# Patient Record
Sex: FEMALE | Race: WHITE | HISPANIC OR LATINO | Employment: UNEMPLOYED | URBAN - METROPOLITAN AREA
[De-identification: names, ages, dates, MRNs, and addresses within clinical notes are randomized per-mention and may not be internally consistent; named-entity substitution may affect disease eponyms.]

---

## 2017-02-01 ENCOUNTER — GENERIC CONVERSION - ENCOUNTER (OUTPATIENT)
Dept: OTHER | Facility: OTHER | Age: 57
End: 2017-02-01

## 2017-02-15 ENCOUNTER — GENERIC CONVERSION - ENCOUNTER (OUTPATIENT)
Dept: OTHER | Facility: OTHER | Age: 57
End: 2017-02-15

## 2017-05-03 ENCOUNTER — APPOINTMENT (OUTPATIENT)
Dept: LAB | Facility: CLINIC | Age: 57
End: 2017-05-03
Payer: COMMERCIAL

## 2017-05-03 ENCOUNTER — ALLSCRIPTS OFFICE VISIT (OUTPATIENT)
Dept: OTHER | Facility: OTHER | Age: 57
End: 2017-05-03

## 2017-05-03 ENCOUNTER — TRANSCRIBE ORDERS (OUTPATIENT)
Dept: ADMINISTRATIVE | Facility: HOSPITAL | Age: 57
End: 2017-05-03

## 2017-05-03 DIAGNOSIS — H53.9 UNSPECIFIED VISUAL DISTURBANCE: Primary | ICD-10-CM

## 2017-05-03 DIAGNOSIS — H53.9 VISUAL DISTURBANCE: ICD-10-CM

## 2017-05-03 DIAGNOSIS — R51 HEADACHE(784.0): ICD-10-CM

## 2017-05-03 DIAGNOSIS — R51.9 FACIAL PAIN: Primary | ICD-10-CM

## 2017-05-03 LAB
ALBUMIN SERPL BCP-MCNC: 4 G/DL (ref 3.5–5)
ALP SERPL-CCNC: 134 U/L (ref 46–116)
ALT SERPL W P-5'-P-CCNC: 24 U/L (ref 12–78)
ANION GAP SERPL CALCULATED.3IONS-SCNC: 8 MMOL/L (ref 4–13)
AST SERPL W P-5'-P-CCNC: 15 U/L (ref 5–45)
BASOPHILS # BLD AUTO: 0.12 THOUSANDS/ΜL (ref 0–0.1)
BASOPHILS NFR BLD AUTO: 1 % (ref 0–1)
BILIRUB SERPL-MCNC: 0.71 MG/DL (ref 0.2–1)
BUN SERPL-MCNC: 16 MG/DL (ref 5–25)
CALCIUM SERPL-MCNC: 10 MG/DL (ref 8.3–10.1)
CHLORIDE SERPL-SCNC: 107 MMOL/L (ref 100–108)
CO2 SERPL-SCNC: 27 MMOL/L (ref 21–32)
CREAT SERPL-MCNC: 0.82 MG/DL (ref 0.6–1.3)
CRP SERPL QL: 5.8 MG/L
EOSINOPHIL # BLD AUTO: 0.16 THOUSAND/ΜL (ref 0–0.61)
EOSINOPHIL NFR BLD AUTO: 1 % (ref 0–6)
ERYTHROCYTE [DISTWIDTH] IN BLOOD BY AUTOMATED COUNT: 15.9 % (ref 11.6–15.1)
ERYTHROCYTE [SEDIMENTATION RATE] IN BLOOD: 44 MM/HOUR (ref 0–20)
GFR SERPL CREATININE-BSD FRML MDRD: >60 ML/MIN/1.73SQ M
GLUCOSE SERPL-MCNC: 74 MG/DL (ref 65–140)
HCT VFR BLD AUTO: 41.8 % (ref 34.8–46.1)
HGB BLD-MCNC: 12.8 G/DL (ref 11.5–15.4)
LYMPHOCYTES # BLD AUTO: 5.23 THOUSANDS/ΜL (ref 0.6–4.47)
LYMPHOCYTES NFR BLD AUTO: 37 % (ref 14–44)
MCH RBC QN AUTO: 25 PG (ref 26.8–34.3)
MCHC RBC AUTO-ENTMCNC: 30.6 G/DL (ref 31.4–37.4)
MCV RBC AUTO: 82 FL (ref 82–98)
MONOCYTES # BLD AUTO: 0.96 THOUSAND/ΜL (ref 0.17–1.22)
MONOCYTES NFR BLD AUTO: 7 % (ref 4–12)
NEUTROPHILS # BLD AUTO: 7.54 THOUSANDS/ΜL (ref 1.85–7.62)
NEUTS SEG NFR BLD AUTO: 54 % (ref 43–75)
NRBC BLD AUTO-RTO: 0 /100 WBCS
PLATELET # BLD AUTO: 310 THOUSANDS/UL (ref 149–390)
PMV BLD AUTO: 13.7 FL (ref 8.9–12.7)
POTASSIUM SERPL-SCNC: 4 MMOL/L (ref 3.5–5.3)
PROT SERPL-MCNC: 8.5 G/DL (ref 6.4–8.2)
RBC # BLD AUTO: 5.13 MILLION/UL (ref 3.81–5.12)
SODIUM SERPL-SCNC: 142 MMOL/L (ref 136–145)
TSH SERPL DL<=0.05 MIU/L-ACNC: 2.34 UIU/ML (ref 0.36–3.74)
WBC # BLD AUTO: 14.05 THOUSAND/UL (ref 4.31–10.16)

## 2017-05-03 PROCEDURE — 86617 LYME DISEASE ANTIBODY: CPT

## 2017-05-03 PROCEDURE — 80053 COMPREHEN METABOLIC PANEL: CPT

## 2017-05-03 PROCEDURE — 84443 ASSAY THYROID STIM HORMONE: CPT

## 2017-05-03 PROCEDURE — 85652 RBC SED RATE AUTOMATED: CPT

## 2017-05-03 PROCEDURE — 85025 COMPLETE CBC W/AUTO DIFF WBC: CPT

## 2017-05-03 PROCEDURE — 86140 C-REACTIVE PROTEIN: CPT

## 2017-05-03 PROCEDURE — 86618 LYME DISEASE ANTIBODY: CPT

## 2017-05-04 ENCOUNTER — GENERIC CONVERSION - ENCOUNTER (OUTPATIENT)
Dept: OTHER | Facility: OTHER | Age: 57
End: 2017-05-04

## 2017-05-04 LAB
B BURGDOR IGG SER IA-ACNC: 0.2
B BURGDOR IGM SER IA-ACNC: 0.89

## 2017-05-05 LAB
B BURGDOR IGG PATRN SER IB-IMP: NEGATIVE
B BURGDOR IGM PATRN SER IB-IMP: NEGATIVE
B BURGDOR18KD IGG SER QL IB: ABNORMAL
B BURGDOR23KD IGG SER QL IB: ABNORMAL
B BURGDOR23KD IGM SER QL IB: PRESENT
B BURGDOR28KD IGG SER QL IB: ABNORMAL
B BURGDOR30KD IGG SER QL IB: ABNORMAL
B BURGDOR39KD IGG SER QL IB: ABNORMAL
B BURGDOR39KD IGM SER QL IB: ABNORMAL
B BURGDOR41KD IGG SER QL IB: ABNORMAL
B BURGDOR41KD IGM SER QL IB: ABNORMAL
B BURGDOR45KD IGG SER QL IB: ABNORMAL
B BURGDOR58KD IGG SER QL IB: ABNORMAL
B BURGDOR66KD IGG SER QL IB: ABNORMAL
B BURGDOR93KD IGG SER QL IB: ABNORMAL

## 2017-05-09 ENCOUNTER — GENERIC CONVERSION - ENCOUNTER (OUTPATIENT)
Dept: OTHER | Facility: OTHER | Age: 57
End: 2017-05-09

## 2017-05-16 ENCOUNTER — HOSPITAL ENCOUNTER (OUTPATIENT)
Dept: ULTRASOUND IMAGING | Facility: CLINIC | Age: 57
Discharge: HOME | End: 2017-05-16
Payer: COMMERCIAL

## 2017-05-16 ENCOUNTER — HOSPITAL ENCOUNTER (OUTPATIENT)
Dept: MRI IMAGING | Facility: CLINIC | Age: 57
Discharge: HOME | End: 2017-05-16
Payer: COMMERCIAL

## 2017-05-16 DIAGNOSIS — H53.9 VISUAL DISTURBANCE: ICD-10-CM

## 2017-05-16 PROCEDURE — 93880 EXTRACRANIAL BILAT STUDY: CPT

## 2017-05-16 PROCEDURE — 70551 MRI BRAIN STEM W/O DYE: CPT

## 2017-07-06 ENCOUNTER — HOSPITAL ENCOUNTER (OUTPATIENT)
Dept: NEUROLOGY | Facility: HOSPITAL | Age: 57
Discharge: HOME | End: 2017-07-06
Attending: PSYCHIATRY & NEUROLOGY
Payer: COMMERCIAL

## 2017-07-06 DIAGNOSIS — H53.9 VISUAL CHANGES: ICD-10-CM

## 2017-07-06 DIAGNOSIS — R51.9 FREQUENT HEADACHES: ICD-10-CM

## 2017-07-06 DIAGNOSIS — R51.9 FACIAL PAIN: ICD-10-CM

## 2017-07-06 PROCEDURE — 95816 EEG AWAKE AND DROWSY: CPT

## 2017-10-26 ENCOUNTER — ALLSCRIPTS OFFICE VISIT (OUTPATIENT)
Dept: OTHER | Facility: OTHER | Age: 57
End: 2017-10-26

## 2017-10-26 DIAGNOSIS — H53.9 VISUAL DISTURBANCE: ICD-10-CM
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
## 2018-01-12 VITALS
BODY MASS INDEX: 36.32 KG/M2 | HEART RATE: 78 BPM | HEIGHT: 63 IN | WEIGHT: 205 LBS | DIASTOLIC BLOOD PRESSURE: 86 MMHG | SYSTOLIC BLOOD PRESSURE: 122 MMHG | RESPIRATION RATE: 16 BRPM

## 2018-01-12 NOTE — MISCELLANEOUS
Dear Alex Pozo,      We have tried to contact you on several occasions  Please call Dr Naomi Delgado at [phone]       Thank you      Electronically signed by:Xi Engel   May  9 2017 10:32AM EST Co-author

## 2018-01-14 VITALS
WEIGHT: 210 LBS | HEIGHT: 63 IN | SYSTOLIC BLOOD PRESSURE: 130 MMHG | HEART RATE: 80 BPM | RESPIRATION RATE: 16 BRPM | BODY MASS INDEX: 37.21 KG/M2 | DIASTOLIC BLOOD PRESSURE: 90 MMHG

## 2021-10-08 ENCOUNTER — APPOINTMENT (EMERGENCY)
Dept: VASCULAR ULTRASOUND | Facility: HOSPITAL | Age: 61
End: 2021-10-08
Payer: COMMERCIAL

## 2021-10-08 ENCOUNTER — HOSPITAL ENCOUNTER (EMERGENCY)
Facility: HOSPITAL | Age: 61
Discharge: HOME | End: 2021-10-08
Attending: EMERGENCY MEDICINE | Admitting: EMERGENCY MEDICINE
Payer: COMMERCIAL

## 2021-10-08 ENCOUNTER — APPOINTMENT (EMERGENCY)
Dept: RADIOLOGY | Facility: HOSPITAL | Age: 61
End: 2021-10-08
Payer: COMMERCIAL

## 2021-10-08 VITALS
TEMPERATURE: 98.3 F | BODY MASS INDEX: 36.86 KG/M2 | DIASTOLIC BLOOD PRESSURE: 65 MMHG | OXYGEN SATURATION: 99 % | SYSTOLIC BLOOD PRESSURE: 127 MMHG | HEIGHT: 63 IN | WEIGHT: 208 LBS | RESPIRATION RATE: 16 BRPM | HEART RATE: 80 BPM

## 2021-10-08 DIAGNOSIS — M25.572 ACUTE LEFT ANKLE PAIN: Primary | ICD-10-CM

## 2021-10-08 DIAGNOSIS — M79.89 PAIN AND SWELLING OF LEFT LOWER LEG: ICD-10-CM

## 2021-10-08 DIAGNOSIS — M79.662 PAIN AND SWELLING OF LEFT LOWER LEG: ICD-10-CM

## 2021-10-08 PROCEDURE — 99284 EMERGENCY DEPT VISIT MOD MDM: CPT

## 2021-10-08 PROCEDURE — 99284 EMERGENCY DEPT VISIT MOD MDM: CPT | Performed by: EMERGENCY MEDICINE

## 2021-10-08 PROCEDURE — 93971 EXTREMITY STUDY: CPT

## 2021-10-08 PROCEDURE — 73610 X-RAY EXAM OF ANKLE: CPT

## 2021-10-08 RX ORDER — IBUPROFEN 600 MG/1
600 TABLET ORAL ONCE
Status: COMPLETED | OUTPATIENT
Start: 2021-10-08 | End: 2021-10-08

## 2021-10-08 RX ADMIN — IBUPROFEN 600 MG: 600 TABLET, FILM COATED ORAL at 11:55

## 2021-10-11 PROCEDURE — 93971 EXTREMITY STUDY: CPT | Performed by: SURGERY

## 2021-10-22 ENCOUNTER — APPOINTMENT (OUTPATIENT)
Dept: RADIOLOGY | Facility: CLINIC | Age: 61
End: 2021-10-22
Payer: COMMERCIAL

## 2021-10-22 ENCOUNTER — OFFICE VISIT (OUTPATIENT)
Dept: FAMILY MEDICINE CLINIC | Facility: CLINIC | Age: 61
End: 2021-10-22
Payer: COMMERCIAL

## 2021-10-22 VITALS
WEIGHT: 216 LBS | DIASTOLIC BLOOD PRESSURE: 88 MMHG | OXYGEN SATURATION: 100 % | BODY MASS INDEX: 38.27 KG/M2 | HEIGHT: 63 IN | SYSTOLIC BLOOD PRESSURE: 138 MMHG | TEMPERATURE: 97.5 F | HEART RATE: 100 BPM

## 2021-10-22 DIAGNOSIS — M25.572 CHRONIC PAIN OF LEFT ANKLE: ICD-10-CM

## 2021-10-22 DIAGNOSIS — R53.83 FATIGUE, UNSPECIFIED TYPE: ICD-10-CM

## 2021-10-22 DIAGNOSIS — G47.00 INSOMNIA, UNSPECIFIED TYPE: ICD-10-CM

## 2021-10-22 DIAGNOSIS — G89.29 CHRONIC PAIN OF LEFT ANKLE: ICD-10-CM

## 2021-10-22 DIAGNOSIS — G89.29 CHRONIC NONINTRACTABLE HEADACHE, UNSPECIFIED HEADACHE TYPE: ICD-10-CM

## 2021-10-22 DIAGNOSIS — M79.641 RIGHT HAND PAIN: ICD-10-CM

## 2021-10-22 DIAGNOSIS — R51.9 CHRONIC NONINTRACTABLE HEADACHE, UNSPECIFIED HEADACHE TYPE: ICD-10-CM

## 2021-10-22 DIAGNOSIS — H93.12 TINNITUS OF LEFT EAR: ICD-10-CM

## 2021-10-22 DIAGNOSIS — Z12.11 COLON CANCER SCREENING: Primary | ICD-10-CM

## 2021-10-22 DIAGNOSIS — Z13.1 SCREENING FOR DIABETES MELLITUS (DM): ICD-10-CM

## 2021-10-22 DIAGNOSIS — Z12.31 VISIT FOR SCREENING MAMMOGRAM: ICD-10-CM

## 2021-10-22 DIAGNOSIS — Z13.220 SCREENING FOR LIPID DISORDERS: ICD-10-CM

## 2021-10-22 DIAGNOSIS — Z12.4 CERVICAL CANCER SCREENING: ICD-10-CM

## 2021-10-22 DIAGNOSIS — Z23 NEEDS FLU SHOT: ICD-10-CM

## 2021-10-22 DIAGNOSIS — Z13.29 SCREENING FOR THYROID DISORDER: ICD-10-CM

## 2021-10-22 PROCEDURE — 90471 IMMUNIZATION ADMIN: CPT | Performed by: FAMILY MEDICINE

## 2021-10-22 PROCEDURE — 90682 RIV4 VACC RECOMBINANT DNA IM: CPT | Performed by: FAMILY MEDICINE

## 2021-10-22 PROCEDURE — 73130 X-RAY EXAM OF HAND: CPT

## 2021-10-22 PROCEDURE — 3008F BODY MASS INDEX DOCD: CPT | Performed by: PHYSICIAN ASSISTANT

## 2021-10-22 PROCEDURE — 3725F SCREEN DEPRESSION PERFORMED: CPT | Performed by: PHYSICIAN ASSISTANT

## 2021-10-22 PROCEDURE — 99203 OFFICE O/P NEW LOW 30 MIN: CPT | Performed by: PHYSICIAN ASSISTANT

## 2021-10-22 RX ORDER — MULTIVIT WITH MINERALS/LUTEIN
1 TABLET ORAL DAILY
COMMUNITY

## 2021-10-22 RX ORDER — SUMATRIPTAN 25 MG/1
25 TABLET, FILM COATED ORAL ONCE AS NEEDED
Qty: 30 TABLET | Refills: 0 | Status: SHIPPED | OUTPATIENT
Start: 2021-10-22 | End: 2022-01-12 | Stop reason: ALTCHOICE

## 2021-10-22 RX ORDER — TRAZODONE HYDROCHLORIDE 50 MG/1
50 TABLET ORAL
Qty: 30 TABLET | Refills: 0 | Status: SHIPPED | OUTPATIENT
Start: 2021-10-22 | End: 2021-11-05

## 2021-10-23 ENCOUNTER — APPOINTMENT (OUTPATIENT)
Dept: LAB | Facility: CLINIC | Age: 61
End: 2021-10-23
Payer: COMMERCIAL

## 2021-10-23 DIAGNOSIS — Z13.29 SCREENING FOR THYROID DISORDER: ICD-10-CM

## 2021-10-23 DIAGNOSIS — R53.83 FATIGUE, UNSPECIFIED TYPE: ICD-10-CM

## 2021-10-23 DIAGNOSIS — Z13.220 SCREENING FOR LIPID DISORDERS: ICD-10-CM

## 2021-10-23 DIAGNOSIS — Z13.1 SCREENING FOR DIABETES MELLITUS (DM): ICD-10-CM

## 2021-10-23 LAB
25(OH)D3 SERPL-MCNC: 22.9 NG/ML (ref 30–100)
ALBUMIN SERPL BCP-MCNC: 3.6 G/DL (ref 3.5–5)
ALP SERPL-CCNC: 117 U/L (ref 46–116)
ALT SERPL W P-5'-P-CCNC: 15 U/L (ref 12–78)
ANION GAP SERPL CALCULATED.3IONS-SCNC: 3 MMOL/L (ref 4–13)
AST SERPL W P-5'-P-CCNC: 13 U/L (ref 5–45)
BASOPHILS # BLD AUTO: 0.11 THOUSANDS/ΜL (ref 0–0.1)
BASOPHILS NFR BLD AUTO: 1 % (ref 0–1)
BILIRUB SERPL-MCNC: 1.12 MG/DL (ref 0.2–1)
BUN SERPL-MCNC: 12 MG/DL (ref 5–25)
CALCIUM SERPL-MCNC: 9.7 MG/DL (ref 8.3–10.1)
CHLORIDE SERPL-SCNC: 109 MMOL/L (ref 100–108)
CHOLEST SERPL-MCNC: 170 MG/DL (ref 50–200)
CO2 SERPL-SCNC: 28 MMOL/L (ref 21–32)
CREAT SERPL-MCNC: 0.87 MG/DL (ref 0.6–1.3)
EOSINOPHIL # BLD AUTO: 0.15 THOUSAND/ΜL (ref 0–0.61)
EOSINOPHIL NFR BLD AUTO: 1 % (ref 0–6)
ERYTHROCYTE [DISTWIDTH] IN BLOOD BY AUTOMATED COUNT: 17.6 % (ref 11.6–15.1)
EST. AVERAGE GLUCOSE BLD GHB EST-MCNC: 128 MG/DL
GFR SERPL CREATININE-BSD FRML MDRD: 73 ML/MIN/1.73SQ M
GLUCOSE P FAST SERPL-MCNC: 92 MG/DL (ref 65–99)
HBA1C MFR BLD: 6.1 %
HCT VFR BLD AUTO: 30.9 % (ref 34.8–46.1)
HDLC SERPL-MCNC: 36 MG/DL
HGB BLD-MCNC: 8.5 G/DL (ref 11.5–15.4)
IMM GRANULOCYTES # BLD AUTO: 0.06 THOUSAND/UL (ref 0–0.2)
IMM GRANULOCYTES NFR BLD AUTO: 0 % (ref 0–2)
LDLC SERPL CALC-MCNC: 99 MG/DL (ref 0–100)
LYMPHOCYTES # BLD AUTO: 5.76 THOUSANDS/ΜL (ref 0.6–4.47)
LYMPHOCYTES NFR BLD AUTO: 42 % (ref 14–44)
MCH RBC QN AUTO: 19.2 PG (ref 26.8–34.3)
MCHC RBC AUTO-ENTMCNC: 27.5 G/DL (ref 31.4–37.4)
MCV RBC AUTO: 70 FL (ref 82–98)
MONOCYTES # BLD AUTO: 0.7 THOUSAND/ΜL (ref 0.17–1.22)
MONOCYTES NFR BLD AUTO: 5 % (ref 4–12)
NEUTROPHILS # BLD AUTO: 6.98 THOUSANDS/ΜL (ref 1.85–7.62)
NEUTS SEG NFR BLD AUTO: 51 % (ref 43–75)
NRBC BLD AUTO-RTO: 0 /100 WBCS
PLATELET # BLD AUTO: 257 THOUSANDS/UL (ref 149–390)
POTASSIUM SERPL-SCNC: 4 MMOL/L (ref 3.5–5.3)
PROT SERPL-MCNC: 7.7 G/DL (ref 6.4–8.2)
RBC # BLD AUTO: 4.42 MILLION/UL (ref 3.81–5.12)
SODIUM SERPL-SCNC: 140 MMOL/L (ref 136–145)
TRIGL SERPL-MCNC: 176 MG/DL
TSH SERPL DL<=0.05 MIU/L-ACNC: 2.76 UIU/ML (ref 0.36–3.74)
WBC # BLD AUTO: 13.76 THOUSAND/UL (ref 4.31–10.16)

## 2021-10-23 PROCEDURE — 83036 HEMOGLOBIN GLYCOSYLATED A1C: CPT

## 2021-10-23 PROCEDURE — 85025 COMPLETE CBC W/AUTO DIFF WBC: CPT

## 2021-10-23 PROCEDURE — 84443 ASSAY THYROID STIM HORMONE: CPT

## 2021-10-23 PROCEDURE — 80053 COMPREHEN METABOLIC PANEL: CPT

## 2021-10-23 PROCEDURE — 80061 LIPID PANEL: CPT

## 2021-10-23 PROCEDURE — 36415 COLL VENOUS BLD VENIPUNCTURE: CPT

## 2021-10-23 PROCEDURE — 82306 VITAMIN D 25 HYDROXY: CPT

## 2021-11-01 ENCOUNTER — APPOINTMENT (OUTPATIENT)
Dept: RADIOLOGY | Facility: CLINIC | Age: 61
End: 2021-11-01
Payer: COMMERCIAL

## 2021-11-01 ENCOUNTER — CONSULT (OUTPATIENT)
Dept: OBGYN CLINIC | Facility: CLINIC | Age: 61
End: 2021-11-01
Payer: COMMERCIAL

## 2021-11-01 VITALS
WEIGHT: 214 LBS | DIASTOLIC BLOOD PRESSURE: 82 MMHG | BODY MASS INDEX: 37.92 KG/M2 | HEIGHT: 63 IN | HEART RATE: 80 BPM | RESPIRATION RATE: 18 BRPM | SYSTOLIC BLOOD PRESSURE: 131 MMHG

## 2021-11-01 DIAGNOSIS — M25.572 CHRONIC PAIN OF LEFT ANKLE: ICD-10-CM

## 2021-11-01 DIAGNOSIS — G89.29 CHRONIC PAIN OF LEFT ANKLE: ICD-10-CM

## 2021-11-01 DIAGNOSIS — R60.0 LEG EDEMA, LEFT: Primary | ICD-10-CM

## 2021-11-01 DIAGNOSIS — M54.42 ACUTE LEFT-SIDED LOW BACK PAIN WITH LEFT-SIDED SCIATICA: ICD-10-CM

## 2021-11-01 DIAGNOSIS — M54.16 LUMBAR RADICULOPATHY: ICD-10-CM

## 2021-11-01 PROCEDURE — 99203 OFFICE O/P NEW LOW 30 MIN: CPT | Performed by: ORTHOPAEDIC SURGERY

## 2021-11-01 PROCEDURE — 72100 X-RAY EXAM L-S SPINE 2/3 VWS: CPT

## 2021-11-05 ENCOUNTER — OFFICE VISIT (OUTPATIENT)
Dept: FAMILY MEDICINE CLINIC | Facility: CLINIC | Age: 61
End: 2021-11-05
Payer: COMMERCIAL

## 2021-11-05 ENCOUNTER — LAB (OUTPATIENT)
Dept: LAB | Facility: CLINIC | Age: 61
End: 2021-11-05
Payer: COMMERCIAL

## 2021-11-05 VITALS
TEMPERATURE: 97.8 F | OXYGEN SATURATION: 96 % | SYSTOLIC BLOOD PRESSURE: 128 MMHG | HEIGHT: 63 IN | BODY MASS INDEX: 37.92 KG/M2 | WEIGHT: 214 LBS | DIASTOLIC BLOOD PRESSURE: 76 MMHG | HEART RATE: 84 BPM

## 2021-11-05 DIAGNOSIS — R73.03 PRE-DIABETES: ICD-10-CM

## 2021-11-05 DIAGNOSIS — Z23 ENCOUNTER FOR IMMUNIZATION: Primary | ICD-10-CM

## 2021-11-05 DIAGNOSIS — E78.1 HYPERTRIGLYCERIDEMIA: ICD-10-CM

## 2021-11-05 DIAGNOSIS — E55.9 VITAMIN D DEFICIENCY: ICD-10-CM

## 2021-11-05 DIAGNOSIS — D64.9 ANEMIA, UNSPECIFIED TYPE: ICD-10-CM

## 2021-11-05 DIAGNOSIS — M62.838 MUSCLE SPASM OF LEFT LOWER EXTREMITY: ICD-10-CM

## 2021-11-05 LAB
25(OH)D3 SERPL-MCNC: 22 NG/ML (ref 30–100)
BASOPHILS # BLD AUTO: 0.16 THOUSANDS/ΜL (ref 0–0.1)
BASOPHILS NFR BLD AUTO: 1 % (ref 0–1)
CHOLEST SERPL-MCNC: 158 MG/DL (ref 50–200)
EOSINOPHIL # BLD AUTO: 0.23 THOUSAND/ΜL (ref 0–0.61)
EOSINOPHIL NFR BLD AUTO: 2 % (ref 0–6)
ERYTHROCYTE [DISTWIDTH] IN BLOOD BY AUTOMATED COUNT: 17.9 % (ref 11.6–15.1)
EST. AVERAGE GLUCOSE BLD GHB EST-MCNC: 134 MG/DL
FERRITIN SERPL-MCNC: 4 NG/ML (ref 8–388)
HBA1C MFR BLD: 6.3 %
HCT VFR BLD AUTO: 31.4 % (ref 34.8–46.1)
HDLC SERPL-MCNC: 36 MG/DL
HGB BLD-MCNC: 8.8 G/DL (ref 11.5–15.4)
IMM GRANULOCYTES # BLD AUTO: 0.04 THOUSAND/UL (ref 0–0.2)
IMM GRANULOCYTES NFR BLD AUTO: 0 % (ref 0–2)
IRON SATN MFR SERPL: 5 % (ref 15–50)
IRON SERPL-MCNC: 26 UG/DL (ref 50–170)
LDLC SERPL CALC-MCNC: 89 MG/DL (ref 0–100)
LYMPHOCYTES # BLD AUTO: 5.77 THOUSANDS/ΜL (ref 0.6–4.47)
LYMPHOCYTES NFR BLD AUTO: 39 % (ref 14–44)
MCH RBC QN AUTO: 19.2 PG (ref 26.8–34.3)
MCHC RBC AUTO-ENTMCNC: 28 G/DL (ref 31.4–37.4)
MCV RBC AUTO: 69 FL (ref 82–98)
MONOCYTES # BLD AUTO: 0.91 THOUSAND/ΜL (ref 0.17–1.22)
MONOCYTES NFR BLD AUTO: 6 % (ref 4–12)
NEUTROPHILS # BLD AUTO: 7.55 THOUSANDS/ΜL (ref 1.85–7.62)
NEUTS SEG NFR BLD AUTO: 52 % (ref 43–75)
NONHDLC SERPL-MCNC: 122 MG/DL
NRBC BLD AUTO-RTO: 0 /100 WBCS
PLATELET # BLD AUTO: 291 THOUSANDS/UL (ref 149–390)
RBC # BLD AUTO: 4.58 MILLION/UL (ref 3.81–5.12)
TIBC SERPL-MCNC: 502 UG/DL (ref 250–450)
TRIGL SERPL-MCNC: 165 MG/DL
WBC # BLD AUTO: 14.66 THOUSAND/UL (ref 4.31–10.16)

## 2021-11-05 PROCEDURE — 82728 ASSAY OF FERRITIN: CPT

## 2021-11-05 PROCEDURE — 83540 ASSAY OF IRON: CPT

## 2021-11-05 PROCEDURE — 99214 OFFICE O/P EST MOD 30 MIN: CPT | Performed by: PHYSICIAN ASSISTANT

## 2021-11-05 PROCEDURE — 36415 COLL VENOUS BLD VENIPUNCTURE: CPT

## 2021-11-05 PROCEDURE — 83550 IRON BINDING TEST: CPT

## 2021-11-05 PROCEDURE — 83036 HEMOGLOBIN GLYCOSYLATED A1C: CPT

## 2021-11-05 PROCEDURE — 80061 LIPID PANEL: CPT

## 2021-11-05 PROCEDURE — 90732 PPSV23 VACC 2 YRS+ SUBQ/IM: CPT

## 2021-11-05 PROCEDURE — 82306 VITAMIN D 25 HYDROXY: CPT

## 2021-11-05 PROCEDURE — 85025 COMPLETE CBC W/AUTO DIFF WBC: CPT

## 2021-11-05 PROCEDURE — 90471 IMMUNIZATION ADMIN: CPT

## 2021-11-05 RX ORDER — ERGOCALCIFEROL 1.25 MG/1
50000 CAPSULE ORAL WEEKLY
Qty: 12 CAPSULE | Refills: 0 | Status: SHIPPED | OUTPATIENT
Start: 2021-11-05

## 2021-11-05 RX ORDER — CYCLOBENZAPRINE HCL 10 MG
10 TABLET ORAL
Qty: 30 TABLET | Refills: 0 | Status: SHIPPED | OUTPATIENT
Start: 2021-11-05 | End: 2022-01-12 | Stop reason: ALTCHOICE

## 2021-11-08 ENCOUNTER — TELEPHONE (OUTPATIENT)
Dept: HEMATOLOGY ONCOLOGY | Facility: CLINIC | Age: 61
End: 2021-11-08

## 2021-11-16 ENCOUNTER — EVALUATION (OUTPATIENT)
Dept: OCCUPATIONAL THERAPY | Facility: CLINIC | Age: 61
End: 2021-11-16
Payer: COMMERCIAL

## 2021-11-16 ENCOUNTER — EVALUATION (OUTPATIENT)
Dept: PHYSICAL THERAPY | Facility: CLINIC | Age: 61
End: 2021-11-16
Payer: COMMERCIAL

## 2021-11-16 DIAGNOSIS — M54.16 LUMBAR RADICULOPATHY: Primary | ICD-10-CM

## 2021-11-16 DIAGNOSIS — R60.0 LEG EDEMA, LEFT: Primary | ICD-10-CM

## 2021-11-16 DIAGNOSIS — G89.29 CHRONIC PAIN OF LEFT ANKLE: ICD-10-CM

## 2021-11-16 DIAGNOSIS — M25.572 CHRONIC PAIN OF LEFT ANKLE: ICD-10-CM

## 2021-11-16 PROCEDURE — 97165 OT EVAL LOW COMPLEX 30 MIN: CPT

## 2021-11-16 PROCEDURE — 97140 MANUAL THERAPY 1/> REGIONS: CPT

## 2021-11-16 PROCEDURE — 97163 PT EVAL HIGH COMPLEX 45 MIN: CPT | Performed by: PHYSICAL THERAPIST

## 2021-11-18 ENCOUNTER — OFFICE VISIT (OUTPATIENT)
Dept: PHYSICAL THERAPY | Facility: CLINIC | Age: 61
End: 2021-11-18
Payer: COMMERCIAL

## 2021-11-18 ENCOUNTER — OFFICE VISIT (OUTPATIENT)
Dept: OCCUPATIONAL THERAPY | Facility: CLINIC | Age: 61
End: 2021-11-18
Payer: COMMERCIAL

## 2021-11-18 DIAGNOSIS — R60.0 LEG EDEMA, LEFT: ICD-10-CM

## 2021-11-18 DIAGNOSIS — M25.572 CHRONIC PAIN OF LEFT ANKLE: Primary | ICD-10-CM

## 2021-11-18 DIAGNOSIS — M54.16 LUMBAR RADICULOPATHY: Primary | ICD-10-CM

## 2021-11-18 DIAGNOSIS — G89.29 CHRONIC PAIN OF LEFT ANKLE: Primary | ICD-10-CM

## 2021-11-18 PROCEDURE — 97016 VASOPNEUMATIC DEVICE THERAPY: CPT

## 2021-11-18 PROCEDURE — 97110 THERAPEUTIC EXERCISES: CPT | Performed by: PHYSICAL THERAPIST

## 2021-11-18 PROCEDURE — 97112 NEUROMUSCULAR REEDUCATION: CPT | Performed by: PHYSICAL THERAPIST

## 2021-11-18 PROCEDURE — 97140 MANUAL THERAPY 1/> REGIONS: CPT

## 2021-11-23 ENCOUNTER — OFFICE VISIT (OUTPATIENT)
Dept: OCCUPATIONAL THERAPY | Facility: CLINIC | Age: 61
End: 2021-11-23
Payer: COMMERCIAL

## 2021-11-23 ENCOUNTER — OFFICE VISIT (OUTPATIENT)
Dept: PHYSICAL THERAPY | Facility: CLINIC | Age: 61
End: 2021-11-23
Payer: COMMERCIAL

## 2021-11-23 DIAGNOSIS — R60.0 LEG EDEMA, LEFT: ICD-10-CM

## 2021-11-23 DIAGNOSIS — G89.29 CHRONIC PAIN OF LEFT ANKLE: Primary | ICD-10-CM

## 2021-11-23 DIAGNOSIS — M25.572 CHRONIC PAIN OF LEFT ANKLE: Primary | ICD-10-CM

## 2021-11-23 DIAGNOSIS — M54.16 LUMBAR RADICULOPATHY: Primary | ICD-10-CM

## 2021-11-23 PROCEDURE — 97110 THERAPEUTIC EXERCISES: CPT

## 2021-11-23 PROCEDURE — 97112 NEUROMUSCULAR REEDUCATION: CPT

## 2021-11-23 PROCEDURE — 97140 MANUAL THERAPY 1/> REGIONS: CPT

## 2021-11-23 PROCEDURE — 97016 VASOPNEUMATIC DEVICE THERAPY: CPT

## 2021-11-23 PROCEDURE — 97530 THERAPEUTIC ACTIVITIES: CPT

## 2021-11-26 ENCOUNTER — APPOINTMENT (OUTPATIENT)
Dept: PHYSICAL THERAPY | Facility: CLINIC | Age: 61
End: 2021-11-26
Payer: COMMERCIAL

## 2021-11-29 ENCOUNTER — OFFICE VISIT (OUTPATIENT)
Dept: OCCUPATIONAL THERAPY | Facility: CLINIC | Age: 61
End: 2021-11-29
Payer: COMMERCIAL

## 2021-11-29 ENCOUNTER — OFFICE VISIT (OUTPATIENT)
Dept: PHYSICAL THERAPY | Facility: CLINIC | Age: 61
End: 2021-11-29
Payer: COMMERCIAL

## 2021-11-29 DIAGNOSIS — R60.0 LEG EDEMA, LEFT: Primary | ICD-10-CM

## 2021-11-29 DIAGNOSIS — M25.572 CHRONIC PAIN OF LEFT ANKLE: ICD-10-CM

## 2021-11-29 DIAGNOSIS — M54.16 LUMBAR RADICULOPATHY: Primary | ICD-10-CM

## 2021-11-29 DIAGNOSIS — G89.29 CHRONIC PAIN OF LEFT ANKLE: ICD-10-CM

## 2021-11-29 PROCEDURE — 97112 NEUROMUSCULAR REEDUCATION: CPT

## 2021-11-29 PROCEDURE — 97140 MANUAL THERAPY 1/> REGIONS: CPT

## 2021-11-29 PROCEDURE — 97530 THERAPEUTIC ACTIVITIES: CPT

## 2021-11-29 PROCEDURE — 97110 THERAPEUTIC EXERCISES: CPT

## 2021-11-29 PROCEDURE — 97016 VASOPNEUMATIC DEVICE THERAPY: CPT

## 2021-11-30 ENCOUNTER — CONSULT (OUTPATIENT)
Dept: HEMATOLOGY ONCOLOGY | Facility: CLINIC | Age: 61
End: 2021-11-30
Payer: COMMERCIAL

## 2021-11-30 ENCOUNTER — APPOINTMENT (OUTPATIENT)
Dept: LAB | Facility: HOSPITAL | Age: 61
End: 2021-11-30
Payer: COMMERCIAL

## 2021-11-30 VITALS
WEIGHT: 209 LBS | BODY MASS INDEX: 37.03 KG/M2 | HEIGHT: 63 IN | RESPIRATION RATE: 16 BRPM | TEMPERATURE: 98.5 F | HEART RATE: 97 BPM | DIASTOLIC BLOOD PRESSURE: 90 MMHG | SYSTOLIC BLOOD PRESSURE: 146 MMHG

## 2021-11-30 DIAGNOSIS — D50.9 IRON DEFICIENCY ANEMIA, UNSPECIFIED IRON DEFICIENCY ANEMIA TYPE: Primary | ICD-10-CM

## 2021-11-30 DIAGNOSIS — D50.9 IRON DEFICIENCY ANEMIA, UNSPECIFIED IRON DEFICIENCY ANEMIA TYPE: ICD-10-CM

## 2021-11-30 DIAGNOSIS — D72.829 LEUKOCYTOSIS, UNSPECIFIED TYPE: ICD-10-CM

## 2021-11-30 DIAGNOSIS — D64.9 ANEMIA, UNSPECIFIED TYPE: ICD-10-CM

## 2021-11-30 LAB
ERYTHROCYTE [DISTWIDTH] IN BLOOD BY AUTOMATED COUNT: 18.5 % (ref 11.6–15.1)
FERRITIN SERPL-MCNC: 9 NG/ML (ref 8–388)
FOLATE SERPL-MCNC: 15.7 NG/ML (ref 3.1–17.5)
HCT VFR BLD AUTO: 32.4 % (ref 34.8–46.1)
HGB BLD-MCNC: 8.9 G/DL (ref 11.5–15.4)
IRON SATN MFR SERPL: 5 % (ref 15–50)
IRON SERPL-MCNC: 20 UG/DL (ref 50–170)
MCH RBC QN AUTO: 18.8 PG (ref 26.8–34.3)
MCHC RBC AUTO-ENTMCNC: 27.5 G/DL (ref 31.4–37.4)
MCV RBC AUTO: 69 FL (ref 82–98)
PLATELET # BLD AUTO: 259 THOUSANDS/UL (ref 149–390)
RBC # BLD AUTO: 4.73 MILLION/UL (ref 3.81–5.12)
TIBC SERPL-MCNC: 442 UG/DL (ref 250–450)
VIT B12 SERPL-MCNC: 1139 PG/ML (ref 100–900)
WBC # BLD AUTO: 10.57 THOUSAND/UL (ref 4.31–10.16)

## 2021-11-30 PROCEDURE — 88184 FLOWCYTOMETRY/ TC 1 MARKER: CPT

## 2021-11-30 PROCEDURE — 82746 ASSAY OF FOLIC ACID SERUM: CPT

## 2021-11-30 PROCEDURE — 36415 COLL VENOUS BLD VENIPUNCTURE: CPT

## 2021-11-30 PROCEDURE — 85027 COMPLETE CBC AUTOMATED: CPT

## 2021-11-30 PROCEDURE — 83540 ASSAY OF IRON: CPT

## 2021-11-30 PROCEDURE — 83550 IRON BINDING TEST: CPT

## 2021-11-30 PROCEDURE — 82728 ASSAY OF FERRITIN: CPT

## 2021-11-30 PROCEDURE — 3008F BODY MASS INDEX DOCD: CPT | Performed by: PHYSICIAN ASSISTANT

## 2021-11-30 PROCEDURE — 88185 FLOWCYTOMETRY/TC ADD-ON: CPT

## 2021-11-30 PROCEDURE — 82607 VITAMIN B-12: CPT

## 2021-11-30 PROCEDURE — 99244 OFF/OP CNSLTJ NEW/EST MOD 40: CPT | Performed by: STUDENT IN AN ORGANIZED HEALTH CARE EDUCATION/TRAINING PROGRAM

## 2021-11-30 RX ORDER — SODIUM CHLORIDE 9 MG/ML
20 INJECTION, SOLUTION INTRAVENOUS ONCE
Status: CANCELLED | OUTPATIENT
Start: 2021-12-01

## 2021-12-01 DIAGNOSIS — D50.9 IRON DEFICIENCY ANEMIA, UNSPECIFIED IRON DEFICIENCY ANEMIA TYPE: Primary | ICD-10-CM

## 2021-12-01 LAB — SCAN RESULT: NORMAL

## 2021-12-01 RX ORDER — SODIUM CHLORIDE 9 MG/ML
20 INJECTION, SOLUTION INTRAVENOUS ONCE
Status: CANCELLED | OUTPATIENT
Start: 2021-12-03

## 2021-12-02 ENCOUNTER — OFFICE VISIT (OUTPATIENT)
Dept: PHYSICAL THERAPY | Facility: CLINIC | Age: 61
End: 2021-12-02
Payer: COMMERCIAL

## 2021-12-02 ENCOUNTER — OFFICE VISIT (OUTPATIENT)
Dept: OCCUPATIONAL THERAPY | Facility: CLINIC | Age: 61
End: 2021-12-02
Payer: COMMERCIAL

## 2021-12-02 DIAGNOSIS — R60.0 LEG EDEMA, LEFT: Primary | ICD-10-CM

## 2021-12-02 DIAGNOSIS — G89.29 CHRONIC PAIN OF LEFT ANKLE: ICD-10-CM

## 2021-12-02 DIAGNOSIS — M25.572 CHRONIC PAIN OF LEFT ANKLE: ICD-10-CM

## 2021-12-02 DIAGNOSIS — M54.16 LUMBAR RADICULOPATHY: Primary | ICD-10-CM

## 2021-12-02 PROCEDURE — 97112 NEUROMUSCULAR REEDUCATION: CPT

## 2021-12-02 PROCEDURE — 97530 THERAPEUTIC ACTIVITIES: CPT

## 2021-12-02 PROCEDURE — 97140 MANUAL THERAPY 1/> REGIONS: CPT

## 2021-12-02 PROCEDURE — 97016 VASOPNEUMATIC DEVICE THERAPY: CPT

## 2021-12-02 PROCEDURE — 97110 THERAPEUTIC EXERCISES: CPT

## 2021-12-03 ENCOUNTER — HOSPITAL ENCOUNTER (OUTPATIENT)
Dept: INFUSION CENTER | Facility: CLINIC | Age: 61
Discharge: HOME | End: 2021-12-03
Payer: COMMERCIAL

## 2021-12-03 VITALS
DIASTOLIC BLOOD PRESSURE: 85 MMHG | RESPIRATION RATE: 18 BRPM | HEART RATE: 105 BPM | TEMPERATURE: 97.3 F | SYSTOLIC BLOOD PRESSURE: 138 MMHG | OXYGEN SATURATION: 97 %

## 2021-12-03 DIAGNOSIS — D50.9 IRON DEFICIENCY ANEMIA, UNSPECIFIED IRON DEFICIENCY ANEMIA TYPE: Primary | ICD-10-CM

## 2021-12-03 PROCEDURE — 96365 THER/PROPH/DIAG IV INF INIT: CPT

## 2021-12-03 RX ORDER — SODIUM CHLORIDE 9 MG/ML
20 INJECTION, SOLUTION INTRAVENOUS ONCE
Status: CANCELLED | OUTPATIENT
Start: 2021-12-10

## 2021-12-03 RX ORDER — SODIUM CHLORIDE 9 MG/ML
20 INJECTION, SOLUTION INTRAVENOUS ONCE
Status: COMPLETED | OUTPATIENT
Start: 2021-12-03 | End: 2021-12-03

## 2021-12-03 RX ADMIN — SODIUM CHLORIDE 20 ML/HR: 0.9 INJECTION, SOLUTION INTRAVENOUS at 13:02

## 2021-12-03 RX ADMIN — IRON SUCROSE 300 MG: 20 INJECTION, SOLUTION INTRAVENOUS at 13:02

## 2021-12-07 ENCOUNTER — OFFICE VISIT (OUTPATIENT)
Dept: PHYSICAL THERAPY | Facility: CLINIC | Age: 61
End: 2021-12-07
Payer: COMMERCIAL

## 2021-12-07 ENCOUNTER — OFFICE VISIT (OUTPATIENT)
Dept: OCCUPATIONAL THERAPY | Facility: CLINIC | Age: 61
End: 2021-12-07
Payer: COMMERCIAL

## 2021-12-07 DIAGNOSIS — G89.29 CHRONIC PAIN OF LEFT ANKLE: Primary | ICD-10-CM

## 2021-12-07 DIAGNOSIS — M54.16 LUMBAR RADICULOPATHY: Primary | ICD-10-CM

## 2021-12-07 DIAGNOSIS — M25.572 CHRONIC PAIN OF LEFT ANKLE: Primary | ICD-10-CM

## 2021-12-07 PROCEDURE — 97110 THERAPEUTIC EXERCISES: CPT

## 2021-12-07 PROCEDURE — 97140 MANUAL THERAPY 1/> REGIONS: CPT

## 2021-12-07 PROCEDURE — 97112 NEUROMUSCULAR REEDUCATION: CPT

## 2021-12-07 PROCEDURE — 97016 VASOPNEUMATIC DEVICE THERAPY: CPT

## 2021-12-09 ENCOUNTER — APPOINTMENT (OUTPATIENT)
Dept: OCCUPATIONAL THERAPY | Facility: CLINIC | Age: 61
End: 2021-12-09
Payer: COMMERCIAL

## 2021-12-09 ENCOUNTER — APPOINTMENT (OUTPATIENT)
Dept: PHYSICAL THERAPY | Facility: CLINIC | Age: 61
End: 2021-12-09
Payer: COMMERCIAL

## 2021-12-09 NOTE — PROGRESS NOTES
Daily Note     Today's date: 2021  Patient name: Lawson Polk  : 1960  MRN: [id number]  Referring provider: Kiera Chase DO  Dx:   Encounter Diagnosis     ICD-10-CM    1  Lumbar radiculopathy  M54 16                   Subjective: ***      Objective: See treatment diary below      Assessment: Tolerated treatment well  Patient demonstrated fatigue post treatment and would benefit from continued PT  Plan: Continue per plan of care  Progress treatment as tolerated  Precautions:  GERD; low back pain      Precautions:  GERD; low back pain      Precautions: chronic pain      Manuals                                                           Neuro Re-Ed             TA iso  10x10" 5" 2x 10  5" 2x 10  5" 2x 10         BFKO  20x 20x ea  20x limited range  20x 2x 10        Dead bug              Seated multifidus rotation  GTB 10x5" ea  NV  GTB 2x10,5" ea  TA marching  10 x 10 x ea 2x 10        Multifidus antirotation standing       GTB  5x ea  Ther Ex             Nustep: UE/LE  L5 10 min L5 10 min  L3 10 min  L6 10 min L6 10 min        Webslide: L row  GTB 2x10 3" GTB and M  L and M GTB 2x 10  L and M GTB 2x 10  L and M GTB 2x 10        Clamshells   NV  2x10 2x 10        Bridging   NV 2x 10  2x10 2x 10        Piriformis str     30" x 3  30" x 3  30" x 3 30" x 3                                               Ther Activity             Total gym: squats  L22 2x10 L22 2x 10  L22 3x 10  Squats at bar 2x15   NV,L22 2x10                    Gait Training                                       Modalities

## 2021-12-10 ENCOUNTER — HOSPITAL ENCOUNTER (OUTPATIENT)
Dept: INFUSION CENTER | Facility: CLINIC | Age: 61
Discharge: HOME | End: 2021-12-10
Payer: COMMERCIAL

## 2021-12-10 VITALS
RESPIRATION RATE: 18 BRPM | HEART RATE: 84 BPM | TEMPERATURE: 96.7 F | SYSTOLIC BLOOD PRESSURE: 145 MMHG | DIASTOLIC BLOOD PRESSURE: 72 MMHG

## 2021-12-10 DIAGNOSIS — D50.9 IRON DEFICIENCY ANEMIA, UNSPECIFIED IRON DEFICIENCY ANEMIA TYPE: Primary | ICD-10-CM

## 2021-12-10 PROCEDURE — 96365 THER/PROPH/DIAG IV INF INIT: CPT

## 2021-12-10 RX ORDER — SODIUM CHLORIDE 9 MG/ML
20 INJECTION, SOLUTION INTRAVENOUS ONCE
Status: COMPLETED | OUTPATIENT
Start: 2021-12-10 | End: 2021-12-10

## 2021-12-10 RX ORDER — SODIUM CHLORIDE 9 MG/ML
20 INJECTION, SOLUTION INTRAVENOUS ONCE
Status: CANCELLED | OUTPATIENT
Start: 2021-12-17

## 2021-12-10 RX ADMIN — IRON SUCROSE 300 MG: 20 INJECTION, SOLUTION INTRAVENOUS at 12:50

## 2021-12-10 RX ADMIN — SODIUM CHLORIDE 20 ML/HR: 9 INJECTION, SOLUTION INTRAVENOUS at 12:44

## 2021-12-10 NOTE — H&P (VIEW-ONLY)
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[place] [address]      Initial Visit  Nita Caceres is a 63 y o  here for new consultation with me today  The patient is referred by Juanpablo Bean and the reason for consultation is anemia  Her CBC is remarkable for leukocytosis and microcytic anemia with stable platelet count:   0/1/2983  10/23/2021  11/5/2021  11/30/2021    WBC 14 05 (H) 13 76 (H) 14 66 (H) 10 57 (H)   Hemoglobin 12 8 8 5 (L) 8 8 (L) 8 9 (L)   HCT 41 8 30 9 (L) 31 4 (L) 32 4 (L)   MCV 82 70 (L) 69 (L) 69 (L)   Platelet Count 515 451 291 259     Her differential is remarkable for increased lymphocytes and basophils:   5/3/2017  10/23/2021  11/5/2021    Immature Grans Absolute  0 06 0 04   Absolute Neutrophils 7 54 6 98 7 55   Lymphocytes Absolute 5 23 (H) 5 76 (H) 5 77 (H)   Absolute Monocytes 0 96 0 70 0 91   Absolute Eosinophils 0 16 0 15 0 23   Basophils Absolute 0 12 (H) 0 11 (H) 0 16 (H)     Her iron panel shows significant iron deficiency:   11/5/2021 11/30/2021    Iron 26 (L) 20 (L)   Ferritin 4 (L) 9   Iron Saturation 5 (L) 5 (L)   TIBC 502 (H) 442      11/30/2021    Folate 15 7   Vitamin B-12 1,139 (H)       During her initial visit, she said that she has been anemic all her life, but it has never been investigated before  She had a hysterectomy for fibroids and secondary menorrhagia 8-9 years ago  She denied any abnormal vaginal bleeding since then    She is due for a colonoscopy and will schedule one after the visit  She denied risk factors for HIV or hepatitis  This Visit  Her 11/30 leukemia/lymphoma flow cytometry showed findings consistent with monoclonal B-cell lymphocytosis (MBL) of nonspecific (non CLL) phenotype (16%, 2 1 k/uL)  " Also, it did "not show significant numbers of circulating blasts or an abnormal T lymphoid or myeloid population    she is here by herself today  she voices no acute complaints  She has received 2 of 5 doses of Venofer thus far  She tells me that she experienced nausea and 1 episode of vomiting after the first infusion, and fatigue for 3 days after the second infusion  She notes improvement of fatigue and lightheadedness  She continues to note decreased stamina, insomnia, RLS, and SAHNI with ascending stairs  She notes resolution of her headaches  she denies fevers, chills, unintentional weight loss, night sweats, dizziness, cough, SOB at rest, palpitations, chest pain, abdominal tenderness/distension, nausea/vomiting, constipation/diarrhea, melena/hematochezia, hematuria, petechiae/purpura, increased ecchymosis, skin pallor, pagophagia, or LE swelling  I have reviewed the relevant past medical, surgical, social and family history  I have also reviewed allergies and medications for this patient  Review of Systems  Review of Systems   All other systems reviewed and are negative  OBJECTIVE     Physical Exam  Vitals:    12/14/21 0804   BP: 130/82   BP Location: Left arm   Patient Position: Sitting   Pulse: 68   Resp: 14   Temp: (!) 96 8 °F (36 °C)   TempSrc: Tympanic   Weight: 95 7 kg (211 lb)   Height: 5' 3" (1 6 m)       Physical Exam  Vitals reviewed  Constitutional:       General: She is not in acute distress  Appearance: Normal appearance  She is obese  She is not ill-appearing, toxic-appearing or diaphoretic  Comments: Pleasant 63 y o  female sitting comfortably on an exam room chair     HENT:      Head: Normocephalic and atraumatic  Eyes:      General: No scleral icterus  Extraocular Movements: Extraocular movements intact  Conjunctiva/sclera: Conjunctivae normal       Pupils: Pupils are equal, round, and reactive to light  Comments: No conjunctival pallor  Cardiovascular:      Rate and Rhythm: Normal rate and regular rhythm  Pulses: Normal pulses  Heart sounds: Normal heart sounds  No murmur heard  No friction rub  No gallop  Pulmonary:      Effort: Pulmonary effort is normal  No respiratory distress  Breath sounds: Normal breath sounds  No wheezing or rales  Abdominal:      General: Bowel sounds are normal  There is no distension  Palpations: Abdomen is soft  There is no mass  Tenderness: There is no abdominal tenderness  There is no guarding or rebound  Musculoskeletal:         General: No swelling or tenderness  Normal range of motion  Cervical back: Normal range of motion and neck supple  No rigidity  Right lower leg: No edema  Left lower leg: No edema  Lymphadenopathy:      Cervical: No cervical adenopathy  Skin:     General: Skin is warm and dry  Coloration: Skin is not jaundiced or pale  Findings: No bruising, erythema or rash  Neurological:      General: No focal deficit present  Mental Status: She is alert  Mental status is at baseline  Psychiatric:         Mood and Affect: Mood normal          Behavior: Behavior normal           Imaging  Relevant imaging reviewed in chart    Labs  Relevant labs reviewed in chart     ASSESSMENT & PLAN      Diagnosis ICD-10-CM Associated Orders   1  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  D72 820 Ambulatory referral to Interventional Radiology   2  Leukocytosis, unspecified type  D72 829 Ambulatory referral to Interventional Radiology   3   Iron deficiency anemia, unspecified iron deficiency anemia type  D50 9        I discussed Julio Whelan Humberto's case with Dr Merlyn Gibson and we formulated the plan together  63 y o  Female seen in follow-up for iron deficiency anemia and leukocytosis  Flow cytometry showing monoclonal B-cell lymphocytosis with nonspecific phenotype  Discussion  I extensively reviewed her blood work with her, which demonstrated iron deficiency and leukocytosis  In regards to her leukocytosis, it appears chronic in nature with increase in basophils and lymphocytes  Upon review of her medications, it is unlikely that this is drug-induced  She denies any risk factors for HIV or hepatitis and is not symptomatic for any chronic infections  The differential at this time includes chronic primary bone marrow pathology  Given that her leukemia/lymphoma flow cytometry showed nonspecific monoclonal B-cell lymphocytosis, I recommend comprehensive examination with bone marrow biopsy  I extensively reviewed the reasoning, process, and side effect profile of obtaining a bone marrow sample with the patient and answered all her questions  she understands that complications include but are not limited to hemorrhage, infection, needle breakage, and pain or discomfort site of the procedure  All questions were answered to their satisfaction  Given Julio Paul's iron deficiency anemia, I recommended completion of IV iron supplementation with IV Venofer  The underlying etiology of Julio Paul's iron deficiency anemia is still uknown  However, I defer to her PCP to investigate the underlying cause and coordinate the appropriate management  I explained that IV iron supplementation will only temporarily alleviate her iron deficiency anemia unless the underlying etiology is managed  Thus, I strongly encouraged her to follow up with her PCP and obtain her colonoscopy as soon as possible  Plan/Labs:  · I placed a referral to Interventional Radiology and filled out the [facility] form      Continue remaining 3/5 IV iron sucrose (Venofer) 300mg weekly  I have ordered a CBC and platelet and iron panel in 3 months to confirm adequate iron supplementation and resolution of anemia  Follow-up  · 2 weeks after scheduled bone marrow biopsy  3 months to discuss iron deficiency anemia - already scheduled    All questions were answered to the patient's satisfaction during this encounter  They appreciated and thanked me for spending time with them  The patient knows the contact information for our office and know to reach out for any relevant concerns related to this encounter  For all other listed problems and medical diagnosis in his chart - they are managed by PCP and/or other specialists, which patient acknowledges        Sara Milan, PA-C  Hematology & Medical Oncology

## 2021-12-13 ENCOUNTER — TELEPHONE (OUTPATIENT)
Dept: FAMILY MEDICINE CLINIC | Facility: CLINIC | Age: 61
End: 2021-12-13

## 2021-12-14 ENCOUNTER — OFFICE VISIT (OUTPATIENT)
Dept: HEMATOLOGY ONCOLOGY | Facility: CLINIC | Age: 61
End: 2021-12-14
Payer: COMMERCIAL

## 2021-12-14 ENCOUNTER — OFFICE VISIT (OUTPATIENT)
Dept: PHYSICAL THERAPY | Facility: CLINIC | Age: 61
End: 2021-12-14
Payer: COMMERCIAL

## 2021-12-14 ENCOUNTER — OFFICE VISIT (OUTPATIENT)
Dept: OCCUPATIONAL THERAPY | Facility: CLINIC | Age: 61
End: 2021-12-14
Payer: COMMERCIAL

## 2021-12-14 VITALS
TEMPERATURE: 96.8 F | WEIGHT: 211 LBS | BODY MASS INDEX: 37.39 KG/M2 | HEIGHT: 63 IN | RESPIRATION RATE: 14 BRPM | SYSTOLIC BLOOD PRESSURE: 130 MMHG | HEART RATE: 68 BPM | DIASTOLIC BLOOD PRESSURE: 82 MMHG

## 2021-12-14 DIAGNOSIS — D72.829 LEUKOCYTOSIS, UNSPECIFIED TYPE: ICD-10-CM

## 2021-12-14 DIAGNOSIS — M54.16 LUMBAR RADICULOPATHY: Primary | ICD-10-CM

## 2021-12-14 DIAGNOSIS — R60.0 LEG EDEMA, LEFT: ICD-10-CM

## 2021-12-14 DIAGNOSIS — D50.9 IRON DEFICIENCY ANEMIA, UNSPECIFIED IRON DEFICIENCY ANEMIA TYPE: ICD-10-CM

## 2021-12-14 DIAGNOSIS — M25.572 CHRONIC PAIN OF LEFT ANKLE: Primary | ICD-10-CM

## 2021-12-14 DIAGNOSIS — D72.820 MONOCLONAL B-CELL LYMPHOCYTOSIS WITH IMMUNOPHENOTYPE NOT LIKE CHRONIC LYMPHOCYTIC LEUKEMIA: Primary | ICD-10-CM

## 2021-12-14 DIAGNOSIS — G89.29 CHRONIC PAIN OF LEFT ANKLE: Primary | ICD-10-CM

## 2021-12-14 PROCEDURE — 97112 NEUROMUSCULAR REEDUCATION: CPT | Performed by: PHYSICAL THERAPIST

## 2021-12-14 PROCEDURE — 99214 OFFICE O/P EST MOD 30 MIN: CPT | Performed by: STUDENT IN AN ORGANIZED HEALTH CARE EDUCATION/TRAINING PROGRAM

## 2021-12-14 PROCEDURE — 97140 MANUAL THERAPY 1/> REGIONS: CPT

## 2021-12-14 PROCEDURE — 97110 THERAPEUTIC EXERCISES: CPT | Performed by: PHYSICAL THERAPIST

## 2021-12-14 PROCEDURE — 97016 VASOPNEUMATIC DEVICE THERAPY: CPT

## 2021-12-16 ENCOUNTER — EVALUATION (OUTPATIENT)
Dept: OCCUPATIONAL THERAPY | Facility: CLINIC | Age: 61
End: 2021-12-16
Payer: COMMERCIAL

## 2021-12-16 ENCOUNTER — OFFICE VISIT (OUTPATIENT)
Dept: PHYSICAL THERAPY | Facility: CLINIC | Age: 61
End: 2021-12-16
Payer: COMMERCIAL

## 2021-12-16 DIAGNOSIS — M54.16 LUMBAR RADICULOPATHY: Primary | ICD-10-CM

## 2021-12-16 DIAGNOSIS — M25.572 CHRONIC PAIN OF LEFT ANKLE: Primary | ICD-10-CM

## 2021-12-16 DIAGNOSIS — G89.29 CHRONIC PAIN OF LEFT ANKLE: Primary | ICD-10-CM

## 2021-12-16 DIAGNOSIS — R60.0 LEG EDEMA, LEFT: ICD-10-CM

## 2021-12-16 PROCEDURE — 97016 VASOPNEUMATIC DEVICE THERAPY: CPT

## 2021-12-16 PROCEDURE — 97112 NEUROMUSCULAR REEDUCATION: CPT

## 2021-12-16 PROCEDURE — 97110 THERAPEUTIC EXERCISES: CPT

## 2021-12-16 PROCEDURE — 97140 MANUAL THERAPY 1/> REGIONS: CPT

## 2021-12-17 ENCOUNTER — HOSPITAL ENCOUNTER (OUTPATIENT)
Dept: INFUSION CENTER | Facility: CLINIC | Age: 61
Discharge: HOME | End: 2021-12-17
Payer: COMMERCIAL

## 2021-12-17 VITALS
DIASTOLIC BLOOD PRESSURE: 65 MMHG | RESPIRATION RATE: 16 BRPM | TEMPERATURE: 97.4 F | HEART RATE: 82 BPM | SYSTOLIC BLOOD PRESSURE: 129 MMHG

## 2021-12-17 DIAGNOSIS — D50.9 IRON DEFICIENCY ANEMIA, UNSPECIFIED IRON DEFICIENCY ANEMIA TYPE: Primary | ICD-10-CM

## 2021-12-17 PROCEDURE — 96365 THER/PROPH/DIAG IV INF INIT: CPT

## 2021-12-17 RX ORDER — SODIUM CHLORIDE 9 MG/ML
20 INJECTION, SOLUTION INTRAVENOUS ONCE
Status: COMPLETED | OUTPATIENT
Start: 2021-12-17 | End: 2021-12-17

## 2021-12-17 RX ORDER — SODIUM CHLORIDE 9 MG/ML
20 INJECTION, SOLUTION INTRAVENOUS ONCE
Status: CANCELLED | OUTPATIENT
Start: 2021-12-23

## 2021-12-17 RX ADMIN — SODIUM CHLORIDE 20 ML/HR: 0.9 INJECTION, SOLUTION INTRAVENOUS at 13:55

## 2021-12-17 RX ADMIN — IRON SUCROSE 300 MG: 20 INJECTION, SOLUTION INTRAVENOUS at 13:56

## 2021-12-21 ENCOUNTER — OFFICE VISIT (OUTPATIENT)
Dept: OCCUPATIONAL THERAPY | Facility: CLINIC | Age: 61
End: 2021-12-21
Payer: COMMERCIAL

## 2021-12-21 ENCOUNTER — EVALUATION (OUTPATIENT)
Dept: PHYSICAL THERAPY | Facility: CLINIC | Age: 61
End: 2021-12-21
Payer: COMMERCIAL

## 2021-12-21 DIAGNOSIS — R60.0 LEG EDEMA, LEFT: ICD-10-CM

## 2021-12-21 DIAGNOSIS — G89.29 CHRONIC PAIN OF LEFT ANKLE: Primary | ICD-10-CM

## 2021-12-21 DIAGNOSIS — M25.572 CHRONIC PAIN OF LEFT ANKLE: Primary | ICD-10-CM

## 2021-12-21 DIAGNOSIS — M54.16 LUMBAR RADICULOPATHY: Primary | ICD-10-CM

## 2021-12-21 PROCEDURE — 97110 THERAPEUTIC EXERCISES: CPT | Performed by: PHYSICAL THERAPIST

## 2021-12-21 PROCEDURE — 97140 MANUAL THERAPY 1/> REGIONS: CPT

## 2021-12-21 PROCEDURE — 97016 VASOPNEUMATIC DEVICE THERAPY: CPT

## 2021-12-22 ENCOUNTER — OFFICE VISIT (OUTPATIENT)
Dept: GASTROENTEROLOGY | Facility: CLINIC | Age: 61
End: 2021-12-22
Payer: COMMERCIAL

## 2021-12-22 VITALS
SYSTOLIC BLOOD PRESSURE: 124 MMHG | HEART RATE: 104 BPM | HEIGHT: 63 IN | OXYGEN SATURATION: 98 % | DIASTOLIC BLOOD PRESSURE: 84 MMHG | WEIGHT: 208 LBS | BODY MASS INDEX: 36.86 KG/M2

## 2021-12-22 DIAGNOSIS — K21.9 CHRONIC GERD: ICD-10-CM

## 2021-12-22 DIAGNOSIS — Z12.11 COLON CANCER SCREENING: ICD-10-CM

## 2021-12-22 DIAGNOSIS — D50.9 IRON DEFICIENCY ANEMIA, UNSPECIFIED IRON DEFICIENCY ANEMIA TYPE: Primary | ICD-10-CM

## 2021-12-22 PROCEDURE — 3008F BODY MASS INDEX DOCD: CPT | Performed by: PHYSICIAN ASSISTANT

## 2021-12-22 PROCEDURE — 99204 OFFICE O/P NEW MOD 45 MIN: CPT | Performed by: PHYSICIAN ASSISTANT

## 2021-12-22 NOTE — H&P (VIEW-ONLY)
[facility] - Outpatient Consultation  Henrietta Pod 63 y o  female MRN: [id number]  Encounter: [id number]          ASSESSMENT AND PLAN:      1  Iron deficiency anemia, unspecified iron deficiency anemia type  2  Chronic GERD  3  Colon cancer screening  - She presents for endoscopic evaluation due to ESTELA and a Hb of 8 5 noted in October when she was having symptomatic anemia but no overt bleeding  - Schedule EGD/colonoscopy to evaluate for causes of blood loss; if unremarkable she will need a pill camera  - Continue iron infusions with hematology  - Bone marrow biopsy in January per hematology  ______________________________________________________________________    HPI:  Jose L Mak is a 64 yo F Presenting to schedule endoscopic evaluation for iron deficiency anemia  She has been seeing Hematology due to a hemoglobin of 8 5 which was diagnosed in October when she was feeling fatigued, lightheaded, and having presyncopal symptoms  She denies any overt bleeding including melena, hematochezia, epistaxis, or vaginal bleeding  She reports that she has been anemic most of her life and used to experience heavy periods but she is now status post hysterectomy  She is not on any anticoagulation  She had an EGD and colonoscopy about 6 years ago at [facility] and she believes this showed esophagitis or an ulcer but was otherwise normal  She has chronic GERD and takes omeprazole 20 mg daily which controls her symptoms well  She is scheduled for a bone marrow biopsy in January  She is currently receiving iron infusions and is slowly starting to feel better  She has never had a pill camera in the past       REVIEW OF SYSTEMS:    CONSTITUTIONAL: Denies any fever, chills, rigors, and weight loss  HEENT: No earache or tinnitus  Denies hearing loss or visual disturbances  CARDIOVASCULAR: No chest pain or palpitations     RESPIRATORY: Denies any cough, hemoptysis, shortness of breath or dyspnea on exertion  GASTROINTESTINAL: As noted in the History of Present Illness  GENITOURINARY: No problems with urination  Denies any hematuria or dysuria  NEUROLOGIC: No dizziness or vertigo, denies headaches  MUSCULOSKELETAL: Denies any muscle or joint pain  SKIN: Denies skin rashes or itching  ENDOCRINE: Denies excessive thirst  Denies intolerance to heat or cold  PSYCHOSOCIAL: Denies depression or anxiety  Denies any recent memory loss  Historical Information   Past Medical History:   Diagnosis Date    Right trigger finger      Past Surgical History:   Procedure Laterality Date    ANKLE HARDWARE REMOVAL       Social History   Social History     Substance and Sexual Activity   Alcohol Use Not Currently     Social History     Substance and Sexual Activity   Drug Use Never     Social History     Tobacco Use   Smoking Status Never Smoker   Smokeless Tobacco Never Used     Family History   Problem Relation Age of Onset    Hypertension Mother     Diabetes Brother        Meds/Allergies       Current Outpatient Medications:     Ascorbic Acid (vitamin C) 1000 MG tablet    cyclobenzaprine (FLEXERIL) 10 mg tablet    ergocalciferol (VITAMIN D2) 50,000 units    Omeprazole 20 MG TBDD    SUMAtriptan (Imitrex) 25 mg tablet    VITAMIN D PO    Allergies   Allergen Reactions    Benadryl [Diphenhydramine] Anaphylaxis    Erythromycin Hives           Objective     Blood pressure 124/84, pulse 104, height 5' 3" (1 6 m), weight 94 3 kg (208 lb), SpO2 98 %  Body mass index is 36 85 kg/m²  PHYSICAL EXAM:      General Appearance:   Alert, cooperative, no distress   HEENT:   Normocephalic, atraumatic, anicteric      Neck:  Supple, symmetrical, trachea midline   Lungs:   Clear to auscultation bilaterally; no rales, rhonchi or wheezing; respirations unlabored    Heart[de-identified]   Regular rate and rhythm; no murmur, rub, or gallop     Abdomen:   Soft, non-tender, non-distended; normal bowel sounds; no masses, no organomegaly    Genitalia:   Deferred    Rectal:   Deferred    Extremities:  No cyanosis, clubbing or edema    Pulses:  2+ and symmetric    Skin:  No jaundice, rashes, or lesions    Lymph nodes:  No palpable cervical lymphadenopathy        Lab Results:   No visits with results within 1 Day(s) from this visit  Latest known visit with results is:   Appointment on 11/30/2021   Component Date Value    Scan Result 11/30/2021 SEE WRITTEN REPORT     Vitamin B-12 11/30/2021 1,139*    Folate 11/30/2021 15 7     WBC 11/30/2021 10 57*    RBC 11/30/2021 4 73     Hemoglobin 11/30/2021 8 9*    Hematocrit 11/30/2021 32 4*    MCV 11/30/2021 69*    MCH 11/30/2021 18 8*    MCHC 11/30/2021 27 5*    RDW 11/30/2021 18 5*    Platelets 19/13/7928 259     Iron Saturation 11/30/2021 5*    TIBC 11/30/2021 442     Iron 11/30/2021 20*    Ferritin 11/30/2021 9          Radiology Results:   No results found

## 2021-12-23 ENCOUNTER — OFFICE VISIT (OUTPATIENT)
Dept: OCCUPATIONAL THERAPY | Facility: CLINIC | Age: 61
End: 2021-12-23
Payer: COMMERCIAL

## 2021-12-23 ENCOUNTER — HOSPITAL ENCOUNTER (OUTPATIENT)
Dept: INFUSION CENTER | Facility: CLINIC | Age: 61
Discharge: HOME | End: 2021-12-23
Payer: COMMERCIAL

## 2021-12-23 ENCOUNTER — OFFICE VISIT (OUTPATIENT)
Dept: PHYSICAL THERAPY | Facility: CLINIC | Age: 61
End: 2021-12-23
Payer: COMMERCIAL

## 2021-12-23 VITALS
TEMPERATURE: 96.9 F | SYSTOLIC BLOOD PRESSURE: 146 MMHG | DIASTOLIC BLOOD PRESSURE: 84 MMHG | HEART RATE: 101 BPM | RESPIRATION RATE: 20 BRPM

## 2021-12-23 DIAGNOSIS — M25.572 CHRONIC PAIN OF LEFT ANKLE: Primary | ICD-10-CM

## 2021-12-23 DIAGNOSIS — R60.0 LEG EDEMA, LEFT: ICD-10-CM

## 2021-12-23 DIAGNOSIS — D50.9 IRON DEFICIENCY ANEMIA, UNSPECIFIED IRON DEFICIENCY ANEMIA TYPE: Primary | ICD-10-CM

## 2021-12-23 DIAGNOSIS — M54.16 LUMBAR RADICULOPATHY: Primary | ICD-10-CM

## 2021-12-23 DIAGNOSIS — G89.29 CHRONIC PAIN OF LEFT ANKLE: Primary | ICD-10-CM

## 2021-12-23 PROCEDURE — 96365 THER/PROPH/DIAG IV INF INIT: CPT

## 2021-12-23 PROCEDURE — 97110 THERAPEUTIC EXERCISES: CPT

## 2021-12-23 PROCEDURE — 97016 VASOPNEUMATIC DEVICE THERAPY: CPT

## 2021-12-23 PROCEDURE — 97112 NEUROMUSCULAR REEDUCATION: CPT

## 2021-12-23 PROCEDURE — 97530 THERAPEUTIC ACTIVITIES: CPT

## 2021-12-23 PROCEDURE — 97140 MANUAL THERAPY 1/> REGIONS: CPT

## 2021-12-23 RX ORDER — SODIUM CHLORIDE 9 MG/ML
20 INJECTION, SOLUTION INTRAVENOUS ONCE
Status: COMPLETED | OUTPATIENT
Start: 2021-12-23 | End: 2021-12-23

## 2021-12-23 RX ORDER — SODIUM CHLORIDE 9 MG/ML
20 INJECTION, SOLUTION INTRAVENOUS ONCE
Status: CANCELLED | OUTPATIENT
Start: 2021-12-30

## 2021-12-23 RX ADMIN — IRON SUCROSE 300 MG: 20 INJECTION, SOLUTION INTRAVENOUS at 13:49

## 2021-12-23 RX ADMIN — SODIUM CHLORIDE 20 ML/HR: 9 INJECTION, SOLUTION INTRAVENOUS at 13:46

## 2021-12-28 ENCOUNTER — OFFICE VISIT (OUTPATIENT)
Dept: PHYSICAL THERAPY | Facility: CLINIC | Age: 61
End: 2021-12-28
Payer: COMMERCIAL

## 2021-12-28 ENCOUNTER — OFFICE VISIT (OUTPATIENT)
Dept: OCCUPATIONAL THERAPY | Facility: CLINIC | Age: 61
End: 2021-12-28
Payer: COMMERCIAL

## 2021-12-28 DIAGNOSIS — M54.16 LUMBAR RADICULOPATHY: Primary | ICD-10-CM

## 2021-12-28 DIAGNOSIS — G89.29 CHRONIC PAIN OF LEFT ANKLE: Primary | ICD-10-CM

## 2021-12-28 DIAGNOSIS — R60.0 LEG EDEMA, LEFT: ICD-10-CM

## 2021-12-28 DIAGNOSIS — M25.572 CHRONIC PAIN OF LEFT ANKLE: Primary | ICD-10-CM

## 2021-12-28 PROCEDURE — 97140 MANUAL THERAPY 1/> REGIONS: CPT

## 2021-12-28 PROCEDURE — 97016 VASOPNEUMATIC DEVICE THERAPY: CPT

## 2021-12-28 PROCEDURE — 97112 NEUROMUSCULAR REEDUCATION: CPT

## 2021-12-28 PROCEDURE — 97110 THERAPEUTIC EXERCISES: CPT

## 2021-12-28 PROCEDURE — 97530 THERAPEUTIC ACTIVITIES: CPT

## 2021-12-30 ENCOUNTER — OFFICE VISIT (OUTPATIENT)
Dept: PHYSICAL THERAPY | Facility: CLINIC | Age: 61
End: 2021-12-30
Payer: COMMERCIAL

## 2021-12-30 ENCOUNTER — APPOINTMENT (OUTPATIENT)
Dept: OCCUPATIONAL THERAPY | Facility: CLINIC | Age: 61
End: 2021-12-30
Payer: COMMERCIAL

## 2021-12-30 ENCOUNTER — HOSPITAL ENCOUNTER (OUTPATIENT)
Dept: INFUSION CENTER | Facility: CLINIC | Age: 61
Discharge: HOME | End: 2021-12-30
Payer: COMMERCIAL

## 2021-12-30 VITALS
SYSTOLIC BLOOD PRESSURE: 147 MMHG | HEART RATE: 77 BPM | RESPIRATION RATE: 20 BRPM | TEMPERATURE: 97 F | DIASTOLIC BLOOD PRESSURE: 77 MMHG

## 2021-12-30 DIAGNOSIS — D50.9 IRON DEFICIENCY ANEMIA, UNSPECIFIED IRON DEFICIENCY ANEMIA TYPE: Primary | ICD-10-CM

## 2021-12-30 DIAGNOSIS — M54.16 LUMBAR RADICULOPATHY: Primary | ICD-10-CM

## 2021-12-30 PROCEDURE — 97110 THERAPEUTIC EXERCISES: CPT

## 2021-12-30 PROCEDURE — 97112 NEUROMUSCULAR REEDUCATION: CPT

## 2021-12-30 PROCEDURE — 96365 THER/PROPH/DIAG IV INF INIT: CPT

## 2021-12-30 RX ORDER — SODIUM CHLORIDE 9 MG/ML
20 INJECTION, SOLUTION INTRAVENOUS ONCE
Status: CANCELLED | OUTPATIENT
Start: 2021-12-30

## 2021-12-30 RX ORDER — SODIUM CHLORIDE 9 MG/ML
20 INJECTION, SOLUTION INTRAVENOUS ONCE
Status: COMPLETED | OUTPATIENT
Start: 2021-12-30 | End: 2021-12-30

## 2021-12-30 RX ADMIN — IRON SUCROSE 300 MG: 20 INJECTION, SOLUTION INTRAVENOUS at 14:07

## 2021-12-30 RX ADMIN — SODIUM CHLORIDE 20 ML/HR: 0.9 INJECTION, SOLUTION INTRAVENOUS at 14:07

## 2021-12-30 NOTE — PROGRESS NOTES
Pt presented for IV venofer, offering no complaints  Pt tolerated entire infusion without incident  PIV removed, Pt declined AVS, pt was discharged in stable condition

## 2022-01-04 ENCOUNTER — OFFICE VISIT (OUTPATIENT)
Dept: PHYSICAL THERAPY | Facility: CLINIC | Age: 62
End: 2022-01-04
Payer: COMMERCIAL

## 2022-01-04 ENCOUNTER — OFFICE VISIT (OUTPATIENT)
Dept: OCCUPATIONAL THERAPY | Facility: CLINIC | Age: 62
End: 2022-01-04
Payer: COMMERCIAL

## 2022-01-04 DIAGNOSIS — M25.572 CHRONIC PAIN OF LEFT ANKLE: Primary | ICD-10-CM

## 2022-01-04 DIAGNOSIS — G89.29 CHRONIC PAIN OF LEFT ANKLE: Primary | ICD-10-CM

## 2022-01-04 DIAGNOSIS — R60.0 LEG EDEMA, LEFT: ICD-10-CM

## 2022-01-04 DIAGNOSIS — M54.16 LUMBAR RADICULOPATHY: Primary | ICD-10-CM

## 2022-01-04 PROCEDURE — 97140 MANUAL THERAPY 1/> REGIONS: CPT

## 2022-01-04 PROCEDURE — 97110 THERAPEUTIC EXERCISES: CPT

## 2022-01-04 PROCEDURE — 97016 VASOPNEUMATIC DEVICE THERAPY: CPT

## 2022-01-04 PROCEDURE — 97112 NEUROMUSCULAR REEDUCATION: CPT

## 2022-01-04 NOTE — PROGRESS NOTES
Daily Note     Today's date: 2022  Patient name: Nancy Limon  : 1960  MRN: [id number]  Referring provider: Yannick Solorio DO  Dx:   Encounter Diagnosis     ICD-10-CM    1  Chronic pain of left ankle  M25 572     G89 29    2  Leg edema, left  R60 0                   Subjective: "I think my ankles almost look the same now"  Objective: See treatment diary below      Assessment: Tolerated treatment well  Pt continues to progress towards goals of lymph reduction to LLE with improvement to lateral malleolus w/assist of k-tape technique, compression pumps and there ex  Patient would benefit from continued OT    Compression pump order sent to [facility]  Plan: Continue per plan of care  Precautions:  GERD; low back pain   Jobst Relief BK, 20-30mmHg; Black, closed toe, silicone border; Size L, full calf    Date    Visit 11 12 8 9 10   Manuals        MLD trunk 10'' 15' 15' 15' 15'   MLD LLE 30 20' 20 25' 25'   Kinesio tape BK fancuts LLE w/size F tg 10' BK fancuts ankle to knee and fan cuts covering ankles LLE 5' BK fancuts ankle to knee and fan cuts covering ankles LLE    5' BK fancuts and size F tg 5' Bk fancuts with size F tg   Compression  tg left at home as reported by pt this session     Size E tg BK Size F tg BK LLE                        Neuro Re-Ed                                                                 Ther Ex        Heel pumps  x10  x10    Toe crunches  x10  x10    Heel slides  x10                                              Ther Activity                        HEP                        Modalities        Sequential compression pump LLE 35mmHg, 15'  LLE 40mmHG LLE  15' 30mmHG LLE 15' 30 mmHg, 15' LLE 40mmHg, 15' LLE

## 2022-01-04 NOTE — PROGRESS NOTES
Daily Note     Today's date: 2022  Patient name: Lawson Polk  : 1960  MRN: [id number]  Referring provider: Kiera Chase DO  Dx:   Encounter Diagnosis     ICD-10-CM    1  Lumbar radiculopathy  M54 16        Start Time:           Subjective: Pt noted having a little bit of pain this morning  Objective: See treatment diary below      Assessment:  Continued with treatment session, overall patient able ot complete all exercises with no increase in pain  Tolerated treatment fair  Patient exhibited good technique with therapeutic exercises and would benefit from continued PT  Plan: Continue per plan of care  progress patient as she is able  Precautions:  GERD; low back pain     Manuals    Reassessment         GR                                           Neuro Re-Ed             TA iso     5" 2x 10         BFKO     20x 2x 10        Dead bug modified  3"x10          NV   Seated multifidus rotation     GTB 2x10,5" ea  TA marching  L1 5"2x10    10 x ea 2x 10  L2 10x5" L2  10x5"   2x 10 L1    Multifidus antirotation standing  [place]  3#  10"x10 Abdi 3# 10" x 10  Abdi 10" x 10 5#    GTB  5x ea  Abdi: 3# 10x10" [place]: 3# 10x10"  [place] 3# 10" x 10                 Ther Ex             Nustep: UE/LE Nustep L6 10 min Bike 10 min  Nustep L6 10 min   L6 10 min L6 10 min  Recumbent bike 10 min Nustep L6 10 min Nustep L6t 10 min nustep L6 10 min    Webslide: L row [place]  15#  3x10 [place] 15# 3x 10  [place] 15# 3x 10  And M   L and M GTB 2x 10  L and M GTB 2x 10  [place]: L, M 15# 3x10 3" Abdi: L, M 15# 3x10 3"  [place] 15# 3x 10    Clamshells     2x10 2x 10   2x10      Bridging     2x10 2x 10   X 9 reps (started to get a spasm in her back)     Piriformis str   30"x3 ea 30" x 3  30" x 3   30" x 3 30" x 3   30"x3  30"  X 3    Patient education         GR                              Ther Activity             Total gym: squats L22 3x10 L22 3x 10  L22 3x 10   Squats at bar 2x15   NV,L22 2x10  L22   3 x10  L22 3x 10                 Gait Training                                       Modalities

## 2022-01-06 ENCOUNTER — OFFICE VISIT (OUTPATIENT)
Dept: PHYSICAL THERAPY | Facility: CLINIC | Age: 62
End: 2022-01-06
Payer: COMMERCIAL

## 2022-01-06 DIAGNOSIS — M54.16 LUMBAR RADICULOPATHY: Primary | ICD-10-CM

## 2022-01-06 PROCEDURE — 97110 THERAPEUTIC EXERCISES: CPT

## 2022-01-06 PROCEDURE — 97112 NEUROMUSCULAR REEDUCATION: CPT

## 2022-01-06 PROCEDURE — 97530 THERAPEUTIC ACTIVITIES: CPT

## 2022-01-06 NOTE — PROGRESS NOTES
Daily Note     Today's date: 2022  Patient name: Lynn Cavazos  : 1960  MRN: [id number]  Referring provider: Sachin Neville DO  Dx:   Encounter Diagnosis     ICD-10-CM    1  Lumbar radiculopathy  M54 16                   Subjective: Pt noted that she did have a lot of pain after last treatment session  Pt noted that she does have a 6/10p!         Objective: See treatment diary below      Assessment:  Continued with treatment session, due to patient having pain after last treatment session did not progress patient this visit  No c/o re creating or exacerbating symptoms during this visit  Tolerated treatment fair  Patient exhibited good technique with therapeutic exercises and would benefit from continued PT  S/p treatment session, Pt noted  Still having some pain in her lower back  Advised patient to trial lumbar roll for her lower back in her car due to c/o increased p! While driving  Plan: Continue per plan of care  Precautions:  GERD; low back pain     Manuals    Reassessment         GR                                           Neuro Re-Ed             TA iso             BFKO      2x 10        Dead bug modified  3"x10          NV   Seated multifidus rotation             TA marching  L1 5"2x10     2x 10  L2 10x5" L2  10x5"   2x 10 L1    Multifidus antirotation standing  [place]  3#  10"x10 Abdi 3# 10" x 10  [place] 10" x 10 5#  [place] 10" x 10 5#  GTB  5x ea    Abdi: 3# 10x10" Abdi: 3# 10x10"  [place] 3# 10" x 10    Seated P ball                                                     Ther Ex             Nustep: UE/LE Nustep L6 10 min Bike 10 min  Nustep L6 10 min  nustep l6 10 min   L6 10 min  Recumbent bike 10 min Nustep L6 10 min Nustep L6t 10 min nustep L6 10 min    Webslide: L row [place]  15#  3x10 [place] 15# 3x 10  Abdi 15# 3x 10  And M  abdi 15# 3x 10 and M   L and M GTB 2x 10  [place]: L, M 15# 3x10 3" Abdi: L, M 15# 3x10 3" Abdi 15# 3x 10    Clamshells      2x 10   2x10      Bridging      2x 10   X 9 reps (started to get a spasm in her back)     Piriformis str   30"x3 ea 30" x 3  30" x 3    30" x 3   30"x3  30"  X 3    Patient education         GR                              Ther Activity             Total gym: squats L22 3x10 L22 3x 10  L22 3x 10  L22 3x 10   NV,L22 2x10  L22   3 x10  L22 3x 10                 Gait Training                                       Modalities

## 2022-01-07 ENCOUNTER — TELEPHONE (OUTPATIENT)
Dept: GASTROENTEROLOGY | Facility: HOSPITAL | Age: 62
End: 2022-01-07

## 2022-01-10 ENCOUNTER — ANESTHESIA EVENT (OUTPATIENT)
Dept: GASTROENTEROLOGY | Facility: HOSPITAL | Age: 62
End: 2022-01-10

## 2022-01-10 ENCOUNTER — ANESTHESIA (OUTPATIENT)
Dept: GASTROENTEROLOGY | Facility: HOSPITAL | Age: 62
End: 2022-01-10

## 2022-01-10 ENCOUNTER — HOSPITAL ENCOUNTER (OUTPATIENT)
Dept: GASTROENTEROLOGY | Facility: HOSPITAL | Age: 62
Setting detail: OUTPATIENT SURGERY
Discharge: HOME | End: 2022-01-10
Payer: COMMERCIAL

## 2022-01-10 VITALS
HEART RATE: 94 BPM | OXYGEN SATURATION: 99 % | DIASTOLIC BLOOD PRESSURE: 78 MMHG | WEIGHT: 205.69 LBS | SYSTOLIC BLOOD PRESSURE: 133 MMHG | HEIGHT: 63 IN | BODY MASS INDEX: 36.45 KG/M2 | TEMPERATURE: 97.7 F | RESPIRATION RATE: 18 BRPM

## 2022-01-10 DIAGNOSIS — Z12.11 COLON CANCER SCREENING: ICD-10-CM

## 2022-01-10 DIAGNOSIS — K21.9 CHRONIC GERD: ICD-10-CM

## 2022-01-10 DIAGNOSIS — D50.9 IRON DEFICIENCY ANEMIA, UNSPECIFIED IRON DEFICIENCY ANEMIA TYPE: ICD-10-CM

## 2022-01-10 PROCEDURE — 88305 TISSUE EXAM BY PATHOLOGIST: CPT | Performed by: PATHOLOGY

## 2022-01-10 PROCEDURE — 45378 DIAGNOSTIC COLONOSCOPY: CPT | Performed by: INTERNAL MEDICINE

## 2022-01-10 PROCEDURE — 43239 EGD BIOPSY SINGLE/MULTIPLE: CPT | Performed by: INTERNAL MEDICINE

## 2022-01-10 RX ORDER — SODIUM CHLORIDE, SODIUM LACTATE, POTASSIUM CHLORIDE, CALCIUM CHLORIDE 600; 310; 30; 20 MG/100ML; MG/100ML; MG/100ML; MG/100ML
125 INJECTION, SOLUTION INTRAVENOUS CONTINUOUS
Status: DISCONTINUED | OUTPATIENT
Start: 2022-01-10 | End: 2022-01-14 | Stop reason: HOSPADM

## 2022-01-10 RX ORDER — LIDOCAINE HYDROCHLORIDE 20 MG/ML
INJECTION, SOLUTION EPIDURAL; INFILTRATION; INTRACAUDAL; PERINEURAL AS NEEDED
Status: DISCONTINUED | OUTPATIENT
Start: 2022-01-10 | End: 2022-01-10

## 2022-01-10 RX ORDER — PROPOFOL 10 MG/ML
INJECTION, EMULSION INTRAVENOUS AS NEEDED
Status: DISCONTINUED | OUTPATIENT
Start: 2022-01-10 | End: 2022-01-10

## 2022-01-10 RX ADMIN — LIDOCAINE HYDROCHLORIDE 5 ML: 20 INJECTION, SOLUTION EPIDURAL; INFILTRATION; INTRACAUDAL; PERINEURAL at 07:47

## 2022-01-10 RX ADMIN — PROPOFOL 100 MG: 10 INJECTION, EMULSION INTRAVENOUS at 07:53

## 2022-01-10 RX ADMIN — SODIUM CHLORIDE, SODIUM LACTATE, POTASSIUM CHLORIDE, AND CALCIUM CHLORIDE: .6; .31; .03; .02 INJECTION, SOLUTION INTRAVENOUS at 07:39

## 2022-01-10 RX ADMIN — PROPOFOL 150 MG: 10 INJECTION, EMULSION INTRAVENOUS at 07:47

## 2022-01-10 NOTE — ANESTHESIA PREPROCEDURE EVALUATION
Procedure:  EGD  COLONOSCOPY    Relevant Problems   GI/HEPATIC   (+) Gastroesophageal reflux disease      HEMATOLOGY   (+) Iron deficiency anemia      Obesity  Hgb 8 9  Endorses fatigue, denies CP    Physical Exam    Airway    Mallampati score: II  TM Distance: >3 FB  Neck ROM: full     Dental   Comment: Denies loose teeth,     Cardiovascular  Cardiovascular exam normal    Pulmonary  Pulmonary exam normal     Other Findings  Portions of exam deferred due to low yield and/or unknown COVID status      Anesthesia Plan  ASA Score- 3     Anesthesia Type- IV sedation with anesthesia with ASA Monitors  Additional Monitors:   Airway Plan:           Plan Factors-Exercise tolerance (METS): >4 METS  Chart reviewed  Existing labs reviewed  Patient summary reviewed  Patient is not a current smoker  Induction- intravenous  Postoperative Plan-     Informed Consent- Anesthetic plan and risks discussed with patient  I personally reviewed this patient with the CRNA  Discussed and agreed on the Anesthesia Plan with the ILYA Babb

## 2022-01-10 NOTE — INTERVAL H&P NOTE
H&P reviewed  After examining the patient I find no changes in the patients condition since the H&P had been written      Vitals:    01/10/22 0648   BP: 122/78   Pulse: 82   Resp: 16   Temp: 98 1 °F (36 7 °C)   SpO2: 98%

## 2022-01-10 NOTE — ANESTHESIA POSTPROCEDURE EVALUATION
Post-Op Assessment Note    CV Status:  Stable  Pain Score: 0    Pain management: adequate     Mental Status:  Awake   Hydration Status:  Stable   PONV Controlled:  None   Airway Patency:  Patent      Post Op Vitals Reviewed: Yes      Staff: CRNA         No complications documented      BP   125/87   Temp      Pulse  77   Resp   20   SpO2   100

## 2022-01-11 ENCOUNTER — OFFICE VISIT (OUTPATIENT)
Dept: OCCUPATIONAL THERAPY | Facility: CLINIC | Age: 62
End: 2022-01-11
Payer: COMMERCIAL

## 2022-01-11 ENCOUNTER — OFFICE VISIT (OUTPATIENT)
Dept: PHYSICAL THERAPY | Facility: CLINIC | Age: 62
End: 2022-01-11
Payer: COMMERCIAL

## 2022-01-11 DIAGNOSIS — M25.572 CHRONIC PAIN OF LEFT ANKLE: Primary | ICD-10-CM

## 2022-01-11 DIAGNOSIS — M54.16 LUMBAR RADICULOPATHY: Primary | ICD-10-CM

## 2022-01-11 DIAGNOSIS — G89.29 CHRONIC PAIN OF LEFT ANKLE: Primary | ICD-10-CM

## 2022-01-11 DIAGNOSIS — R60.0 LEG EDEMA, LEFT: ICD-10-CM

## 2022-01-11 PROCEDURE — 97140 MANUAL THERAPY 1/> REGIONS: CPT

## 2022-01-11 PROCEDURE — 97112 NEUROMUSCULAR REEDUCATION: CPT | Performed by: PHYSICAL THERAPIST

## 2022-01-11 NOTE — PROGRESS NOTES
Daily Note     Today's date: 2022  Patient name: Jana Hauser  : 1960  MRN: [id number]  Referring provider: Megan Choi DO  Dx:   Encounter Diagnosis     ICD-10-CM    1  Lumbar radiculopathy  M54 16        Start Time: 1032  Stop Time: 1115  Total time in clinic (min): 43 minutes    Subjective: Patient reports that her back feels better today  Objective: See treatment diary below      Assessment: Tolerated treatment well  Patient demonstrated fatigue post treatment and would benefit from continued PT  Patient challenged with progressions to core stabilization and strengthening exercises  Patient with mild hip cramping during alt marching on PB  Plan: Continue per plan of care  Progress treatment as tolerated  Precautions:  GERD; low back pain     Manuals         Reassessment                                                    Neuro Re-Ed             TA iso             BFKO             Dead bug modified  3"x10             Seated multifidus rotation             TA marching  L1 5"2x10    L2 2x10 5"        Multifidus antirotation standing  [place]  3#  10"x10 Abdi 3# 10" x 10  [place] 10" x 10 5#  Abdi 10" x 10 5#         Seated P ball marching     2x10 3" ea  Seated P ball lifts with DB     15x3" ea  4# DB                                  Ther Ex             Nustep: UE/LE Nustep L6 10 min Bike 10 min  Nustep L6 10 min  nustep l6 10 min  Nustep L6 10 min        Webslide: L row [place]  15#  3x10 Abdi 15# 3x 10  Abdi 15# 3x 10  And M  abdi 15# 3x 10 and M          Clamshells             Bridging             Piriformis str   30"x3 ea 30" x 3  30" x 3           Patient education                                       Ther Activity             Total gym: squats L22 3x10 L22 3x 10  L22 3x 10  L22 3x 10  L22 3x10                     Gait Training                                       Modalities
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
## 2022-01-12 ENCOUNTER — HOSPITAL ENCOUNTER (OUTPATIENT)
Dept: CT IMAGING | Facility: HOSPITAL | Age: 62
Discharge: HOME | End: 2022-01-12
Admitting: RADIOLOGY
Payer: COMMERCIAL

## 2022-01-12 VITALS
BODY MASS INDEX: 36.88 KG/M2 | HEART RATE: 65 BPM | OXYGEN SATURATION: 100 % | DIASTOLIC BLOOD PRESSURE: 78 MMHG | WEIGHT: 208.11 LBS | HEIGHT: 63 IN | TEMPERATURE: 97.6 F | SYSTOLIC BLOOD PRESSURE: 130 MMHG | RESPIRATION RATE: 16 BRPM

## 2022-01-12 DIAGNOSIS — D72.829 LEUCOCYTOSIS: ICD-10-CM

## 2022-01-12 PROCEDURE — 88342 IMHCHEM/IMCYTCHM 1ST ANTB: CPT | Performed by: PATHOLOGY

## 2022-01-12 PROCEDURE — 85097 BONE MARROW INTERPRETATION: CPT | Performed by: PATHOLOGY

## 2022-01-12 PROCEDURE — 88341 IMHCHEM/IMCYTCHM EA ADD ANTB: CPT | Performed by: PATHOLOGY

## 2022-01-12 PROCEDURE — 38222 DX BONE MARROW BX & ASPIR: CPT

## 2022-01-12 PROCEDURE — 88311 DECALCIFY TISSUE: CPT | Performed by: PATHOLOGY

## 2022-01-12 PROCEDURE — 88364 INSITU HYBRIDIZATION (FISH): CPT | Performed by: PATHOLOGY

## 2022-01-12 PROCEDURE — 77012 CT SCAN FOR NEEDLE BIOPSY: CPT | Performed by: RADIOLOGY

## 2022-01-12 PROCEDURE — 88313 SPECIAL STAINS GROUP 2: CPT | Performed by: PATHOLOGY

## 2022-01-12 PROCEDURE — 88262 CHROMOSOME ANALYSIS 15-20: CPT | Performed by: STUDENT IN AN ORGANIZED HEALTH CARE EDUCATION/TRAINING PROGRAM

## 2022-01-12 PROCEDURE — 38222 DX BONE MARROW BX & ASPIR: CPT | Performed by: RADIOLOGY

## 2022-01-12 PROCEDURE — 88305 TISSUE EXAM BY PATHOLOGIST: CPT | Performed by: PATHOLOGY

## 2022-01-12 PROCEDURE — 88185 FLOWCYTOMETRY/TC ADD-ON: CPT

## 2022-01-12 PROCEDURE — 88184 FLOWCYTOMETRY/ TC 1 MARKER: CPT | Performed by: STUDENT IN AN ORGANIZED HEALTH CARE EDUCATION/TRAINING PROGRAM

## 2022-01-12 PROCEDURE — 88237 TISSUE CULTURE BONE MARROW: CPT | Performed by: STUDENT IN AN ORGANIZED HEALTH CARE EDUCATION/TRAINING PROGRAM

## 2022-01-12 PROCEDURE — 88365 INSITU HYBRIDIZATION (FISH): CPT | Performed by: PATHOLOGY

## 2022-01-12 PROCEDURE — 99152 MOD SED SAME PHYS/QHP 5/>YRS: CPT

## 2022-01-12 PROCEDURE — 99152 MOD SED SAME PHYS/QHP 5/>YRS: CPT | Performed by: RADIOLOGY

## 2022-01-12 RX ORDER — FENTANYL CITRATE 50 UG/ML
INJECTION, SOLUTION INTRAMUSCULAR; INTRAVENOUS CODE/TRAUMA/SEDATION MEDICATION
Status: COMPLETED | OUTPATIENT
Start: 2022-01-12 | End: 2022-01-12

## 2022-01-12 RX ORDER — SODIUM CHLORIDE 9 MG/ML
75 INJECTION, SOLUTION INTRAVENOUS CONTINUOUS
Status: DISCONTINUED | OUTPATIENT
Start: 2022-01-12 | End: 2022-01-16 | Stop reason: HOSPADM

## 2022-01-12 RX ORDER — MIDAZOLAM HYDROCHLORIDE 2 MG/2ML
INJECTION, SOLUTION INTRAMUSCULAR; INTRAVENOUS CODE/TRAUMA/SEDATION MEDICATION
Status: COMPLETED | OUTPATIENT
Start: 2022-01-12 | End: 2022-01-12

## 2022-01-12 RX ORDER — LIDOCAINE WITH 8.4% SOD BICARB 0.9%(10ML)
SYRINGE (ML) INJECTION CODE/TRAUMA/SEDATION MEDICATION
Status: COMPLETED | OUTPATIENT
Start: 2022-01-12 | End: 2022-01-12

## 2022-01-12 RX ADMIN — MIDAZOLAM HYDROCHLORIDE 1 MG: 1 INJECTION, SOLUTION INTRAMUSCULAR; INTRAVENOUS at 10:35

## 2022-01-12 RX ADMIN — MIDAZOLAM HYDROCHLORIDE 0.5 MG: 1 INJECTION, SOLUTION INTRAMUSCULAR; INTRAVENOUS at 10:38

## 2022-01-12 RX ADMIN — SODIUM CHLORIDE 75 ML/HR: 0.9 INJECTION, SOLUTION INTRAVENOUS at 09:09

## 2022-01-12 RX ADMIN — FENTANYL CITRATE 50 MCG: 50 INJECTION, SOLUTION INTRAMUSCULAR; INTRAVENOUS at 10:38

## 2022-01-12 RX ADMIN — MIDAZOLAM HYDROCHLORIDE 1 MG: 1 INJECTION, SOLUTION INTRAMUSCULAR; INTRAVENOUS at 10:30

## 2022-01-12 RX ADMIN — FENTANYL CITRATE 50 MCG: 50 INJECTION, SOLUTION INTRAMUSCULAR; INTRAVENOUS at 10:35

## 2022-01-12 RX ADMIN — FENTANYL CITRATE 50 MCG: 50 INJECTION, SOLUTION INTRAMUSCULAR; INTRAVENOUS at 10:31

## 2022-01-12 RX ADMIN — Medication 5 ML: at 10:38

## 2022-01-12 NOTE — INTERVAL H&P NOTE
Patient arrived to IR for bone marrow biopsy    The procedure and risks were discussed with the patient  All questions were answered  Informed consent was obtained  H & P reviewed after examining the patient and I find no changes in the patient condition since the H & P has been written  /80   Pulse 81   Temp (!) 97 4 °F (36 3 °C) (Temporal)   Resp 16   Ht 5' 3" (1 6 m)   Wt 94 4 kg (208 lb 1 8 oz)   SpO2 100%   BMI 36 87 kg/m²     Patient re-evaluated   Accept as history and physical     Rajesh Faulkner, DO/January 12, 2022/9:01 AM

## 2022-01-12 NOTE — DISCHARGE INSTRUCTIONS
Bone Marrow Biopsy     WHAT YOU NEED TO KNOW:   A bone marrow biopsy is a procedure to remove a small amount of bone marrow from your bone  Bone marrow is the soft tissue inside your bone that helps to make blood cells  The sample is tested for disease or infection  DISCHARGE INSTRUCTIONS:     1  Limit your activities day of biopsy as directed by your doctor  2  Use medication as ordered  3  Return to your normal diet  Small sips of flat soda will help with nausea  4  Remove band-aid or dressing 24 hours after procedure  Contact Interventional Radiology at [phone] Nick PATIENTS: Contact Interventional Radiology at [phone]) Kami Sandoval PATIENTS: Contact Interventional Radiology at [phone]) if:    1  Difficulty breathing, nausea or vomiting  2  Chills or fever above 101 F     3  Pain at biopsy site not relieved by medication  4  Develop any redness, swelling, heat, unusual drainage, heavy bruising or bleeding from biopsy site  Bone Marrow Biopsy   WHAT YOU NEED TO KNOW:   A bone marrow biopsy is a procedure to remove a small amount of bone marrow from your bone  Bone marrow is the soft tissue inside your bone that helps to make blood cells  Bone marrow biopsies are usually done on the pelvic bone  The sample is tested for disease or infection  DISCHARGE INSTRUCTIONS:   Seek care immediately if:   · You have severe pain in the biopsy area  · Blood soaks through your bandage  Contact your healthcare provider if:   · You have a fever or chills  · Your biopsy area is red, swollen, or draining pus  · You have questions or concerns about your condition or care  Medicines:   · Acetaminophen  decreases pain and fever  It is available without a doctor's order  Ask how much to take and how often to take it  Follow directions  Acetaminophen can cause liver damage if not taken correctly  · Take your medicine as directed    Contact your healthcare provider if you think your medicine is not helping or if you have side effects  Tell him or her if you are allergic to any medicine  Keep a list of the medicines, vitamins, and herbs you take  Include the amounts, and when and why you take them  Bring the list or the pill bottles to follow-up visits  Carry your medicine list with you in case of an emergency  Self-care:   · Rest as directed  Do not lift heavy objects or do intense exercise for a few days after the procedure  Ask your healthcare provider when you can return to your daily activities  · Care for your biopsy area as directed  You will need to keep the biopsy area dry and covered with a bandage for 24 to 48 hours  Do not take a shower or bath, or get into a hot tub or swimming pool for 24 to 48 hours after your procedure  Follow up with your doctor as directed:  Write down your questions so you remember to ask them during your visits  © [employer] 2021 Information is for End User's use only and may not be sold, redistributed or otherwise used for commercial purposes  All illustrations and images included in CareNotes® are the copyrighted property of A D A M , Inc  or [address] [place] [address] [facility]  The above information is an  only  It is not intended as medical advice for individual conditions or treatments  Talk to your doctor, nurse or pharmacist before following any medical regimen to see if it is safe and effective for you  Procedural Sedation   WHAT YOU NEED TO KNOW:   Procedural sedation is medicine used during procedures to help you feel relaxed and calm  You will remember little to none of the procedure  After sedation you may feel tired, weak, or unsteady on your feet  You may also have trouble concentrating or short-term memory loss  These symptoms should go away in 24 hours or less  DISCHARGE INSTRUCTIONS:   Call 911 or have someone else call for any of the following:   · You have sudden trouble breathing      · You cannot be woken  Return to the emergency department if:   · You have a severe headache or dizziness  · Your heart is beating faster than usual     Contact your healthcare provider if:   · You have a fever or chills  · Your skin is itchy, swollen, or you have a rash  · You have nausea or are vomiting for more than 8 hours after the procedure  · You have questions or concerns about your condition or care  Self-care:   · Have someone stay with you for 24 hours  This person can drive you to errands and help you do things around the house  This person can also watch for problems  · Rest and do quiet activities for 24 hours  Do not exercise, ride a bike, or play sports  Stand up slowly to prevent dizziness and falls  Take short walks around the house with another person  Slowly return to your usual activities the next day  · Do not drive or use dangerous machines or tools for 24 hours  You may injure yourself or others  Examples include a lawnmower, saw, or drill  Do not return to work for 24 hours if you use dangerous machines or tools for work  · Do not make important decisions for 24 hours  For example, do not sign important papers or invest money  · Drink liquids as directed  Liquids help flush the sedation medicine out of your body  Ask how much liquid to drink each day and which liquids are best for you  · Eat small, frequent meals to prevent nausea and vomiting  Start with clear liquids such as juice or broth  If you do not vomit after clear liquids, you can eat your usual foods  · Do not drink alcohol or take medicines that make you drowsy  This includes medicines that help you sleep and anxiety medicines  Ask your healthcare provider if it is safe for you to take pain medicine  Follow up with your healthcare provider as directed:  Write down your questions so you remember to ask them during your visits     © Copyright [employer] 2021 Information is for End User's use only and may not be sold, redistributed or otherwise used for commercial purposes  All illustrations and images included in CareNotes® are the copyrighted property of A D A M [employer] Inc  or Antoni Pickens  The above information is an  only  It is not intended as medical advice for individual conditions or treatments  Talk to your doctor, nurse or pharmacist before following any medical regimen to see if it is safe and effective for you

## 2022-01-13 ENCOUNTER — APPOINTMENT (OUTPATIENT)
Dept: OCCUPATIONAL THERAPY | Facility: CLINIC | Age: 62
End: 2022-01-13
Payer: COMMERCIAL

## 2022-01-13 ENCOUNTER — APPOINTMENT (OUTPATIENT)
Dept: PHYSICAL THERAPY | Facility: CLINIC | Age: 62
End: 2022-01-13
Payer: COMMERCIAL

## 2022-01-13 LAB — SCAN RESULT: NORMAL

## 2022-01-14 ENCOUNTER — TELEPHONE (OUTPATIENT)
Dept: OBGYN CLINIC | Facility: HOSPITAL | Age: 62
End: 2022-01-14

## 2022-01-14 ENCOUNTER — TELEPHONE (OUTPATIENT)
Dept: HEMATOLOGY ONCOLOGY | Facility: CLINIC | Age: 62
End: 2022-01-14

## 2022-01-14 NOTE — TELEPHONE ENCOUNTER
Dr Wisam Yousif  RE: [place]   [phone] jann Hernandez from [employer] will be faxing over documents that needs to be filled out by DR Catherne Heimlich team    Isaiah Salcido asked for a call once we receive the fax     Documents being faxed to : [place] [place]: [phone]

## 2022-01-18 ENCOUNTER — OFFICE VISIT (OUTPATIENT)
Dept: OCCUPATIONAL THERAPY | Facility: CLINIC | Age: 62
End: 2022-01-18
Payer: COMMERCIAL

## 2022-01-18 ENCOUNTER — OFFICE VISIT (OUTPATIENT)
Dept: PHYSICAL THERAPY | Facility: CLINIC | Age: 62
End: 2022-01-18
Payer: COMMERCIAL

## 2022-01-18 ENCOUNTER — APPOINTMENT (OUTPATIENT)
Dept: LAB | Facility: CLINIC | Age: 62
End: 2022-01-18
Payer: COMMERCIAL

## 2022-01-18 DIAGNOSIS — M54.16 LUMBAR RADICULOPATHY: Primary | ICD-10-CM

## 2022-01-18 DIAGNOSIS — M25.572 CHRONIC PAIN OF LEFT ANKLE: Primary | ICD-10-CM

## 2022-01-18 DIAGNOSIS — K21.9 GASTROESOPHAGEAL REFLUX DISEASE, UNSPECIFIED WHETHER ESOPHAGITIS PRESENT: ICD-10-CM

## 2022-01-18 DIAGNOSIS — G89.29 CHRONIC PAIN OF LEFT ANKLE: Primary | ICD-10-CM

## 2022-01-18 PROCEDURE — 86364 TISS TRNSGLTMNASE EA IG CLAS: CPT

## 2022-01-18 PROCEDURE — 97140 MANUAL THERAPY 1/> REGIONS: CPT

## 2022-01-18 PROCEDURE — 86258 DGP ANTIBODY EACH IG CLASS: CPT

## 2022-01-18 PROCEDURE — 82784 ASSAY IGA/IGD/IGG/IGM EACH: CPT

## 2022-01-18 PROCEDURE — 97016 VASOPNEUMATIC DEVICE THERAPY: CPT

## 2022-01-18 PROCEDURE — 86231 EMA EACH IG CLASS: CPT

## 2022-01-18 PROCEDURE — 97112 NEUROMUSCULAR REEDUCATION: CPT | Performed by: PHYSICAL THERAPIST

## 2022-01-18 PROCEDURE — 36415 COLL VENOUS BLD VENIPUNCTURE: CPT

## 2022-01-18 PROCEDURE — 97110 THERAPEUTIC EXERCISES: CPT | Performed by: PHYSICAL THERAPIST

## 2022-01-18 NOTE — PROGRESS NOTES
Daily Note     Today's date: 2022  Patient name: Minnie Bland  : 1960  MRN: [id number]  Referring provider: Peterson San DO  Dx:   Encounter Diagnosis     ICD-10-CM    1  Chronic pain of left ankle  M25 572     G89 29                   Subjective: "I was so happy when I could get my boots on today  They were tight before"  Objective: See treatment diary below      Assessment: Tolerated treatment well  Increased lymph reduction noted to ankle  Patient would benefit from continued OT      Plan: Continue per plan of care  Precautions:  GERD; low back pain   Jobst Relief BK, 20-30mmHg; Black, closed toe, silicone border; Size L, full calf    Date    Visit 11 12 13 14 10   Manuals        MLD trunk 10'' 15' 15' 15' 15'   MLD LLE 30 20' 30 20'' 25'   Kinesio tape BK fancuts LLE w/size F tg 10' BK fancuts ankle to knee and fan cuts covering ankles LLE     5' BK fancuts and basket-weave to ankle  5' Bk fancuts with size F tg   Compression  tg left at home as reported by pt this session     Size E tg BK Size E TG BK                        Neuro Re-Ed                                                                 Ther Ex        Heel pumps  x10  x10    Toe crunches  x10  x10    Heel slides  x10                                              Ther Activity                        HEP                        Modalities        Sequential compression pump LLE 35mmHg, 15'  LLE 40mmHG LLE  15'  30 mmHg, 15' LLE 40mmHg, 15' LLE

## 2022-01-18 NOTE — PROGRESS NOTES
Daily Note     Today's date: 2022  Patient name: Jana Hauser  : 1960  MRN: [id number]  Referring provider: Megan Choi DO  Dx:   Encounter Diagnosis     ICD-10-CM    1  Lumbar radiculopathy  M54 16        Start Time: 1019  Stop Time: 1140  Total time in clinic (min): 55 minutes    Subjective: Patient reports that she has been consistently performing her HEP  Objective: See treatment diary below      Assessment: Tolerated treatment well  Patient demonstrated fatigue post treatment and would benefit from continued PT  Patient with intermittent c/o cramping in her lower back with multifidus activation  Patient otherwise reported increased fatigue and soreness at end of session  Plan: Continue per plan of care  Progress treatment as tolerated  Precautions:  GERD; low back pain     Manuals        Reassessment                                                    Neuro Re-Ed             TA iso             BFKO             Dead bug modified  3"x10             Seated multifidus rotation             TA marching  L1 5"2x10    L2 2x10 5" L2 2x10 5"       Multifidus antirotation standing  [place]  3#  10"x10 Abdi 3# 10" x 10  [place] 10" x 10 5#  Abdi 10" x 10 5#         Seated P ball marching     2x10 3" ea  2x10 3" ea  Seated P ball lifts with DB     15x3" ea  4# DB 15x3" ea  4# DB       [place]: multifidus chops      5# 15x3"                    Ther Ex             Nustep: UE/LE Nustep L6 10 min Bike 10 min  Nustep L6 10 min  nustep l6 10 min  Nustep L6 10 min Nustep L7 10 min       Webslide: L row [place]  15#  3x10 [place] 15# 3x 10  Abdi 15# 3x 10  And M  abdi 15# 3x 10 and M          Clamshells             Bridging      Figure 4 bridging 10x3" ea  Piriformis str   30"x3 ea 30" x 3  30" x 3           Patient education                                       Ther Activity             Total gym: squats L22 3x10 L22 3x 10  L22 3x 10  L22 3x 10 L22 3x10 L22 3x10                    Gait Training                                       Modalities

## 2022-01-19 ENCOUNTER — TELEPHONE (OUTPATIENT)
Dept: OBGYN CLINIC | Facility: HOSPITAL | Age: 62
End: 2022-01-19

## 2022-01-19 LAB
ENDOMYSIUM IGA SER QL: NEGATIVE
GLIADIN PEPTIDE IGA SER-ACNC: 2 UNITS (ref 0–19)
GLIADIN PEPTIDE IGG SER-ACNC: 1 UNITS (ref 0–19)
IGA SERPL-MCNC: 154 MG/DL (ref 87–352)
SCAN RESULT: NORMAL
TTG IGA SER-ACNC: <2 U/ML (ref 0–3)
TTG IGG SER-ACNC: 5 U/ML (ref 0–5)

## 2022-01-19 NOTE — TELEPHONE ENCOUNTER
Patient sees [place] needed the last office note faxed over to them for [facility]    Fax: [phone]  I was able to fax

## 2022-01-20 ENCOUNTER — OFFICE VISIT (OUTPATIENT)
Dept: OCCUPATIONAL THERAPY | Facility: CLINIC | Age: 62
End: 2022-01-20
Payer: COMMERCIAL

## 2022-01-20 ENCOUNTER — EVALUATION (OUTPATIENT)
Dept: PHYSICAL THERAPY | Facility: CLINIC | Age: 62
End: 2022-01-20
Payer: COMMERCIAL

## 2022-01-20 DIAGNOSIS — G89.29 CHRONIC PAIN OF LEFT ANKLE: Primary | ICD-10-CM

## 2022-01-20 DIAGNOSIS — M54.16 LUMBAR RADICULOPATHY: Primary | ICD-10-CM

## 2022-01-20 DIAGNOSIS — M25.572 CHRONIC PAIN OF LEFT ANKLE: Primary | ICD-10-CM

## 2022-01-20 DIAGNOSIS — R60.0 LEG EDEMA, LEFT: ICD-10-CM

## 2022-01-20 PROCEDURE — 97110 THERAPEUTIC EXERCISES: CPT | Performed by: PHYSICAL THERAPIST

## 2022-01-20 PROCEDURE — 97140 MANUAL THERAPY 1/> REGIONS: CPT

## 2022-01-20 PROCEDURE — 97016 VASOPNEUMATIC DEVICE THERAPY: CPT

## 2022-01-20 NOTE — PROGRESS NOTES
Daily Note     Today's date: 2022  Patient name: Nancy Limon  : 1960  MRN: [id number]  Referring provider: Yannick Solorio DO  Dx:   Encounter Diagnosis     ICD-10-CM    1  Chronic pain of left ankle  M25 572     G89 29    2  Leg edema, left  R60 0                   Subjective: I got my compression stockings  They feel really good, but they were expensive  I heard from the pump people  They're working on getting my pump      Objective: See treatment diary below      Assessment: Tolerated treatment well  Patient exhibited good technique with therapeutic exercises  Knee high, Jobst Relief stockings fit well  Patient able to don and doff independently      Plan: Continue per plan of care  Precautions:  GERD; low back pain   Jobst Relief BK, 20-30mmHg; Black, closed toe, silicone border; Size L, full calf    Date    Visit 11 12 13 14 15   Manuals        MLD trunk 10'' 15' 15' 15' 15'   MLD LLE 30 20' 30 20'' 25'   Kinesio tape BK fancuts LLE w/size F tg 10' BK fancuts ankle to knee and fan cuts covering ankles LLE     5' BK fancuts and basket-weave to ankle  Compression  tg left at home as reported by pt this session     Size E tg BK Size E TG BK     Jobst Relief knee high compression stockings 5'                   Neuro Re-Ed                                                                 Ther Ex        Heel pumps  x10  x10 x10   Toe crunches  x10  x10 x10   Heel slides  x10                                              Ther Activity                        HEP                        Modalities        Sequential compression pump LLE 35mmHg, 15'  LLE 40mmHG LLE  15'  30 mmHg, 15' LLE 40mmHg, 15' LLE

## 2022-01-20 NOTE — PROGRESS NOTES
PT Re-Evaluation  and PT Discharge    Today's date: 2022  Patient name: Sana Miller  : 1960  MRN: [id number]  Referring provider: Thi Washington DO  Dx:   Encounter Diagnosis     ICD-10-CM    1  Lumbar radiculopathy  M54 16        Start Time: 1115  Stop Time: 1200  Total time in clinic (min): 45 minutes    Assessment  Assessment details: Since beginning physical therapy, Jasmina Sy has attended a total number of 16 visits and has maintained excellent compliance with established POC  Patient has made significant improvements in all areas, including decreased pain, increased strength, increased ROM, improved flexibility, improved postural awareness and improved overall level of function  Patient is reporting improved ability to perform a/iadls and engaging in social activities  Patient is independent with comprehensive HEP  Patient has been instructed to contact PT if she begins to notice a decline in function or has any questions or concerns  Patient will be discharged at this time  Patient is in agreement with plan  Understanding of Dx/Px/POC: excellent   Prognosis: good    Goals  Short Term Goals: to be achieved by 4 weeks ALL GOALS MET  1) Patient to be independent with basic HEP  2) Decrease pain to 5/10 at its worst   3) Increase lumbar spine ROM by 25% in all deficient planes  4) Increase LE strength by 1/2 MMT grade in all deficient planes  5) Patient to report decreased sleep interruption secondary to pain  6) Increase seated and ambulatory tolerance by 10 min  Long Term Goals: to be achieved by discharge  1) FOTO equal to or greater than TBD  N/A  2) Patient to be independent with comprehensive HEP  MET  3) Abolish pain for improved quality of life  PROGRESSED, BUT NOT MET  4) Lumbar spine ROM WNL all planes to improve a/iadls  PARTIALLY MET  5) Increase LE strength to 5/5 MMT grade in all planes to improve a/iadls   PARTIALLY MET  6) Patient to report no sleep interruption secondary to pain  MET  7) Increase seated and ambulatory tolerance to 60 min  MET    Plan  Planned therapy interventions: home exercise program  Duration in visits: 1  Plan of Care beginning date: 2022  Plan of Care expiration date: 2022  Treatment plan discussed with: patient        Subjective Evaluation    History of Present Illness  Mechanism of injury: Patient reports that her pain is much less frequent and less intense  Patient typically feels her pain more towards the end of the day  Patient is able to perform all a/iadls with less difficulty  Patient continues to have some limitation mopping where she cannot do it all at once  Patient reports that the spasms in her lower back have been significantly improved  Patient does continue to get SOB at times  Patient feels independent with her HEP and is prepared for d/c at this time  Pain  Current pain ratin  At best pain ratin  At worst pain ratin  Location: central low back  Quality: dull ache    Treatments  Current treatment: physical therapy  Patient Goals  Patient goals for therapy: decreased pain          Objective     Postural Observations  Seated posture: fair  Standing posture: fair        Palpation   Left   Tenderness of the erector spinae and lumbar paraspinals  Right   Tenderness of the erector spinae and lumbar paraspinals  Tenderness     Lumbar Spine  Tenderness in the spinous process       Neurological Testing     Sensation     Lumbar   Left   Intact: light touch    Right   Intact: light touch    Active Range of Motion     Lumbar   Normal active range of motion    Joint Play   Joints within functional limits: L3, L4, L5 and S1     Pain: L4, L5 and S1     Strength/Myotome Testing     Left Hip   Planes of Motion   Flexion: 5  Extension: 4+    Right Hip   Planes of Motion   Flexion: 4+  Extension: 4+    Left Knee   Flexion: 5  Extension: 5    Right Knee   Flexion: 5  Extension: 5    Left Ankle/Foot   Dorsiflexion: 5    Right Ankle/Foot   Dorsiflexion: 5    Ambulation     Ambulation: Level Surfaces   Ambulation without assistive device: independent    Observational Gait   Gait: within functional limits     Functional Assessment      Squat    Left within functional limits and right within functional limits  Precautions:  GERD; low back pain     Manuals 12/28 12/30 1/4 1/6 1/11 1/18 1/20      Reassessment       GR                                             Neuro Re-Ed             TA iso             BFKO             Dead bug modified  3"x10             Seated multifidus rotation             TA marching  L1 5"2x10    L2 2x10 5" L2 2x10 5"       Multifidus antirotation standing  Abdi  3#  10"x10 Abdi 3# 10" x 10  [place] 10" x 10 5#  [place] 10" x 10 5#         Seated P ball marching     2x10 3" ea  2x10 3" ea  Seated P ball lifts with DB     15x3" ea  4# DB 15x3" ea  4# DB       [place]: multifidus chops      5# 15x3"                    Ther Ex             Nustep: UE/LE Nustep L6 10 min Bike 10 min  Nustep L6 10 min  nustep l6 10 min  Nustep L6 10 min Nustep L7 10 min Nustep L7 10 min      Webslide: L row Abdi  15#  3x10 [place] 15# 3x 10  [place] 15# 3x 10  And M  abdi 15# 3x 10 and M          Clamshells             Bridging      Figure 4 bridging 10x3" ea  Piriformis str  30"x3 ea 30" x 3  30" x 3           Patient education, HEP review       GR                                Ther Activity             Total gym: squats L22 3x10 L22 3x 10  L22 3x 10  L22 3x 10  L22 3x10 L22 3x10                    Gait Training                                       Modalities                          Access Code: [email or web address]  URL: [email or web address]  Date: 01/20/2022  Prepared by: Jessy Abarca    Exercises  · Supine Transversus Abdominis Bracing - Hands on Stomach - 1 x daily - 4 x weekly - 2 sets - 10 reps - 5 seconds hold  · Supine Single Bent Knee Fallout - 1 x daily - 4 x weekly - 2 sets - 10 reps - 3 seconds hold  · Supine 90/90 Alternating Toe Touch - 1 x daily - 4 x weekly - 2 sets - 10 reps  · Swiss Ball March - 1 x daily - 4 x weekly - 2 sets - 10 reps - 3 seconds hold  · Seated Marching with Opposite Shoulder Flexion - 1 x daily - 4 x weekly - 2 sets - 10 reps - 3 seconds hold  · Standing Anti-Rotation Press with Anchored Resistance - 1 x daily - 4 x weekly - 2 sets - 10 reps - 10 seconds hold  · Anti-Rotation Sidestepping with Resistance - 1 x daily - 4 x weekly - 2 sets - 10 reps - 5 seconds hold  · Supine Bilateral Isometric Hip Flexion - 1 x daily - 4 x weekly - 2 sets - 10 reps - 5 seconds hold  · Dead Bug - 1 x daily - 4 x weekly - 2 sets - 10 reps - 5 seconds hold  · Supine Dead Bug with Leg Extension - 1 x daily - 4 x weekly - 2 sets - 10 reps - 5 seconds hold  · Bird Dog - 1 x daily - 4 x weekly - 2 sets - 10 reps - 5 seconds hold  · Quadruped Alternating Arm Lift - 1 x daily - 4 x weekly - 2 sets - 10 reps - 5 seconds hold  · Quadruped Alternating Leg Extensions - 1 x daily - 4 x weekly - 2 sets - 10 reps - 3 seconds hold  · Seated Chop and Lift with Ball - 1 x daily - 4 x weekly - 2 sets - 10 reps - 5 seconds hold  · Reverse Chop with Resistance - 1 x daily - 4 x weekly - 2 sets - 10 reps - 5 seconds hold  · Standing Diagonal Chop - 1 x daily - 4 x weekly - 2 sets - 10 reps - 5 seconds hold  · Supine Piriformis Stretch with Foot on Ground - 1 x daily - 4 x weekly - 3 sets - 1 reps - 30 seconds hold  · Supine Bridge - 1 x daily - 4 x weekly - 3 sets - 10 reps - 3 seconds hold  · Figure 4 Bridge - 1 x daily - 4 x weekly - 2 sets - 10 reps - 3 seconds hold

## 2022-01-21 ENCOUNTER — OFFICE VISIT (OUTPATIENT)
Dept: GASTROENTEROLOGY | Facility: CLINIC | Age: 62
End: 2022-01-21
Payer: COMMERCIAL

## 2022-01-21 DIAGNOSIS — D50.9 IRON DEFICIENCY ANEMIA, UNSPECIFIED IRON DEFICIENCY ANEMIA TYPE: ICD-10-CM

## 2022-01-24 ENCOUNTER — APPOINTMENT (OUTPATIENT)
Dept: OCCUPATIONAL THERAPY | Facility: CLINIC | Age: 62
End: 2022-01-24
Payer: COMMERCIAL

## 2022-01-24 ENCOUNTER — APPOINTMENT (OUTPATIENT)
Dept: PHYSICAL THERAPY | Facility: CLINIC | Age: 62
End: 2022-01-24
Payer: COMMERCIAL

## 2022-01-25 ENCOUNTER — OFFICE VISIT (OUTPATIENT)
Dept: HEMATOLOGY ONCOLOGY | Facility: CLINIC | Age: 62
End: 2022-01-25
Payer: COMMERCIAL

## 2022-01-25 VITALS
HEART RATE: 82 BPM | HEIGHT: 63 IN | OXYGEN SATURATION: 100 % | SYSTOLIC BLOOD PRESSURE: 138 MMHG | DIASTOLIC BLOOD PRESSURE: 78 MMHG | BODY MASS INDEX: 36.86 KG/M2 | TEMPERATURE: 97.6 F | RESPIRATION RATE: 18 BRPM | WEIGHT: 208 LBS

## 2022-01-25 DIAGNOSIS — D50.9 IRON DEFICIENCY ANEMIA, UNSPECIFIED IRON DEFICIENCY ANEMIA TYPE: ICD-10-CM

## 2022-01-25 DIAGNOSIS — C85.80 MARGINAL ZONE LYMPHOMA (HCC): Primary | ICD-10-CM

## 2022-01-25 PROCEDURE — 3008F BODY MASS INDEX DOCD: CPT | Performed by: INTERNAL MEDICINE

## 2022-01-25 PROCEDURE — 99214 OFFICE O/P EST MOD 30 MIN: CPT | Performed by: INTERNAL MEDICINE

## 2022-01-25 PROCEDURE — 91110 GI TRC IMG INTRAL ESOPH-ILE: CPT | Performed by: INTERNAL MEDICINE

## 2022-01-25 NOTE — PROGRESS NOTES
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: Final Diagnosis   A, B & C  Bone marrow, right iliac, core needle biopsy, aspirate clot section & aspirate:  - Low grade B-cell lymphoma, nonspecific immunophenotype, infiltrating marrow in an interstitial & nodular pattern, comprising 30-35% of cells of an hypercellular for age (65-70%) marrow - see Note         Note    Immunohistochemical stains & colorimetric RNA in situ hybridization (CISH), evaluated with appropriate positive & negative controls, are employed in the enumeration of marrow B-lymphocytes (CD20), T-lymphocytes (CD3) & plasma cells (, kappa-CISH and lambda-CISH)  Results of these studies are given in the final diagnosis and reveal low grade B-cell lymphoma, nonspecific immunophenotype  The differential diagnosis would include (but is not limited to) marginal zone lymphoma (MZL) in leukemic phase (greg, splenic and MALT type) and lymphoplasmacytic lymphoma  SUBJECTIVE      Initial Visit  Chantal Gould is a 63 y o  here for new consultation with me today  The patient is referred by Karon Holter and the reason for consultation is anemia      Her CBC is remarkable for leukocytosis and microcytic anemia with stable platelet count:   8/4/7475  10/23/2021  11/5/2021  11/30/2021    WBC 14 05 (H) 13 76 (H) 14 66 (H) 10 57 (H)   Hemoglobin 12 8 8 5 (L) 8 8 (L) 8 9 (L)   HCT 41 8 30 9 (L) 31 4 (L) 32 4 (L) MCV 82 70 (L) 69 (L) 69 (L)   Platelet Count 445 477 291 259     Her differential is remarkable for increased lymphocytes and basophils:   5/3/2017  10/23/2021  11/5/2021    Immature Grans Absolute  0 06 0 04   Absolute Neutrophils 7 54 6 98 7 55   Lymphocytes Absolute 5 23 (H) 5 76 (H) 5 77 (H)   Absolute Monocytes 0 96 0 70 0 91   Absolute Eosinophils 0 16 0 15 0 23   Basophils Absolute 0 12 (H) 0 11 (H) 0 16 (H)     Her iron panel shows significant iron deficiency:   11/5/2021 11/30/2021    Iron 26 (L) 20 (L)   Ferritin 4 (L) 9   Iron Saturation 5 (L) 5 (L)   TIBC 502 (H) 442      11/30/2021    Folate 15 7   Vitamin B-12 1,139 (H)       During her initial visit, she said that she has been anemic all her life, but it has never been investigated before  She had a hysterectomy for fibroids and secondary menorrhagia 8-9 years ago  She denied any abnormal vaginal bleeding since then  She is due for a colonoscopy and will schedule one after the visit  She denied risk factors for HIV or hepatitis  This Visit    Denies any f/c/n/v/cp/ap/sob/cough  Denies diarrhea or skin rash  I have reviewed the relevant past medical, surgical, social and family history  I have also reviewed allergies and medications for this patient  Review of Systems  Review of Systems   All other systems reviewed and are negative  OBJECTIVE     Physical Exam  Vitals:    01/25/22 1435   BP: 138/78   BP Location: Left arm   Patient Position: Sitting   Cuff Size: Adult   Pulse: 82   Resp: 18   Temp: 97 6 °F (36 4 °C)   SpO2: 100%   Weight: 94 3 kg (208 lb)   Height: 5' 3" (1 6 m)     Physical Exam  Vitals reviewed  Constitutional:       General: She is not in acute distress  Appearance: She is not toxic-appearing  HENT:      Head: Atraumatic  Eyes:      General: No scleral icterus  Conjunctiva/sclera: Conjunctivae normal    Cardiovascular:      Rate and Rhythm: Normal rate     Pulmonary:      Effort: Pulmonary effort is normal    Abdominal:      General: There is no distension  Musculoskeletal:         General: Normal range of motion  Cervical back: Normal range of motion  No rigidity  Neurological:      Mental Status: She is alert  Imaging  Relevant imaging reviewed in chart    Labs  Relevant labs reviewed in chart     ASSESSMENT & PLAN      Diagnosis ICD-10-CM Associated Orders   1  Marginal zone lymphoma (HCC)  C85 80 CBC and differential     Comprehensive metabolic panel     LD,Blood     Chronic Hepatitis Panel   2  Iron deficiency anemia, unspecified iron deficiency anemia type  D50 9 Iron Panel (Includes Ferritin, Iron Sat%, Iron, and TIBC)         63 y o  Female seen in consultation for iron deficiency anemia and unexplained leukocytosis  Further work-up showed diagnosis of Marginal Zone Lymphoma (MZL) from bone marrow biopsy with trisomy 12 detection  Discussion/Plan  For ESTELA - since completion of venofer therapy, recommend repeat labs for assessment in 3 months during return visit  For MZL - given no treatment criteria are being met, recommend surveillance with labs upon return visit  I provided education about her cancer diagnosis and reviewed the marrow pathology with her  Of importance, her MYD88 testing was negative  All questions were answered to the patient's satisfaction during this encounter  They appreciated and thanked me for spending time with them  The patient knows the contact information for our office and know to reach out for any relevant concerns related to this encounter  For all other listed problems and medical diagnosis in his chart - they are managed by PCP and/or other specialists, which patient acknowledges        Dr Rossi Sebsatian

## 2022-01-25 NOTE — PROGRESS NOTES
She is a 16-year-old female referred for video capsule endoscopy for evaluation of iron deficiency anemia  She had endoscopy on January 10, 2022 that showed a 5 cm hiatal hernia, mild nonerosive gastritis and fundal gland polyps  She had a colonoscopy on January 10, 2022 that showed left-sided diverticulosis  The video capsule endoscopy shows 1 small erosion possibly NSAID induced deep in the ileum at 2hrs and 39 minutes  I told her to do on replacement and have her blood count monitored periodically by her primary    I am also recommending gyn evaluation

## 2022-01-27 ENCOUNTER — APPOINTMENT (OUTPATIENT)
Dept: PHYSICAL THERAPY | Facility: CLINIC | Age: 62
End: 2022-01-27
Payer: COMMERCIAL

## 2022-01-27 ENCOUNTER — OFFICE VISIT (OUTPATIENT)
Dept: OCCUPATIONAL THERAPY | Facility: CLINIC | Age: 62
End: 2022-01-27
Payer: COMMERCIAL

## 2022-01-27 DIAGNOSIS — G89.29 CHRONIC PAIN OF LEFT ANKLE: Primary | ICD-10-CM

## 2022-01-27 DIAGNOSIS — M25.572 CHRONIC PAIN OF LEFT ANKLE: Primary | ICD-10-CM

## 2022-01-27 DIAGNOSIS — R60.0 LEG EDEMA, LEFT: ICD-10-CM

## 2022-01-27 PROCEDURE — 97140 MANUAL THERAPY 1/> REGIONS: CPT

## 2022-01-27 NOTE — PROGRESS NOTES
Daily Note/Discharge     Today's date: 2022  Patient name: Lien Burton  : 1960  MRN: [id number]  Referring provider: Javier Vega DO  Dx:   Encounter Diagnosis     ICD-10-CM    1  Chronic pain of left ankle  M25 572     G89 29    2  Leg edema, left  R60 0                   Subjective: I got my pump  I've been using it twice a day  My ankle feels the best it's been in a long time  I can fit in boots now      Objective: See treatment diary below      Assessment: Tolerated treatment well  Patient Patient independent in HEP of self MLD, compression pump, knee high compression stockings, elevation, exercise  Goals  STGs ( 4 weeks)  Patient will be independent in HEP of self MLD, elevation, exercise, skin care, and compression bandaging  MET  Patient will demonstrate 0 5 cm reduction in LLE circumference measurements  MET  Patient will report an average pain level of  5/10  MET  LTGs ( 12 weeks)  Patient will be independent in HEP to maintain LLE decongestion at discharge  MET  Patient will demonstrate maximum decongestion of the LLE to fit into clothing and prevent infection  MET  Patient will report an average pain level of 3/10 to be independent in daily tasks  MET    Plan: DC to HEP  OT goals met     Precautions:  GERD; low back pain   Jobst Relief BK, 20-30mmHg; Black, closed toe, silicone border; Size L, full calf    Date    Visit 16 12 13 14 15   Manuals        MLD trunk 15'' 15' 15' 15' 15'   MLD LLE 30 20' 30 20'' 25'   Kinesio tape  10' BK fancuts ankle to knee and fan cuts covering ankles LLE     5' BK fancuts and basket-weave to ankle       Compression  compression stockings Size E tg BK Size E TG BK     Jobst Relief knee high compression stockings 5'                   Neuro Re-Ed                                                                 Ther Ex        Heel pumps  x10  x10 x10   Toe crunches  x10  x10 x10   Heel slides  x10 Ther Activity                        HEP                        Modalities        Sequential compression pump LLE  40mmHG LLE  15'  30 mmHg, 15' LLE 40mmHg, 15' LLE

## 2022-02-07 ENCOUNTER — OFFICE VISIT (OUTPATIENT)
Dept: FAMILY MEDICINE CLINIC | Facility: CLINIC | Age: 62
End: 2022-02-07
Payer: COMMERCIAL

## 2022-02-07 ENCOUNTER — APPOINTMENT (OUTPATIENT)
Dept: LAB | Facility: CLINIC | Age: 62
End: 2022-02-07
Payer: COMMERCIAL

## 2022-02-07 VITALS
RESPIRATION RATE: 18 BRPM | HEART RATE: 82 BPM | DIASTOLIC BLOOD PRESSURE: 82 MMHG | BODY MASS INDEX: 36.68 KG/M2 | WEIGHT: 207 LBS | TEMPERATURE: 98 F | HEIGHT: 63 IN | SYSTOLIC BLOOD PRESSURE: 124 MMHG | OXYGEN SATURATION: 98 %

## 2022-02-07 DIAGNOSIS — R73.03 PRE-DIABETES: ICD-10-CM

## 2022-02-07 DIAGNOSIS — C85.80 MARGINAL ZONE LYMPHOMA (HCC): ICD-10-CM

## 2022-02-07 DIAGNOSIS — H69.82 DYSFUNCTION OF LEFT EUSTACHIAN TUBE: ICD-10-CM

## 2022-02-07 DIAGNOSIS — D50.9 IRON DEFICIENCY ANEMIA, UNSPECIFIED IRON DEFICIENCY ANEMIA TYPE: ICD-10-CM

## 2022-02-07 DIAGNOSIS — C85.80 MARGINAL ZONE LYMPHOMA (HCC): Primary | ICD-10-CM

## 2022-02-07 DIAGNOSIS — H93.12 TINNITUS OF LEFT EAR: ICD-10-CM

## 2022-02-07 DIAGNOSIS — E55.9 VITAMIN D DEFICIENCY: ICD-10-CM

## 2022-02-07 LAB
25(OH)D3 SERPL-MCNC: 29.4 NG/ML (ref 30–100)
ERYTHROCYTE [DISTWIDTH] IN BLOOD BY AUTOMATED COUNT: 23 % (ref 11.6–15.1)
HCT VFR BLD AUTO: 42.3 % (ref 34.8–46.1)
HGB BLD-MCNC: 13.1 G/DL (ref 11.5–15.4)
MCH RBC QN AUTO: 24.8 PG (ref 26.8–34.3)
MCHC RBC AUTO-ENTMCNC: 31 G/DL (ref 31.4–37.4)
MCV RBC AUTO: 80 FL (ref 82–98)
PLATELET # BLD AUTO: 225 THOUSANDS/UL (ref 149–390)
RBC # BLD AUTO: 5.28 MILLION/UL (ref 3.81–5.12)
WBC # BLD AUTO: 11.32 THOUSAND/UL (ref 4.31–10.16)

## 2022-02-07 PROCEDURE — 85027 COMPLETE CBC AUTOMATED: CPT

## 2022-02-07 PROCEDURE — 83036 HEMOGLOBIN GLYCOSYLATED A1C: CPT

## 2022-02-07 PROCEDURE — 99214 OFFICE O/P EST MOD 30 MIN: CPT | Performed by: PHYSICIAN ASSISTANT

## 2022-02-07 PROCEDURE — 82306 VITAMIN D 25 HYDROXY: CPT

## 2022-02-07 PROCEDURE — 3008F BODY MASS INDEX DOCD: CPT | Performed by: PHYSICIAN ASSISTANT

## 2022-02-07 PROCEDURE — 36415 COLL VENOUS BLD VENIPUNCTURE: CPT

## 2022-02-07 RX ORDER — FLUTICASONE PROPIONATE 50 MCG
1 SPRAY, SUSPENSION (ML) NASAL DAILY
Qty: 16 G | Refills: 1 | Status: SHIPPED | OUTPATIENT
Start: 2022-02-07 | End: 2022-04-12

## 2022-02-07 NOTE — PROGRESS NOTES
Assessment/Plan:    No problem-specific Assessment & Plan notes found for this encounter  Problem List Items Addressed This Visit        Other    Marginal zone lymphoma ([facility] [address] ) - Primary      Other Visit Diagnoses     Tinnitus of left ear        Relevant Medications    fluticasone (FLONASE) 50 mcg/act nasal spray    Dysfunction of left eustachian tube        Relevant Medications    fluticasone (FLONASE) 50 mcg/act nasal spray    Vitamin D deficiency        Relevant Orders    Vitamin D 25 hydroxy    Pre-diabetes        Relevant Orders    HEMOGLOBIN A1C W/ EAG ESTIMATION            Completed iron infusions with much improvement  Continue lymphedema therapy per orthopedics- uses pump twice a day  Repeat vitamin d level- finished 12 weeks of supplementation  Continue multivitamin daily   Left ear tinnitus and congestion- start flonase as directed  Marginal zone Lymphoma- continue following with hematology q3 mo    Subjective:      Patient ID: Jose Alan is a 63 y o  female  Patient here for follow up visit      The following portions of the patient's history were reviewed and updated as appropriate: allergies, current medications, past medical history, past social history, past surgical history and problem list     Review of Systems   Constitutional: Negative for chills, fatigue and fever  HENT: Negative for congestion, ear pain (left ear feels clogged), sinus pain, sore throat and trouble swallowing  Eyes: Negative for pain, discharge and redness  Respiratory: Negative for cough, chest tightness, shortness of breath and wheezing  Cardiovascular: Negative for chest pain, palpitations and leg swelling  Gastrointestinal: Negative for abdominal pain, diarrhea, nausea and vomiting  Musculoskeletal: Negative for arthralgias, joint swelling and myalgias  Skin: Negative for rash  Neurological: Negative for dizziness, weakness, numbness and headaches           Objective:      /82 (BP Location: Left arm, Patient Position: Sitting)   Pulse 82   Temp 98 °F (36 7 °C)   Resp 18   Ht 5' 3" (1 6 m)   Wt 93 9 kg (207 lb)   SpO2 98%   BMI 36 67 kg/m²          Physical Exam  Vitals and nursing note reviewed  Constitutional:       Appearance: She is well-developed  HENT:      Head: Normocephalic  Right Ear: Hearing, tympanic membrane and external ear normal  No drainage  Left Ear: Hearing and external ear normal  No drainage  A middle ear effusion (allergic) is present  Nose: No mucosal edema  Mouth/Throat:      Pharynx: Uvula midline  No posterior oropharyngeal erythema  Cardiovascular:      Rate and Rhythm: Normal rate and regular rhythm  Pulmonary:      Effort: Pulmonary effort is normal       Breath sounds: Normal breath sounds

## 2022-02-09 LAB
EST. AVERAGE GLUCOSE BLD GHB EST-MCNC: 105 MG/DL
HBA1C MFR BLD: 5.3 %

## 2022-02-18 ENCOUNTER — IMMUNIZATIONS (OUTPATIENT)
Dept: FAMILY MEDICINE CLINIC | Facility: HOSPITAL | Age: 62
End: 2022-02-18

## 2022-02-18 PROCEDURE — 91305 COVID-19 PFIZER VACC TRIS-SUCROSE GRAY CAP 0.3 ML: CPT

## 2022-02-18 PROCEDURE — 0051A COVID-19 PFIZER VACC TRIS-SUCROSE GRAY CAP 0.3 ML: CPT

## 2022-03-08 ENCOUNTER — TELEPHONE (OUTPATIENT)
Dept: FAMILY MEDICINE CLINIC | Facility: CLINIC | Age: 62
End: 2022-03-08

## 2022-03-11 ENCOUNTER — IMMUNIZATIONS (OUTPATIENT)
Dept: FAMILY MEDICINE CLINIC | Facility: HOSPITAL | Age: 62
End: 2022-03-11

## 2022-03-11 PROCEDURE — 0054A COVID-19 PFIZER VACC TRIS-SUCROSE GRAY CAP 0.3 ML: CPT

## 2022-03-11 PROCEDURE — 91305 COVID-19 PFIZER VACC TRIS-SUCROSE GRAY CAP 0.3 ML: CPT

## 2022-03-14 ENCOUNTER — HOSPITAL ENCOUNTER (OUTPATIENT)
Dept: MAMMOGRAPHY | Facility: CLINIC | Age: 62
Discharge: HOME | End: 2022-03-14
Payer: COMMERCIAL

## 2022-03-14 VITALS — WEIGHT: 202 LBS | BODY MASS INDEX: 35.79 KG/M2 | HEIGHT: 63 IN

## 2022-03-14 DIAGNOSIS — Z12.31 VISIT FOR SCREENING MAMMOGRAM: ICD-10-CM

## 2022-03-14 PROCEDURE — 77067 SCR MAMMO BI INCL CAD: CPT

## 2022-03-14 PROCEDURE — 77063 BREAST TOMOSYNTHESIS BI: CPT

## 2022-03-23 ENCOUNTER — HOSPITAL ENCOUNTER (OUTPATIENT)
Dept: MAMMOGRAPHY | Facility: CLINIC | Age: 62
Discharge: HOME | End: 2022-03-23
Payer: COMMERCIAL

## 2022-03-23 ENCOUNTER — HOSPITAL ENCOUNTER (OUTPATIENT)
Dept: ULTRASOUND IMAGING | Facility: CLINIC | Age: 62
Discharge: HOME | End: 2022-03-23
Payer: COMMERCIAL

## 2022-03-23 VITALS — WEIGHT: 202 LBS | BODY MASS INDEX: 35.79 KG/M2 | HEIGHT: 63 IN

## 2022-03-23 DIAGNOSIS — R92.8 ABNORMAL MAMMOGRAM: ICD-10-CM

## 2022-03-23 PROCEDURE — 76642 ULTRASOUND BREAST LIMITED: CPT

## 2022-03-23 PROCEDURE — G0279 TOMOSYNTHESIS, MAMMO: HCPCS

## 2022-03-23 PROCEDURE — 77065 DX MAMMO INCL CAD UNI: CPT

## 2022-03-23 NOTE — PROGRESS NOTES
Met with patient and Dr Michelle Gonzáles regarding recommendation for;    ___X__ RIGHT ______LEFT      __X___Ultrasound guided  ______Stereotactic breast biopsy  __X___Verbalized understanding        Blood thinners:  No: ___X__ Yes: ______ What:                 Biopsy teaching sheet given:  Yes: ___X___ No: ________    Pt given contact information and adv to call with any questions/needs

## 2022-04-05 ENCOUNTER — HOSPITAL ENCOUNTER (OUTPATIENT)
Dept: MAMMOGRAPHY | Facility: HOSPITAL | Age: 62
Discharge: HOME | End: 2022-04-05

## 2022-04-05 ENCOUNTER — HOSPITAL ENCOUNTER (OUTPATIENT)
Dept: ULTRASOUND IMAGING | Facility: CLINIC | Age: 62
Discharge: HOME | End: 2022-04-05
Admitting: RADIOLOGY
Payer: COMMERCIAL

## 2022-04-05 ENCOUNTER — HOSPITAL ENCOUNTER (OUTPATIENT)
Dept: MAMMOGRAPHY | Facility: CLINIC | Age: 62
Discharge: HOME | End: 2022-04-05

## 2022-04-05 VITALS — HEART RATE: 84 BPM | DIASTOLIC BLOOD PRESSURE: 98 MMHG | SYSTOLIC BLOOD PRESSURE: 152 MMHG

## 2022-04-05 DIAGNOSIS — R92.8 ABNORMAL MAMMOGRAM: ICD-10-CM

## 2022-04-05 PROCEDURE — 88305 TISSUE EXAM BY PATHOLOGIST: CPT | Performed by: PATHOLOGY

## 2022-04-05 PROCEDURE — A4648 IMPLANTABLE TISSUE MARKER: HCPCS

## 2022-04-05 PROCEDURE — 19083 BX BREAST 1ST LESION US IMAG: CPT

## 2022-04-05 RX ORDER — LIDOCAINE HYDROCHLORIDE 10 MG/ML
5 INJECTION, SOLUTION EPIDURAL; INFILTRATION; INTRACAUDAL; PERINEURAL ONCE
Status: COMPLETED | OUTPATIENT
Start: 2022-04-05 | End: 2022-04-05

## 2022-04-05 RX ORDER — LIDOCAINE HYDROCHLORIDE 10 MG/ML
5 INJECTION, SOLUTION EPIDURAL; INFILTRATION; INTRACAUDAL; PERINEURAL ONCE
Status: CANCELLED | OUTPATIENT
Start: 2022-04-05

## 2022-04-05 RX ADMIN — LIDOCAINE HYDROCHLORIDE 5 ML: 10 INJECTION, SOLUTION EPIDURAL; INFILTRATION; INTRACAUDAL; PERINEURAL at 14:30

## 2022-04-05 NOTE — PROGRESS NOTES
Ice pack given:    __x___yes _____no    Discharge instructions signed by patient:    __x___yes _____no    Discharge instructions given to patient:    __x___yes _____no    Discharged via:    __x___ambulatory    _____wheelchair    _____stretcher    Stable on discharge:    __x___yes ____no  Patient would like results over the phone  Ok to leave a message   m

## 2022-04-05 NOTE — PROGRESS NOTES
Procedure type:    __x___ultrasound guided _____stereotactic    Breast:    _____Left ___x__Right    Location: 10 o'clock 4 cmfn    Needle: 12 gauge leonel     # of passes: 3    Clip: Ribbon    Performed by: Dr Leigh Stahl held for 5 minutes by: Jamila Carias Strips:    __x___yes _____no    Band aid:    ___x__yes_____no    Tape and guaze:    _____yes __x___no    Tolerated procedure:    ___x__yes _____no

## 2022-04-05 NOTE — DISCHARGE INSTR - OTHER ORDERS
POST LARGE CORE BREAST BIOPSY PATIENT INFORMATION      Place an ice pack inside your bra over the top of the dressing every hour for 20 minutes (20 minutes on, 60 minutes off)  Do this until bedtime  Do not shower or bathe until the following morning  After bathing, you may remove the bandaid  You may bathe your breast carefully with the steri-strips in place  Be careful not to loosen them  The steri-strips will fall off in 3-5 days  You may have mild discomfort, and you may have some bruising where the needle entered the skin  This should clear within 5-7 days  If you need medicine for discomfort, take acetaminophen products such as Tylenol  You may also take Advil or Motrin products  DO NOT use Aspirin for the first 24 hours  Do not participate in strenuous activities such as-tennis, aerobics, weight lifting, etc  for 24 hours  Refrain from swimming/soaking for 72 hours  Wearing a bra for sleeping may be more comfortable for the first 24-48 hours after your biopsy  Watch for continued bleeding, pain or fever over 101  If any of these symptoms occur, please contact our breast nurse navigator at the location where your biopsy was performed  During normal business hours (7:30am - 4:00pm) please call the nurse navigator at the site     where your procedure was performed:       [address]: [phone] or [phone] [address]: [phone] or [phone]     [address]: [address] [facility]: [address]: [phone]        After 4pm - please call your physician or go to the nearest Emergency Department location  The final results of your biopsy are usually available within one week

## 2022-04-06 NOTE — PROGRESS NOTES
Post procedure call completed    Bleeding: _____yes __X___no (pt denies)    Pain: _____yes ___X___no (pt with slight discomfort, using ice)    Redness/Swelling: ______yes ___X___no (pt denies)    Band aid removed: _____yes __X___no (will remove when she showers)    Steri-Strips intact: ___X___yes _____no (discussed with patient to remove steri strips on Sunday if they have not come off on their own)    Pt with no questions at this time, adv will call when results available, adv to call with any questions or concerns, has name/# for contact

## 2022-04-08 ENCOUNTER — TELEPHONE (OUTPATIENT)
Dept: MAMMOGRAPHY | Facility: CLINIC | Age: 62
End: 2022-04-08

## 2022-04-12 ENCOUNTER — OFFICE VISIT (OUTPATIENT)
Dept: FAMILY MEDICINE CLINIC | Facility: CLINIC | Age: 62
End: 2022-04-12
Payer: COMMERCIAL

## 2022-04-12 VITALS
OXYGEN SATURATION: 96 % | BODY MASS INDEX: 36.57 KG/M2 | TEMPERATURE: 97.5 F | WEIGHT: 206.4 LBS | HEART RATE: 65 BPM | SYSTOLIC BLOOD PRESSURE: 142 MMHG | HEIGHT: 63 IN | DIASTOLIC BLOOD PRESSURE: 78 MMHG

## 2022-04-12 DIAGNOSIS — H69.83 DYSFUNCTION OF BOTH EUSTACHIAN TUBES: ICD-10-CM

## 2022-04-12 DIAGNOSIS — C85.80 MARGINAL ZONE LYMPHOMA (HCC): ICD-10-CM

## 2022-04-12 DIAGNOSIS — F32.A ANXIETY AND DEPRESSION: ICD-10-CM

## 2022-04-12 DIAGNOSIS — D50.9 IRON DEFICIENCY ANEMIA, UNSPECIFIED IRON DEFICIENCY ANEMIA TYPE: ICD-10-CM

## 2022-04-12 DIAGNOSIS — F41.9 ANXIETY AND DEPRESSION: ICD-10-CM

## 2022-04-12 DIAGNOSIS — H66.92 LEFT OTITIS MEDIA, UNSPECIFIED OTITIS MEDIA TYPE: Primary | ICD-10-CM

## 2022-04-12 DIAGNOSIS — I89.0 LYMPHEDEMA: ICD-10-CM

## 2022-04-12 PROCEDURE — 99214 OFFICE O/P EST MOD 30 MIN: CPT | Performed by: PHYSICIAN ASSISTANT

## 2022-04-12 RX ORDER — FLUTICASONE PROPIONATE 50 MCG
1 SPRAY, SUSPENSION (ML) NASAL DAILY
Qty: 16 G | Refills: 0 | Status: SHIPPED | OUTPATIENT
Start: 2022-04-12

## 2022-04-12 RX ORDER — AMOXICILLIN AND CLAVULANATE POTASSIUM 875; 125 MG/1; MG/1
1 TABLET, FILM COATED ORAL EVERY 12 HOURS SCHEDULED
Qty: 20 TABLET | Refills: 0 | Status: SHIPPED | OUTPATIENT
Start: 2022-04-12 | End: 2022-04-22

## 2022-04-12 NOTE — PROGRESS NOTES
Assessment/Plan:    No problem-specific Assessment & Plan notes found for this encounter  Diagnoses and all orders for this visit:    Left otitis media, unspecified otitis media type  -     Ambulatory Referral to Otolaryngology; Future  -     fluticasone (FLONASE) 50 mcg/act nasal spray; 1 spray into each nostril daily  -     amoxicillin-clavulanate (AUGMENTIN) 875-125 mg per tablet; Take 1 tablet by mouth every 12 (twelve) hours for 10 days    Dysfunction of both eustachian tubes  -     Ambulatory Referral to Otolaryngology; Future    Iron deficiency anemia, unspecified iron deficiency anemia type    Marginal zone lymphoma (HCC)    Anxiety and depression  -     sertraline (Zoloft) 50 mg tablet; Take 1 tablet (50 mg total) by mouth daily at bedtime    Lymphedema          Subjective:      Patient ID: Jareth Valentino is a 63 y o  female  Patient presents today for follow up on multiple complaints  Was recently seen by hematology in January of this year and diagnosed with marginal zone lymphoma  She has hx iron deficiency and recently completed 6 sessions of iron infusions  She was feeling improved for a bit but has since started to feel the fatigue and lightheadedness again  Pt is to follow up with hematology oncology this month  She is struggling with her anxiety and depression with trying to cope with her chronic medical conditions and how to discuss with family  Having much difficulty sleeping   Has tried trazadone without relief  She does lymphedema therapy at home with some relief or her leg swelling  Patient has not worked since 10/2020 since onset of chronic medical conditions     C/o night sweats and chills   Does not note any recent change in weight or fevers      The following portions of the patient's history were reviewed and updated as appropriate: allergies, current medications, past family history, past medical history, past social history and problem list     Review of Systems Constitutional: Positive for chills, diaphoresis and fatigue  Negative for fever  HENT: Negative for congestion, ear pain, sinus pain, sore throat and trouble swallowing  Eyes: Negative for pain, discharge and redness  Respiratory: Negative for cough, chest tightness, shortness of breath and wheezing  Cardiovascular: Positive for leg swelling  Negative for chest pain and palpitations  Gastrointestinal: Negative for abdominal pain, diarrhea, nausea and vomiting  Musculoskeletal: Negative for arthralgias, joint swelling and myalgias  Skin: Negative for rash  Neurological: Negative for dizziness, weakness, numbness and headaches  Psychiatric/Behavioral: Positive for dysphoric mood and sleep disturbance  The patient is nervous/anxious  Objective:      /78   Pulse 65   Temp 97 5 °F (36 4 °C)   Ht 5' 3" (1 6 m)   Wt 93 6 kg (206 lb 6 4 oz)   SpO2 96%   BMI 36 56 kg/m²          Physical Exam  Vitals and nursing note reviewed  Constitutional:       General: She is not in acute distress  Appearance: Normal appearance  She is well-developed  Cardiovascular:      Rate and Rhythm: Normal rate and regular rhythm  Pulmonary:      Effort: Pulmonary effort is normal       Breath sounds: Normal breath sounds  Abdominal:      General: Bowel sounds are normal       Palpations: Abdomen is soft  Abdomen is not rigid  Tenderness: There is no abdominal tenderness  There is no guarding or rebound  Negative signs include Rodríguez's sign and McBurney's sign  Hernia: No hernia is present  Skin:     General: Skin is warm and dry  Psychiatric:         Attention and Perception: Attention normal          Mood and Affect: Mood is anxious  Affect is tearful  Speech: Speech normal          Behavior: Behavior normal          Thought Content:  Thought content normal          Cognition and Memory: Cognition normal          Judgment: Judgment normal

## 2022-04-18 ENCOUNTER — APPOINTMENT (OUTPATIENT)
Dept: LAB | Facility: CLINIC | Age: 62
End: 2022-04-18
Payer: COMMERCIAL

## 2022-04-18 LAB
ALBUMIN SERPL BCP-MCNC: 3.8 G/DL (ref 3.5–5)
ALP SERPL-CCNC: 101 U/L (ref 46–116)
ALT SERPL W P-5'-P-CCNC: 24 U/L (ref 12–78)
ANION GAP SERPL CALCULATED.3IONS-SCNC: 6 MMOL/L (ref 4–13)
AST SERPL W P-5'-P-CCNC: 19 U/L (ref 5–45)
BASOPHILS # BLD AUTO: 0.11 THOUSANDS/ΜL (ref 0–0.1)
BASOPHILS NFR BLD AUTO: 1 % (ref 0–1)
BILIRUB SERPL-MCNC: 1.2 MG/DL (ref 0.2–1)
BUN SERPL-MCNC: 18 MG/DL (ref 5–25)
CALCIUM SERPL-MCNC: 9.7 MG/DL (ref 8.3–10.1)
CHLORIDE SERPL-SCNC: 107 MMOL/L (ref 100–108)
CO2 SERPL-SCNC: 25 MMOL/L (ref 21–32)
CREAT SERPL-MCNC: 0.82 MG/DL (ref 0.6–1.3)
EOSINOPHIL # BLD AUTO: 0.18 THOUSAND/ΜL (ref 0–0.61)
EOSINOPHIL NFR BLD AUTO: 1 % (ref 0–6)
ERYTHROCYTE [DISTWIDTH] IN BLOOD BY AUTOMATED COUNT: 14.8 % (ref 11.6–15.1)
FERRITIN SERPL-MCNC: 61 NG/ML (ref 8–388)
GFR SERPL CREATININE-BSD FRML MDRD: 78 ML/MIN/1.73SQ M
GLUCOSE P FAST SERPL-MCNC: 98 MG/DL (ref 65–99)
HBV CORE AB SER QL: NORMAL
HBV CORE IGM SER QL: NORMAL
HBV SURFACE AG SER QL: NORMAL
HCT VFR BLD AUTO: 41.2 % (ref 34.8–46.1)
HCV AB SER QL: NORMAL
HGB BLD-MCNC: 13.4 G/DL (ref 11.5–15.4)
IMM GRANULOCYTES # BLD AUTO: 0.05 THOUSAND/UL (ref 0–0.2)
IMM GRANULOCYTES NFR BLD AUTO: 0 % (ref 0–2)
IRON SATN MFR SERPL: 21 % (ref 15–50)
IRON SERPL-MCNC: 74 UG/DL (ref 50–170)
LDH SERPL-CCNC: 225 U/L (ref 81–234)
LYMPHOCYTES # BLD AUTO: 5.99 THOUSANDS/ΜL (ref 0.6–4.47)
LYMPHOCYTES NFR BLD AUTO: 46 % (ref 14–44)
MCH RBC QN AUTO: 27.1 PG (ref 26.8–34.3)
MCHC RBC AUTO-ENTMCNC: 32.5 G/DL (ref 31.4–37.4)
MCV RBC AUTO: 83 FL (ref 82–98)
MONOCYTES # BLD AUTO: 0.66 THOUSAND/ΜL (ref 0.17–1.22)
MONOCYTES NFR BLD AUTO: 5 % (ref 4–12)
NEUTROPHILS # BLD AUTO: 6.05 THOUSANDS/ΜL (ref 1.85–7.62)
NEUTS SEG NFR BLD AUTO: 47 % (ref 43–75)
NRBC BLD AUTO-RTO: 0 /100 WBCS
PLATELET # BLD AUTO: 213 THOUSANDS/UL (ref 149–390)
PMV BLD AUTO: 13.8 FL (ref 8.9–12.7)
POTASSIUM SERPL-SCNC: 4.3 MMOL/L (ref 3.5–5.3)
PROT SERPL-MCNC: 7.4 G/DL (ref 6.4–8.2)
RBC # BLD AUTO: 4.94 MILLION/UL (ref 3.81–5.12)
SODIUM SERPL-SCNC: 138 MMOL/L (ref 136–145)
TIBC SERPL-MCNC: 351 UG/DL (ref 250–450)
WBC # BLD AUTO: 13.04 THOUSAND/UL (ref 4.31–10.16)

## 2022-04-18 PROCEDURE — 85025 COMPLETE CBC W/AUTO DIFF WBC: CPT

## 2022-04-18 PROCEDURE — 86704 HEP B CORE ANTIBODY TOTAL: CPT

## 2022-04-18 PROCEDURE — 83615 LACTATE (LD) (LDH) ENZYME: CPT

## 2022-04-18 PROCEDURE — 36415 COLL VENOUS BLD VENIPUNCTURE: CPT

## 2022-04-18 PROCEDURE — 86705 HEP B CORE ANTIBODY IGM: CPT

## 2022-04-18 PROCEDURE — 83540 ASSAY OF IRON: CPT

## 2022-04-18 PROCEDURE — 83550 IRON BINDING TEST: CPT

## 2022-04-18 PROCEDURE — 82728 ASSAY OF FERRITIN: CPT

## 2022-04-18 PROCEDURE — 80053 COMPREHEN METABOLIC PANEL: CPT

## 2022-04-18 PROCEDURE — 86803 HEPATITIS C AB TEST: CPT

## 2022-04-18 PROCEDURE — 87340 HEPATITIS B SURFACE AG IA: CPT

## 2022-04-21 ENCOUNTER — VBI (OUTPATIENT)
Dept: ADMINISTRATIVE | Facility: OTHER | Age: 62
End: 2022-04-21

## 2022-04-27 ENCOUNTER — OFFICE VISIT (OUTPATIENT)
Dept: HEMATOLOGY ONCOLOGY | Facility: CLINIC | Age: 62
End: 2022-04-27
Payer: COMMERCIAL

## 2022-04-27 VITALS
SYSTOLIC BLOOD PRESSURE: 138 MMHG | RESPIRATION RATE: 16 BRPM | DIASTOLIC BLOOD PRESSURE: 80 MMHG | BODY MASS INDEX: 36.86 KG/M2 | OXYGEN SATURATION: 98 % | HEIGHT: 63 IN | WEIGHT: 208 LBS | TEMPERATURE: 97.6 F | HEART RATE: 71 BPM

## 2022-04-27 DIAGNOSIS — C85.80 MARGINAL ZONE LYMPHOMA (HCC): Primary | ICD-10-CM

## 2022-04-27 DIAGNOSIS — Z86.2 HISTORY OF IRON DEFICIENCY ANEMIA: ICD-10-CM

## 2022-04-27 PROBLEM — D50.9 IRON DEFICIENCY ANEMIA: Status: RESOLVED | Noted: 2021-11-30 | Resolved: 2022-04-27

## 2022-04-27 PROCEDURE — 99214 OFFICE O/P EST MOD 30 MIN: CPT | Performed by: INTERNAL MEDICINE

## 2022-04-27 NOTE — PROGRESS NOTES
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: Final Diagnosis   A, B & C  Bone marrow, right iliac, core needle biopsy, aspirate clot section & aspirate:  - Low grade B-cell lymphoma, nonspecific immunophenotype, infiltrating marrow in an interstitial & nodular pattern, comprising 30-35% of cells of an hypercellular for age (65-70%) marrow - see Note         Note    Immunohistochemical stains & colorimetric RNA in situ hybridization (CISH), evaluated with appropriate positive & negative controls, are employed in the enumeration of marrow B-lymphocytes (CD20), T-lymphocytes (CD3) & plasma cells (, kappa-CISH and lambda-CISH)  Results of these studies are given in the final diagnosis and reveal low grade B-cell lymphoma, nonspecific immunophenotype  The differential diagnosis would include (but is not limited to) marginal zone lymphoma (MZL) in leukemic phase (greg, splenic and MALT type) and lymphoplasmacytic lymphoma  SUBJECTIVE      Initial Visit  [facility] is a 63 y o  here for new consultation with me today  The patient is referred by Eben Rosenthal and the reason for consultation is anemia      Her CBC is remarkable for leukocytosis and microcytic anemia with stable platelet count:   8/3/2000  10/23/2021  11/5/2021  11/30/2021    WBC 14 05 (H) 13 76 (H) 14 66 (H) 10 57 (H)   Hemoglobin 12 8 8 5 (L) 8 8 (L) 8 9 (L)   HCT 41 8 30 9 (L) 31 4 (L) 32 4 (L) MCV 82 70 (L) 69 (L) 69 (L)   Platelet Count 803 575 291 259     Her differential is remarkable for increased lymphocytes and basophils:   5/3/2017  10/23/2021  11/5/2021    Immature Grans Absolute  0 06 0 04   Absolute Neutrophils 7 54 6 98 7 55   Lymphocytes Absolute 5 23 (H) 5 76 (H) 5 77 (H)   Absolute Monocytes 0 96 0 70 0 91   Absolute Eosinophils 0 16 0 15 0 23   Basophils Absolute 0 12 (H) 0 11 (H) 0 16 (H)     Her iron panel shows significant iron deficiency:   11/5/2021 11/30/2021    Iron 26 (L) 20 (L)   Ferritin 4 (L) 9   Iron Saturation 5 (L) 5 (L)   TIBC 502 (H) 442      11/30/2021    Folate 15 7   Vitamin B-12 1,139 (H)       During her initial visit, she said that she has been anemic all her life, but it has never been investigated before  She had a hysterectomy for fibroids and secondary menorrhagia 8-9 years ago  She denied any abnormal vaginal bleeding since then  She is due for a colonoscopy and will schedule one after the visit  She denied risk factors for HIV or hepatitis  This Visit    Denies any f/c/n/v/cp/ap/sob/cough  Denies diarrhea  No b-symptoms  I have reviewed the relevant past medical, surgical, social and family history  I have also reviewed allergies and medications for this patient  Review of Systems  Review of Systems   All other systems reviewed and are negative  OBJECTIVE     Physical Exam  Vitals:    04/27/22 0846   BP: 138/80   BP Location: Left arm   Patient Position: Sitting   Cuff Size: Adult   Pulse: 71   Resp: 16   Temp: 97 6 °F (36 4 °C)   SpO2: 98%   Weight: 94 3 kg (208 lb)   Height: 5' 3" (1 6 m)     Physical Exam  Vitals reviewed  Constitutional:       General: She is not in acute distress  Appearance: She is not toxic-appearing  HENT:      Head: Atraumatic  Eyes:      General: No scleral icterus  Conjunctiva/sclera: Conjunctivae normal    Cardiovascular:      Rate and Rhythm: Normal rate     Pulmonary:      Effort: Pulmonary effort is normal    Abdominal:      General: There is no distension  Musculoskeletal:         General: No swelling  Normal range of motion  Cervical back: Normal range of motion  Lymphadenopathy:      Cervical: No cervical adenopathy  Neurological:      Mental Status: She is alert  Imaging  Relevant imaging reviewed in chart    Labs  Relevant labs reviewed in chart     ASSESSMENT & PLAN      Diagnosis ICD-10-CM Associated Orders   1  Marginal zone lymphoma (HCC)  C85 80 CBC and differential     Comprehensive metabolic panel     LD,Blood   2  History of iron deficiency anemia  Z86 2          63 y o  Female seen in consultation for iron deficiency anemia and unexplained leukocytosis  Further work-up showed diagnosis of Marginal Zone Lymphoma (MZL) from bone marrow biopsy with trisomy 12 detection  Discussion/Plan  For ESTELA - since completion of venofer therapy, repeat labs for assessment show resolution  For MZL - given no treatment criteria are being met, recommend surveillance with labs upon return visit  I provided education about her cancer diagnosis and reviewed the marrow pathology with her  Of importance, her MYD88 testing was negative  All questions were answered to the patient's satisfaction during this encounter  They appreciated and thanked me for spending time with them  The patient knows the contact information for our office and know to reach out for any relevant concerns related to this encounter  For all other listed problems and medical diagnosis in his chart - they are managed by PCP and/or other specialists, which patient acknowledges        Dr Jailene Lowe

## 2022-05-16 ENCOUNTER — OFFICE VISIT (OUTPATIENT)
Dept: FAMILY MEDICINE CLINIC | Facility: CLINIC | Age: 62
End: 2022-05-16
Payer: COMMERCIAL

## 2022-05-16 VITALS
DIASTOLIC BLOOD PRESSURE: 80 MMHG | BODY MASS INDEX: 36.68 KG/M2 | HEIGHT: 63 IN | WEIGHT: 207 LBS | OXYGEN SATURATION: 98 % | SYSTOLIC BLOOD PRESSURE: 110 MMHG | TEMPERATURE: 97.5 F | HEART RATE: 72 BPM

## 2022-05-16 DIAGNOSIS — H66.93 ACUTE OTITIS MEDIA, BILATERAL: ICD-10-CM

## 2022-05-16 DIAGNOSIS — F32.A ANXIETY AND DEPRESSION: Primary | ICD-10-CM

## 2022-05-16 DIAGNOSIS — F41.9 ANXIETY AND DEPRESSION: Primary | ICD-10-CM

## 2022-05-16 PROCEDURE — 99214 OFFICE O/P EST MOD 30 MIN: CPT | Performed by: PHYSICIAN ASSISTANT

## 2022-05-16 RX ORDER — PREDNISONE 20 MG/1
40 TABLET ORAL DAILY
Qty: 10 TABLET | Refills: 0 | Status: SHIPPED | OUTPATIENT
Start: 2022-05-16 | End: 2022-05-21

## 2022-05-16 RX ORDER — CEFDINIR 300 MG/1
300 CAPSULE ORAL EVERY 12 HOURS SCHEDULED
Qty: 20 CAPSULE | Refills: 0 | Status: SHIPPED | OUTPATIENT
Start: 2022-05-16 | End: 2022-05-26

## 2022-05-16 NOTE — PROGRESS NOTES
Assessment/Plan:    No problem-specific Assessment & Plan notes found for this encounter  Diagnoses and all orders for this visit:    Anxiety and depression    Acute otitis media, bilateral  -     predniSONE 20 mg tablet; Take 2 tablets (40 mg total) by mouth in the morning for 5 days  -     cefdinir (OMNICEF) 300 mg capsule; Take 1 capsule (300 mg total) by mouth every 12 (twelve) hours for 10 days  -     Ambulatory Referral to Otolaryngology; Future      Cut zoloft in half to 25mg qhs due to waking up with excessive sleepiness    Subjective:      Patient ID: Lakeisha Marrufo is a 63 y o  female  Patient presents for evaluation of tinnitus, ear fullness, dizziness and persistent ear pain  Patient continues with left ear pain - finished course of Augmentin   Has not had any fevers  Has had persistent ear ringing in left ear      The following portions of the patient's history were reviewed and updated as appropriate: allergies, current medications, past medical history, past social history, past surgical history and problem list     Review of Systems   Constitutional: Negative for chills, fatigue and fever  HENT: Positive for ear pain and tinnitus  Negative for congestion, sinus pain, sore throat and trouble swallowing  Eyes: Negative for pain, discharge and redness  Respiratory: Negative for cough, chest tightness, shortness of breath and wheezing  Cardiovascular: Negative for chest pain, palpitations and leg swelling  Gastrointestinal: Negative for abdominal pain, diarrhea, nausea and vomiting  Musculoskeletal: Negative for arthralgias, joint swelling and myalgias  Skin: Negative for rash  Neurological: Positive for dizziness and headaches  Negative for weakness and numbness           Objective:      /80 (BP Location: Left arm, Patient Position: Sitting, Cuff Size: Large)   Pulse 72   Temp 97 5 °F (36 4 °C)   Ht 5' 3" (1 6 m)   Wt 93 9 kg (207 lb)   SpO2 98%   BMI 36 67 kg/m²          Physical Exam  Vitals and nursing note reviewed  Constitutional:       Appearance: She is well-developed  HENT:      Head: Normocephalic  Right Ear: Hearing normal  A middle ear effusion is present  Left Ear: Hearing normal  A middle ear effusion is present  Nose: No mucosal edema  Mouth/Throat:      Pharynx: Uvula midline  Posterior oropharyngeal erythema present  Cardiovascular:      Rate and Rhythm: Normal rate and regular rhythm  Pulmonary:      Effort: Pulmonary effort is normal       Breath sounds: Normal breath sounds

## 2022-05-17 PROBLEM — S82.62XA CLOSED DISPLACED FRACTURE OF LATERAL MALLEOLUS OF LEFT FIBULA: Status: ACTIVE | Noted: 2017-01-26

## 2022-05-17 PROBLEM — R51.9 HEADACHE: Status: ACTIVE | Noted: 2017-05-03

## 2022-05-17 PROBLEM — S82.52XA CLOSED FRACTURE OF MEDIAL MALLEOLUS OF LEFT TIBIA: Status: ACTIVE | Noted: 2017-01-26

## 2022-05-17 PROBLEM — I71.21 ASCENDING AORTIC ANEURYSM: Status: ACTIVE | Noted: 2017-01-26

## 2022-05-17 PROBLEM — F07.81 POST CONCUSSION SYNDROME: Status: ACTIVE | Noted: 2017-03-16

## 2022-05-17 PROBLEM — S06.0X9A CONCUSSION WITH BRIEF (LESS THAN ONE HOUR) LOSS OF CONSCIOUSNESS: Status: ACTIVE | Noted: 2017-01-26

## 2022-05-17 PROBLEM — N28.89 LEFT RENAL MASS: Status: ACTIVE | Noted: 2017-01-26

## 2022-05-17 PROBLEM — S12.9XXD: Status: ACTIVE | Noted: 2017-01-26

## 2022-05-17 PROBLEM — M21.40 ACQUIRED PES PLANUS: Status: ACTIVE | Noted: 2017-12-06

## 2022-05-17 PROBLEM — H53.9 VISUAL DISTURBANCE: Status: ACTIVE | Noted: 2017-05-03

## 2022-05-17 PROBLEM — M19.90 ARTHRITIS: Status: ACTIVE | Noted: 2017-05-03

## 2022-05-17 PROBLEM — R12 HEARTBURN: Status: ACTIVE | Noted: 2017-05-03

## 2022-05-17 PROBLEM — H46.9 OPTIC NEUROPATHY, LEFT: Status: ACTIVE | Noted: 2017-05-03

## 2022-05-17 PROBLEM — I71.2 ASCENDING AORTIC ANEURYSM (HCC): Status: ACTIVE | Noted: 2017-01-26

## 2022-05-17 PROBLEM — H33.22 DETACHED RETINA, LEFT: Status: ACTIVE | Noted: 2017-01-26

## 2022-05-17 NOTE — PROGRESS NOTES
Patient is here today for a gynecological annual exam    No questions or concerns today  LMP: Hysterectomy      Last pap: Not on file  Hx of abnormal paps? NO   Last Mammo: 3/23/2022  Last Colonoscopy: 2021  Next due   UTD with Covid vaccine

## 2022-05-17 NOTE — PROGRESS NOTES
Diagnoses and all orders for this visit:    Encounter for annual routine gynecological examination      A pap smear was not performed  Perineal hygiene reviewed  Vaginal irritation likely due to vaginal atrophy  Coconut oil recommended for vaginal dryness and irritation  RTO if worsening symptoms or new vaginal itching, discharge, or odor  Discussed OTC herbal supplements for hot flashes  Advised she consult with her hematologist prior to taking any new OTC medications or supplements  SBE, daily exercise and healthy diet with adequate calcium and vitamin D encouraged  Weight bearing exercises a minimum of 150 minutes/week advised  Yearly mammograms advised  Stay current with all recommended health and wellness screenings  Advised to call with any issues, all concerns & questions addressed  See provided information in your after visit summary     F/U annually or Biannual if Medicare     Results will be released to [employer], if abnormal will call or message to review and discuss treatment plan  Health Maintenance:    Last Mammogram: 03/23/2022  Results were: Positive for right breast mass at 10 o'clock 4 cm from the nipple  S/p biopsy - negative for malignancy  Breast center advised return to routine screening  Next Mammogram: 2023    Last Colonoscopy: 01/10/2022      Subjective    CC: Yearly Exam     Montserrat Fall is a 63 y o  postmenopausal female here for annual exam  DULCE MARIA [place]jo hx includes:  No personal Hx of breast, cervical, ovarian or colon CA  S/p hysterectomy 2005 for menorrhagia and endometrial polyps  Patient unsure if she still has a cervix or ovaries  She reports not having any pap smears performed after hysterectomy  She reports hot flashes  She is unsure if this is due to menopause or her lymphoma  She also reports some vaginal irritation  She denies vaginal discharge, bleeding, itching, or odor   Denies breast concerns, pelvic pain, urinary symptoms, symptoms of pelvic organ prolapse, urinary, or fecal incontinence today  She is not sexually active  Denies intimate partner violence  STD testing:  She does not want STD testing today  Past Medical History:   Diagnosis Date    Lymphoma ([address] ) 02/2022    Right trigger finger      Past Surgical History:   Procedure Laterality Date    ANKLE HARDWARE REMOVAL      CHOLECYSTECTOMY      HYSTERECTOMY      IR BIOPSY BONE MARROW  1/12/2022    US GUIDED BREAST BIOPSY RIGHT COMPLETE Right 4/5/2022      Family History   Problem Relation Age of Onset    Hypertension Mother     Diabetes Brother     Breast cancer Maternal Grandmother 61    Colon cancer Neg Hx     Ovarian cancer Neg Hx     Uterine cancer Neg Hx     Cervical cancer Neg Hx      Social History     Tobacco Use    Smoking status: Never Smoker    Smokeless tobacco: Never Used   Vaping Use    Vaping Use: Never used   Substance Use Topics    Alcohol use: Not Currently    Drug use: Never       Current Outpatient Medications:     Ascorbic Acid (vitamin C) 1000 MG tablet, Take 1 tablet by mouth daily, Disp: , Rfl:     cefdinir (OMNICEF) 300 mg capsule, Take 1 capsule (300 mg total) by mouth every 12 (twelve) hours for 10 days, Disp: 20 capsule, Rfl: 0    ergocalciferol (VITAMIN D2) 50,000 units, Take 1 capsule (50,000 Units total) by mouth once a week, Disp: 12 capsule, Rfl: 0    fluticasone (FLONASE) 50 mcg/act nasal spray, 1 spray into each nostril daily, Disp: 16 g, Rfl: 0    Omeprazole 20 MG TBDD, Take 1 tablet by mouth daily, Disp: , Rfl:     predniSONE 20 mg tablet, Take 2 tablets (40 mg total) by mouth in the morning for 5 days  , Disp: 10 tablet, Rfl: 0    sertraline (Zoloft) 50 mg tablet, Take 1 tablet (50 mg total) by mouth daily at bedtime, Disp: 30 tablet, Rfl: 2    VITAMIN D PO, Take by mouth  , Disp: , Rfl:   Patient Active Problem List    Diagnosis Date Noted    Anxiety and depression 05/16/2022    History of iron deficiency anemia 2022    Marginal zone lymphoma ([facility] [address] ) 2022    Gastroesophageal reflux disease 01/10/2022    Acquired pes planus 2017    Arthritis 2017    Headache 2017    Heartburn 2017    Optic neuropathy, left 2017    Visual disturbance 2017    Post concussion syndrome 2017    Ascending aortic aneurysm (HCC) 2017    Cervical transverse process fracture, subsequent encounter 2017    Closed displaced fracture of lateral malleolus of left fibula 2017    Closed fracture of medial malleolus of left tibia 2017    Concussion with brief (less than one hour) loss of consciousness 2017    Detached retina, left 2017    Left renal mass 2017       Allergies   Allergen Reactions    Benadryl [Diphenhydramine] Anaphylaxis    Erythromycin Hives       OB History    Para Term  AB Living   4 4 4     4   SAB IAB Ectopic Multiple Live Births                  # Outcome Date GA Lbr Everette/2nd Weight Sex Delivery Anes PTL Lv   4 Term            3 Term            2 Term            1 Term                Vitals:    22 0835   BP: 108/70   BP Location: Right arm   Patient Position: Sitting   Cuff Size: Standard   Weight: 94 8 kg (209 lb)   Height: 5' 3" (1 6 m)     Body mass index is 37 02 kg/m²  Review of Systems   Constitutional: Negative for chills, fatigue, fever and unexpected weight change  Respiratory: Negative for cough and shortness of breath  Cardiovascular: Negative for chest pain, palpitations and leg swelling  Gastrointestinal: Negative for abdominal distention, abdominal pain, blood in stool, constipation, diarrhea and nausea  Endocrine: Negative for cold intolerance and heat intolerance         + hot flashes   Genitourinary: Negative for difficulty urinating, dyspareunia, dysuria, frequency, hematuria, menstrual problem, pelvic pain, urgency, vaginal bleeding, vaginal discharge and vaginal pain          + vaginal irritation   Musculoskeletal: Negative for back pain  Skin: Negative for rash  Neurological: Negative for headaches  Psychiatric/Behavioral: Negative for confusion and dysphoric mood  All other systems reviewed and are negative  Physical Exam  Constitutional:       General: She is not in acute distress  Appearance: Normal appearance  She is not ill-appearing  Genitourinary:      Urethral meatus normal       No lesions in the vagina  Right Labia: No rash, tenderness, lesions or skin changes  Left Labia: No tenderness, lesions, skin changes or rash  Vaginal cuff intact  No vaginal discharge, erythema, tenderness, bleeding, ulceration or granulation tissue  No vaginal prolapse present  Mild vaginal atrophy present  Right Adnexa: not palpable  Left Adnexa: not palpable  Cervix is absent  Uterus is absent  No urethral tenderness, mass or discharge present  Breasts:      Right: No swelling, inverted nipple, mass, nipple discharge, skin change, tenderness, axillary adenopathy or supraclavicular adenopathy  Left: No swelling, inverted nipple, mass, nipple discharge, skin change, tenderness, axillary adenopathy or supraclavicular adenopathy  HENT:      Head: Normocephalic and atraumatic  Eyes:      Conjunctiva/sclera: Conjunctivae normal    Cardiovascular:      Rate and Rhythm: Normal rate and regular rhythm  Pulmonary:      Effort: Pulmonary effort is normal       Breath sounds: Normal breath sounds  Abdominal:      General: There is no distension  Palpations: Abdomen is soft  Tenderness: There is no abdominal tenderness  Musculoskeletal:         General: Normal range of motion  Cervical back: Normal range of motion and neck supple  Lymphadenopathy:      Upper Body:      Right upper body: No supraclavicular or axillary adenopathy  Left upper body: No supraclavicular or axillary adenopathy  Neurological:      Mental Status: She is alert and oriented to person, place, and time  Skin:     General: Skin is warm and dry  Psychiatric:         Mood and Affect: Mood normal          Behavior: Behavior normal    Vitals and nursing note reviewed

## 2022-05-19 ENCOUNTER — OFFICE VISIT (OUTPATIENT)
Dept: OBGYN CLINIC | Age: 62
End: 2022-05-19
Payer: COMMERCIAL

## 2022-05-19 VITALS
DIASTOLIC BLOOD PRESSURE: 70 MMHG | WEIGHT: 209 LBS | SYSTOLIC BLOOD PRESSURE: 108 MMHG | BODY MASS INDEX: 37.03 KG/M2 | HEIGHT: 63 IN

## 2022-05-19 DIAGNOSIS — Z01.419 ENCOUNTER FOR ANNUAL ROUTINE GYNECOLOGICAL EXAMINATION: Primary | ICD-10-CM

## 2022-05-19 PROCEDURE — 99386 PREV VISIT NEW AGE 40-64: CPT

## 2022-05-19 PROCEDURE — 3008F BODY MASS INDEX DOCD: CPT | Performed by: PHYSICIAN ASSISTANT

## 2022-05-19 NOTE — PATIENT INSTRUCTIONS
For vaginal dryness: You may use:     Coconut oil (organic, pure, unscented) as needed for moisture or lubrication  ( Do not use if allergic)       Replens moisture restore external comfort gel daily ( use as directed on the box)        Replens long lasting vaginal moisturizer  ( use as directed on the box)       For Vaginal Lubrication:        You may use:     Coconut oil (organic, pure, unscented) as needed for lubrication during intercourse  (Do not use if allergic)               Replens silky smooth lubricant, premium silicone based lubricant for intercourse  ( use as directed, a small amount will provide an enhanced natural feeling)     Any premium over the counter vaginal lubricant water or silicone based  Silicone based will have more staying power  For Vulvar hygiene:     No soaps or feminine wash to the vulva with the exception of Dove or Dove Sensitive Skin bar soap if necessary  Only perfume-free, dye-free laundry detergent, use a second rinse cycle  Avoid fabric softeners/dryer sheets  No lotion to the area  No Douching   Coconut oil as a lubricant (if not using condoms) or another scent-free premium lubricant  Loose fitting cotton underwear and loose fitting outer clothing   Partner to avoid the same products as well  Over the counter probiotic taken orally may help to restore vaginal zack  Breast Self Exam for Women   AMBULATORY CARE:   A breast self-exam (BSE)  is a way to check your breasts for lumps and other changes  Regular BSEs can help you know how your breasts normally look and feel  Most breast lumps or changes are not cancer, but you should always have them checked by a healthcare provider  Why you should do a BSE:  Breast cancer is the most common type of cancer in women  Even if you have mammograms, you may still want to do a BSE regularly  If you know how your breasts normally feel and look, it may help you know when to contact your healthcare provider  Mammograms can miss some cancers  You may find a lump during a BSE that did not show up on a mammogram   When you should do a BSE:  If you have periods, you may want to do your BSE 1 week after your period ends  This is the time when your breasts may be the least swollen, lumpy, or tender  You can do regular BSEs even if you are breastfeeding or have breast implants  Call your doctor if:   You find any lumps or changes in your breasts  You have breast pain or fluid coming from your nipples  You have questions or concerns about your condition or care  How to do a BSE:       Look at your breasts in a mirror  Look at the size and shape of each breast and nipple  Check for swelling, lumps, dimpling, scaly skin, or other skin changes  Look for nipple changes, such as a nipple that is painful or beginning to pull inward  Gently squeeze both nipples and check to see if fluid (that is not breast milk) comes out of them  If you find any of these or other breast changes, contact your healthcare provider  Check your breasts while you sit or  the following 3 positions:    Sumeet Stone your arms down at your sides  Raise your hands and join them behind your head  Put firm pressure with your hands on your hips  Bend slightly forward while you look at your breasts in the mirror  Lie down and feel your breasts  When you lie down, your breast tissue spreads out evenly over your chest  This makes it easier for you to feel for lumps and anything that may not be normal for your breasts  Do a BSE on one breast at a time  Place a small pillow or towel under your left shoulder  Put your left arm behind your head  Use the 3 middle fingers of your right hand  Use your fingertip pads, on the top of your fingers  Your fingertip pad is the most sensitive part of your finger  Use small circles to feel your breast tissue  Use your fingertip pads to make dime-sized, overlapping circles on your breast and armpits  Use light, medium, and firm pressure  First, press lightly  Second, press with medium pressure to feel a little deeper into the breast  Last, use firm pressure to feel deep within your breast     Examine your entire breast area  Examine the breast area from above the breast to below the breast where you feel only ribs  Make small circles with your fingertips, starting in the middle of your armpit  Make circles going up and down the breast area  Continue toward your breast and all the way across it  Examine the area from your armpit all the way over to the middle of your chest (breastbone)  Stop at the middle of your chest     Move the pillow or towel to your right shoulder, and put your right arm behind your head  Use the 3 fingertip pads of your left hand, and repeat the above steps to do a BSE on your right breast     What else you can do to check for breast problems or cancer:  Talk to your healthcare provider about mammograms  A mammogram is an x-ray of your breasts to screen for breast cancer or other problems  Your provider can tell you the benefits and risks of mammograms  The first mammogram is usually at age 39 or 48  Your provider may recommend you start at 36 or younger if your risk for breast cancer is high  Mammograms usually continue every 1 to 2 years until age 76  Follow up with your doctor as directed:  Write down your questions so you remember to ask them during your visits  © Copyright [employer] 2022 Information is for End User's use only and may not be sold, redistributed or otherwise used for commercial purposes  All illustrations and images included in CareNotes® are the copyrighted property of [employer] D A M [employer] Inc  or [employer]  The above information is an  only  It is not intended as medical advice for individual conditions or treatments   Talk to your doctor, nurse or pharmacist before following any medical regimen to see if it is safe and effective for you   Menopause   WHAT YOU NEED TO KNOW:   What is menopause? Menopause is a normal stage in a woman's life when her monthly periods stop  You are considered to be in menopause when you have not had a period for a full year after the age of 36  Menopause usually occurs between ages 52 to 48  Perimenopause is a stage before menopause that may cause signs and symptoms similar to menopause  Perimenopause may start about 4 years before menopause  What causes menopause? Menopause starts when the ovaries stop making the female hormones estrogen and progesterone  After menopause, you are no longer able to become pregnant  Any of the following may trigger menopause or early menopause:  Surgery, including a hysterectomy or oophorectomy    Family history of early menopause    Smoking    Chemotherapy or pelvic radiation    Chromosome abnormalities, including Blum syndrome and Fragile X syndrome    Premature ovarian insufficiency (the ovaries stop producing eggs before age 36)    What are the signs and symptoms of menopause? You may have any of the following:  Irregular menstrual cycles with heavy vaginal bleeding followed by decreased bleeding until it stops    Hot flashes (feeling warm, flushed, and sweaty)    Vaginal changes such as increased dryness    Mood changes such as anxiety, depression, or decreased desire to have sex    Trouble sleeping, joint pain, headaches    Brittle nails, hair on chin or chest where it is normally absent    Decrease in breast size and change in skin texture    Weight gain    How is menopause treated or managed? Hormone replacement therapy (HRT) is medicine that replaces your low hormone levels  HRT contains estrogen and sometimes progestin  HRT has several benefits  HRT helps prevent osteoporosis, which decreases your risk for bone fractures  HRT also protects you from colorectal cancer  HRT also has some risks    HRT increases your risk for breast cancer, blood clots, heart disease, a heart attack, or a stroke  If you are 72 years or older, HRT can also increase your risk for dementia  Your risk for uterine or endometrial cancer is higher if you take estrogen-only HRT  Manage hot flashes  Hot flashes are brief periods of feeling very warm, flushed, and sweaty  Hot flashes can last from a few seconds to several minutes  They may happen many times during the day, and are common at night  Layer your clothing so that you can easily remove some clothing and cool yourself during a hot flash  Cold drinks may also be helpful  Non-hormone medicines can help relieve or prevent hot flashes  Examples include certain antidepressants, nerve medicines, and high blood pressure medicines  Reduce vaginal dryness by using over-the-counter vaginal creams  Vaginal dryness may cause you to have pain or discomfort during sex  Only use creams that are made for vaginal use  Do  not  use petroleum jelly  You may put an estrogen cream in and around your vagina  Estrogen cream may help decrease vaginal dryness and lower your risk of vaginal infections  Continue to use birth control during perimenopause if you do not want to get pregnant  You may need to use birth control until it has been 1 year since your periods stopped  Ask your healthcare provider when you can stop using birth control to prevent pregnancy  How can I live a healthy lifestyle during and after menopause? After menopause, your risk for heart disease and bone loss increases  Ask about these and other ways to stay healthy:  Exercise regularly  Exercise helps you maintain a healthy weight  Exercise can also help to control your blood pressure and cholesterol levels  Include weight-bearing exercise for strong bones  Weight bearing exercise is recommended for at least 30 minutes, 3 times a week  Ask your healthcare provider about the best exercise plan for you  Eat a variety of healthy foods    Include fruits, vegetables, whole grains (whole-wheat bread, pasta, and cereals), low-fat dairy, and lean protein foods (beans, poultry, and fish)  Limit foods high in sodium (salt)  Ask your healthcare provider for more information about a meal plan that is right for you  Do not smoke  If you smoke, it is never too late to quit  You are more likely to have a heart attack, lung disease, blood clots, and cancer if you smoke  Ask your healthcare provider for information if you need help quitting  Take supplements as directed  You may need extra calcium and vitamin D to help prevent osteoporosis  Limit alcohol and caffeine  Alcohol and caffeine may worsen your symptoms  When should I call my doctor? You have vaginal bleeding after menopause  You have questions or concerns about your condition or care  CARE AGREEMENT:   You have the right to help plan your care  Learn about your health condition and how it may be treated  Discuss treatment options with your healthcare providers to decide what care you want to receive  You always have the right to refuse treatment  The above information is an  only  It is not intended as medical advice for individual conditions or treatments  Talk to your doctor, nurse or pharmacist before following any medical regimen to see if it is safe and effective for you  © Copyright [employer] 2022 Information is for End User's use only and may not be sold, redistributed or otherwise used for commercial purposes  All illustrations and images included in CareNotes® are the copyrighted property of A [employer] "[employer] , Inc  or [facility] "[employer] Visit for Adults   AMBULATORY CARE:   A wellness visit  is when you see your healthcare provider to get screened for health problems  Your healthcare provider will also give you advice on how to stay healthy  Write down your questions so you remember to ask them   Ask your healthcare provider how often you should have a wellness visit   What happens at a wellness visit:  Your healthcare provider will ask about your health, and your family history of health problems  This includes high blood pressure, heart disease, and cancer  He or she will ask if you have symptoms that concern you, if you smoke, and about your mood  You may also be asked about your intake of medicines, supplements, food, and alcohol  Any of the following may be done: Your weight  will be checked  Your height may also be checked so your body mass index (BMI) can be calculated  Your BMI shows if you are at a healthy weight  Your blood pressure  and heart rate will be checked  Your temperature may also be checked  Blood and urine tests  may be done  Blood tests may be done to check your cholesterol levels  Abnormal cholesterol levels increase your risk for heart disease and stroke  You may also need a blood or urine test to check for diabetes if you are at increased risk  Urine tests may be done to look for signs of an infection or kidney disease  A physical exam  includes checking your heartbeat and lungs with a stethoscope  Your healthcare provider may also check your skin to look for sun damage  Screening tests  may be recommended  A screening test is done to check for diseases that may not cause symptoms  The screening tests you may need depend on your age, gender, family history, and lifestyle habits  For example, colorectal screening may be recommended if you are 48years old or older  Screening tests you need if you are a woman:   A Pap smear  is used to screen for cervical cancer  Pap smears are usually done every 3 to 5 years depending on your age  You may need them more often if you have had abnormal Pap smear test results in the past  Ask your healthcare provider how often you should have a Pap smear  A mammogram  is an x-ray of your breasts to screen for breast cancer  Experts recommend mammograms every 2 years starting at age 48 years   You may need a mammogram at age 52 years or younger if you have an increased risk for breast cancer  Talk to your healthcare provider about when you should start having mammograms and how often you need them  Vaccines you may need:   Get an influenza vaccine  every year  The influenza vaccine protects you from the flu  Several types of viruses cause the flu  The viruses change over time, so new vaccines are made each year  Get a tetanus-diphtheria (Td) booster vaccine  every 10 years  This vaccine protects you against tetanus and diphtheria  Tetanus is a severe infection that may cause painful muscle spasms and lockjaw  Diphtheria is a severe bacterial infection that causes a thick covering in the back of your mouth and throat  Get a human papillomavirus (HPV) vaccine  if you are female and aged 23 to 32 or male 23 to 24 and never received it  This vaccine protects you from HPV infection  HPV is the most common infection spread by sexual contact  HPV may also cause vaginal, penile, and anal cancers  Get a pneumococcal vaccine  if you are aged 72 years or older  The pneumococcal vaccine is an injection given to protect you from pneumococcal disease  Pneumococcal disease is an infection caused by pneumococcal bacteria  The infection may cause pneumonia, meningitis, or an ear infection  Get a shingles vaccine  if you are 60 or older, even if you have had shingles before  The shingles vaccine is an injection to protect you from the varicella-zoster virus  This is the same virus that causes chickenpox  Shingles is a painful rash that develops in people who had chickenpox or have been exposed to the virus  How to eat healthy:  My Plate is a model for planning healthy meals  It shows the types and amounts of foods that should go on your plate  Fruits and vegetables make up about half of your plate, and grains and protein make up the other half  A serving of dairy is included on the side of your plate   The amount of calories and serving sizes you need depends on your age, gender, weight, and height  Examples of healthy foods are listed below:  Eat a variety of vegetables  such as dark green, red, and orange vegetables  You can also include canned vegetables low in sodium (salt) and frozen vegetables without added butter or sauces  Eat a variety of fresh fruits , canned fruit in 100% juice, frozen fruit, and dried fruit  Include whole grains  At least half of the grains you eat should be whole grains  Examples include whole-wheat bread, wheat pasta, brown rice, and whole-grain cereals such as oatmeal     Eat a variety of protein foods such as seafood (fish and shellfish), lean meat, and poultry without skin (turkey and chicken)  Examples of lean meats include pork leg, shoulder, or tenderloin, and beef round, sirloin, tenderloin, and extra lean ground beef  Other protein foods include eggs and egg substitutes, beans, peas, soy products, nuts, and seeds  Choose low-fat dairy products such as skim or 1% milk or low-fat yogurt, cheese, and cottage cheese  Limit unhealthy fats  such as butter, hard margarine, and shortening  Exercise:  Exercise at least 30 minutes per day on most days of the week  Some examples of exercise include walking, biking, dancing, and swimming  You can also fit in more physical activity by taking the stairs instead of the elevator or parking farther away from stores  Include muscle strengthening activities 2 days each week  Regular exercise provides many health benefits  It helps you manage your weight, and decreases your risk for type 2 diabetes, heart disease, stroke, and high blood pressure  Exercise can also help improve your mood  Ask your healthcare provider about the best exercise plan for you  General health and safety guidelines:   Do not smoke  Nicotine and other chemicals in cigarettes and cigars can cause lung damage   Ask your healthcare provider for information if you currently smoke and need help to quit  E-cigarettes or smokeless tobacco still contain nicotine  Talk to your healthcare provider before you use these products  Limit alcohol  A drink of alcohol is 12 ounces of beer, 5 ounces of wine, or 1½ ounces of liquor  Lose weight, if needed  Being overweight increases your risk of certain health conditions  These include heart disease, high blood pressure, type 2 diabetes, and certain types of cancer  Protect your skin  Do not sunbathe or use tanning beds  Use sunscreen with a SPF 15 or higher  Apply sunscreen at least 15 minutes before you go outside  Reapply sunscreen every 2 hours  Wear protective clothing, hats, and sunglasses when you are outside  Drive safely  Always wear your seatbelt  Make sure everyone in your car wears a seatbelt  A seatbelt can save your life if you are in an accident  Do not use your cell phone when you are driving  This could distract you and cause an accident  Pull over if you need to make a call or send a text message  Practice safe sex  Use latex condoms if are sexually active and have more than one partner  Your healthcare provider may recommend screening tests for sexually transmitted infections (STIs)  Wear helmets, lifejackets, and protective gear  Always wear a helmet when you ride a bike or motorcycle, go skiing, or play sports that could cause a head injury  Wear protective equipment when you play sports  Wear a lifejacket when you are on a boat or doing water sports  © Copyright [address] 2022 Information is for End User's use only and may not be sold, redistributed or otherwise used for commercial purposes  All illustrations and images included in CareNotes® are the copyrighted property of [employer]  or Antoni Weems   The above information is an  only  It is not intended as medical advice for individual conditions or treatments   Talk to your doctor, nurse or pharmacist before following any medical regimen to see if it is safe and effective for you  Perineal Hygiene     No soaps or feminine wash to the vulva  Use only water to cleanse, or water with Dove or [employer] if necessary  No lotion to the area  Use only coconut oil for moisture if needed   No douching     Cotton underware, loose fitting clothing  Only perfume-free, dye-free laundry detergent, use a second rinse cycle   Avoid fabric softeners/dryer sheets  Coconut oil as a lubricant (if not using condoms) or another scent-free lubricant (Astroglide, Uberlube) if needed  Partner to avoid the same products as well  Over the counter probiotic to restore vaginal zack may be helpful as well     You may also look into Boric Acid vaginal suppositories to restore vaginal PH balance for up to 2 weeks as directed on the box   You may not use these if you are pregnant

## 2022-07-16 DIAGNOSIS — F32.A ANXIETY AND DEPRESSION: ICD-10-CM

## 2022-07-16 DIAGNOSIS — F41.9 ANXIETY AND DEPRESSION: ICD-10-CM

## 2022-07-19 DIAGNOSIS — H93.12 TINNITUS OF LEFT EAR: Primary | ICD-10-CM
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
## 2022-07-21 ENCOUNTER — APPOINTMENT (OUTPATIENT)
Dept: LAB | Facility: CLINIC | Age: 62
End: 2022-07-21
Payer: COMMERCIAL

## 2022-07-21 ENCOUNTER — OFFICE VISIT (OUTPATIENT)
Dept: RHEUMATOLOGY | Facility: CLINIC | Age: 62
End: 2022-07-21
Payer: COMMERCIAL

## 2022-07-21 VITALS
WEIGHT: 208.7 LBS | HEIGHT: 63 IN | OXYGEN SATURATION: 99 % | HEART RATE: 66 BPM | DIASTOLIC BLOOD PRESSURE: 60 MMHG | BODY MASS INDEX: 36.98 KG/M2 | SYSTOLIC BLOOD PRESSURE: 116 MMHG

## 2022-07-21 DIAGNOSIS — R76.8 ANA POSITIVE: Primary | ICD-10-CM

## 2022-07-21 DIAGNOSIS — M25.50 POLYARTHRALGIA: ICD-10-CM

## 2022-07-21 DIAGNOSIS — H93.12 TINNITUS OF LEFT EAR: ICD-10-CM

## 2022-07-21 DIAGNOSIS — H93.13 TINNITUS OF BOTH EARS: ICD-10-CM

## 2022-07-21 LAB
C3 SERPL-MCNC: 146 MG/DL (ref 90–180)
C4 SERPL-MCNC: 29 MG/DL (ref 10–40)
CRP SERPL QL: 5 MG/L
ERYTHROCYTE [SEDIMENTATION RATE] IN BLOOD: 28 MM/HOUR (ref 0–29)

## 2022-07-21 PROCEDURE — 99245 OFF/OP CONSLTJ NEW/EST HI 55: CPT | Performed by: INTERNAL MEDICINE

## 2022-07-21 PROCEDURE — 86140 C-REACTIVE PROTEIN: CPT | Performed by: INTERNAL MEDICINE

## 2022-07-21 PROCEDURE — 86800 THYROGLOBULIN ANTIBODY: CPT | Performed by: INTERNAL MEDICINE

## 2022-07-21 PROCEDURE — 36415 COLL VENOUS BLD VENIPUNCTURE: CPT | Performed by: INTERNAL MEDICINE

## 2022-07-21 PROCEDURE — 85652 RBC SED RATE AUTOMATED: CPT | Performed by: INTERNAL MEDICINE

## 2022-07-21 PROCEDURE — 86160 COMPLEMENT ANTIGEN: CPT | Performed by: INTERNAL MEDICINE

## 2022-07-21 PROCEDURE — 86235 NUCLEAR ANTIGEN ANTIBODY: CPT | Performed by: INTERNAL MEDICINE

## 2022-07-21 PROCEDURE — 86376 MICROSOMAL ANTIBODY EACH: CPT | Performed by: INTERNAL MEDICINE

## 2022-07-21 PROCEDURE — 86200 CCP ANTIBODY: CPT | Performed by: INTERNAL MEDICINE

## 2022-07-22 LAB
CCP AB SER IA-ACNC: 1.6
CENTROMERE B AB SER-ACNC: <0.2 AI (ref 0–0.9)
ENA JO1 AB SER-ACNC: <0.2 AI (ref 0–0.9)
THYROGLOB AB SERPL-ACNC: <1 IU/ML (ref 0–0.9)
THYROPEROXIDASE AB SERPL-ACNC: <8 IU/ML (ref 0–34)

## 2022-07-27 DIAGNOSIS — H93.12 TINNITUS OF LEFT EAR: Primary | ICD-10-CM

## 2022-08-11 ENCOUNTER — TELEPHONE (OUTPATIENT)
Dept: NEUROLOGY | Facility: CLINIC | Age: 62
End: 2022-08-11

## 2022-08-11 NOTE — TELEPHONE ENCOUNTER
Patient called to schedule new patient appointment for tinnitis  No testing done  Triage intake sent

## 2022-08-28 ENCOUNTER — APPOINTMENT (OUTPATIENT)
Dept: RADIOLOGY | Facility: HOSPITAL | Age: 62
End: 2022-08-28
Payer: COMMERCIAL

## 2022-08-28 ENCOUNTER — HOSPITAL ENCOUNTER (EMERGENCY)
Facility: HOSPITAL | Age: 62
Discharge: HOME | End: 2022-08-28
Attending: EMERGENCY MEDICINE
Payer: COMMERCIAL

## 2022-08-28 ENCOUNTER — APPOINTMENT (EMERGENCY)
Dept: CT IMAGING | Facility: HOSPITAL | Age: 62
End: 2022-08-28
Payer: COMMERCIAL

## 2022-08-28 VITALS
HEART RATE: 85 BPM | DIASTOLIC BLOOD PRESSURE: 75 MMHG | TEMPERATURE: 98 F | SYSTOLIC BLOOD PRESSURE: 164 MMHG | OXYGEN SATURATION: 99 % | RESPIRATION RATE: 18 BRPM

## 2022-08-28 DIAGNOSIS — R74.01 ELEVATED TRANSAMINASE LEVEL: ICD-10-CM

## 2022-08-28 DIAGNOSIS — N28.89 RENAL MASS: ICD-10-CM

## 2022-08-28 DIAGNOSIS — R07.9 CHEST PAIN, UNSPECIFIED TYPE: Primary | ICD-10-CM

## 2022-08-28 LAB
2HR DELTA HS TROPONIN: 0 NG/L
4HR DELTA HS TROPONIN: 0 NG/L
ALBUMIN SERPL BCP-MCNC: 4.2 G/DL (ref 3.5–5)
ALP SERPL-CCNC: 138 U/L (ref 34–104)
ALT SERPL W P-5'-P-CCNC: 136 U/L (ref 7–52)
ANION GAP SERPL CALCULATED.3IONS-SCNC: 12 MMOL/L (ref 4–13)
AST SERPL W P-5'-P-CCNC: 350 U/L (ref 13–39)
BASOPHILS # BLD AUTO: 0.07 THOUSANDS/ΜL (ref 0–0.1)
BASOPHILS NFR BLD AUTO: 1 % (ref 0–1)
BILIRUB SERPL-MCNC: 1.1 MG/DL (ref 0.2–1)
BUN SERPL-MCNC: 17 MG/DL (ref 5–25)
CALCIUM SERPL-MCNC: 9.9 MG/DL (ref 8.4–10.2)
CARDIAC TROPONIN I PNL SERPL HS: 6 NG/L
CHLORIDE SERPL-SCNC: 105 MMOL/L (ref 96–108)
CO2 SERPL-SCNC: 23 MMOL/L (ref 21–32)
CREAT SERPL-MCNC: 0.86 MG/DL (ref 0.6–1.3)
D DIMER PPP FEU-MCNC: 2.12 UG/ML FEU
EOSINOPHIL # BLD AUTO: 0.07 THOUSAND/ΜL (ref 0–0.61)
EOSINOPHIL NFR BLD AUTO: 1 % (ref 0–6)
ERYTHROCYTE [DISTWIDTH] IN BLOOD BY AUTOMATED COUNT: 13.5 % (ref 11.6–15.1)
FLUAV RNA RESP QL NAA+PROBE: NEGATIVE
FLUBV RNA RESP QL NAA+PROBE: NEGATIVE
GFR SERPL CREATININE-BSD FRML MDRD: 73 ML/MIN/1.73SQ M
GLUCOSE SERPL-MCNC: 105 MG/DL (ref 65–140)
HCT VFR BLD AUTO: 39.4 % (ref 34.8–46.1)
HGB BLD-MCNC: 12.2 G/DL (ref 11.5–15.4)
IMM GRANULOCYTES # BLD AUTO: 0.04 THOUSAND/UL (ref 0–0.2)
IMM GRANULOCYTES NFR BLD AUTO: 0 % (ref 0–2)
LYMPHOCYTES # BLD AUTO: 1.58 THOUSANDS/ΜL (ref 0.6–4.47)
LYMPHOCYTES NFR BLD AUTO: 11 % (ref 14–44)
MCH RBC QN AUTO: 26.6 PG (ref 26.8–34.3)
MCHC RBC AUTO-ENTMCNC: 31 G/DL (ref 31.4–37.4)
MCV RBC AUTO: 86 FL (ref 82–98)
MONOCYTES # BLD AUTO: 0.8 THOUSAND/ΜL (ref 0.17–1.22)
MONOCYTES NFR BLD AUTO: 6 % (ref 4–12)
NEUTROPHILS # BLD AUTO: 11.6 THOUSANDS/ΜL (ref 1.85–7.62)
NEUTS SEG NFR BLD AUTO: 81 % (ref 43–75)
NRBC BLD AUTO-RTO: 0 /100 WBCS
PLATELET # BLD AUTO: 158 THOUSANDS/UL (ref 149–390)
PMV BLD AUTO: 12.6 FL (ref 8.9–12.7)
POTASSIUM SERPL-SCNC: 3.8 MMOL/L (ref 3.5–5.3)
PROT SERPL-MCNC: 6.9 G/DL (ref 6.4–8.4)
RBC # BLD AUTO: 4.59 MILLION/UL (ref 3.81–5.12)
RSV RNA RESP QL NAA+PROBE: NEGATIVE
SARS-COV-2 RNA RESP QL NAA+PROBE: NEGATIVE
SODIUM SERPL-SCNC: 140 MMOL/L (ref 135–147)
WBC # BLD AUTO: 14.16 THOUSAND/UL (ref 4.31–10.16)

## 2022-08-28 PROCEDURE — 74176 CT ABD & PELVIS W/O CONTRAST: CPT

## 2022-08-28 PROCEDURE — 93005 ELECTROCARDIOGRAM TRACING: CPT

## 2022-08-28 PROCEDURE — 36415 COLL VENOUS BLD VENIPUNCTURE: CPT | Performed by: EMERGENCY MEDICINE

## 2022-08-28 PROCEDURE — 85379 FIBRIN DEGRADATION QUANT: CPT | Performed by: EMERGENCY MEDICINE

## 2022-08-28 PROCEDURE — 99285 EMERGENCY DEPT VISIT HI MDM: CPT | Performed by: EMERGENCY MEDICINE

## 2022-08-28 PROCEDURE — 99285 EMERGENCY DEPT VISIT HI MDM: CPT

## 2022-08-28 PROCEDURE — G1004 CDSM NDSC: HCPCS

## 2022-08-28 PROCEDURE — 71275 CT ANGIOGRAPHY CHEST: CPT

## 2022-08-28 PROCEDURE — 0241U HB NFCT DS VIR RESP RNA 4 TRGT: CPT | Performed by: EMERGENCY MEDICINE

## 2022-08-28 PROCEDURE — 80053 COMPREHEN METABOLIC PANEL: CPT | Performed by: EMERGENCY MEDICINE

## 2022-08-28 PROCEDURE — 85025 COMPLETE CBC W/AUTO DIFF WBC: CPT | Performed by: EMERGENCY MEDICINE

## 2022-08-28 PROCEDURE — 84484 ASSAY OF TROPONIN QUANT: CPT | Performed by: EMERGENCY MEDICINE

## 2022-08-28 PROCEDURE — 71045 X-RAY EXAM CHEST 1 VIEW: CPT

## 2022-08-28 RX ORDER — ACETAMINOPHEN 325 MG/1
650 TABLET ORAL ONCE
Status: COMPLETED | OUTPATIENT
Start: 2022-08-28 | End: 2022-08-28

## 2022-08-28 RX ADMIN — ACETAMINOPHEN 650 MG: 325 TABLET ORAL at 18:11

## 2022-08-28 RX ADMIN — IOHEXOL 80 ML: 350 INJECTION, SOLUTION INTRAVENOUS at 19:17

## 2022-08-28 NOTE — ED PROVIDER NOTES
History  Chief Complaint   Patient presents with    Chest Pain     Started 12pm  Pressure mid upper chest     53-year-old female presents emergency room complaining of chest pain  Patient notes that she has a history of lymphoma and anemia and has been getting iron infusions  The patient states she started to get chest pain and tightness with trying to take a deep breath starting around noon today  The patient denies any fever but admits to chills and denies any cough  The patient is currently vaccinated against COVID and denies any abdominal pain  She also notes she had some pain in her right flank which self-resolved  The patient states that she has been going to the bathroom a lot with respect urination and defecation  The patient has no sick contacts at this time  Prior to Admission Medications   Prescriptions Last Dose Informant Patient Reported? Taking?    Ascorbic Acid (vitamin C) 1000 MG tablet  Self Yes No   Sig: Take 1 tablet by mouth daily   Omeprazole 20 MG TBDD  Self Yes No   Sig: Take 1 tablet by mouth daily   VITAMIN D PO  Self Yes No   Sig: Take by mouth     ergocalciferol (VITAMIN D2) 50,000 units   No No   Sig: Take 1 capsule (50,000 Units total) by mouth once a week   fluticasone (FLONASE) 50 mcg/act nasal spray  Self No No   Si spray into each nostril daily   sertraline (ZOLOFT) 50 mg tablet   No No   Sig: TAKE 1 TABLET BY MOUTH DAILY AT BEDTIME      Facility-Administered Medications: None       Past Medical History:   Diagnosis Date    Lymphoma ([facility] [address] ) 2022    Right trigger finger        Past Surgical History:   Procedure Laterality Date    ANKLE HARDWARE REMOVAL      CHOLECYSTECTOMY      HYSTERECTOMY      IR BIOPSY BONE MARROW  2022    US GUIDED BREAST BIOPSY RIGHT COMPLETE Right 2022       Family History   Problem Relation Age of Onset    Hypertension Mother     Diabetes Brother     Breast cancer Maternal Grandmother 61    Colon cancer Neg Hx     Ovarian cancer Neg Hx     Uterine cancer Neg Hx     Cervical cancer Neg Hx      I have reviewed and agree with the history as documented  E-Cigarette/Vaping    E-Cigarette Use Never User      E-Cigarette/Vaping Substances    Nicotine No     THC No     CBD No     Flavoring No     Other No     Unknown No      Social History     Tobacco Use    Smoking status: Never Smoker    Smokeless tobacco: Never Used   Vaping Use    Vaping Use: Never used   Substance Use Topics    Alcohol use: Not Currently    Drug use: Never       Review of Systems   Constitutional: Negative for chills and fever  HENT: Negative for ear pain and sore throat  Eyes: Negative for pain and visual disturbance  Respiratory: Positive for shortness of breath  Negative for cough  Cardiovascular: Positive for chest pain  Negative for palpitations  Gastrointestinal: Negative for abdominal pain and vomiting  Genitourinary: Negative for dysuria and hematuria  Musculoskeletal: Negative for arthralgias and back pain  Skin: Negative for color change and rash  Neurological: Negative for seizures and syncope  All other systems reviewed and are negative  Physical Exam  Physical Exam  Vitals and nursing note reviewed  Constitutional:       General: She is not in acute distress  Appearance: Normal appearance  She is well-developed  HENT:      Head: Normocephalic and atraumatic  Right Ear: External ear normal       Left Ear: External ear normal       Nose: Nose normal       Mouth/Throat:      Mouth: Mucous membranes are moist    Eyes:      Conjunctiva/sclera: Conjunctivae normal    Cardiovascular:      Rate and Rhythm: Normal rate and regular rhythm  Pulses: Normal pulses  Heart sounds: Normal heart sounds  No murmur heard  Pulmonary:      Effort: Pulmonary effort is normal  No respiratory distress  Breath sounds: Normal breath sounds  No wheezing or rhonchi     Chest:      Chest wall: Tenderness present  Comments: Patient with tenderness to the sternum and the left sternal border  There is no paradoxical chest wall motion or subcutaneous emphysema  Abdominal:      General: Bowel sounds are normal       Palpations: Abdomen is soft  Tenderness: There is no abdominal tenderness  Musculoskeletal:         General: No swelling or deformity  Cervical back: Neck supple  Skin:     General: Skin is warm and dry  Capillary Refill: Capillary refill takes less than 2 seconds  Neurological:      General: No focal deficit present  Mental Status: She is alert and oriented to person, place, and time  Mental status is at baseline           Vital Signs  ED Triage Vitals   Temperature Pulse Respirations Blood Pressure SpO2   08/28/22 1845 08/28/22 1617 08/28/22 1617 08/28/22 1617 08/28/22 1617   98 7 °F (37 1 °C) (!) 121 20 170/88 98 %      Temp Source Heart Rate Source Patient Position - Orthostatic VS BP Location FiO2 (%)   08/28/22 1845 08/28/22 1617 08/28/22 1617 08/28/22 1617 --   Oral Monitor Lying Left arm       Pain Score       --                  Vitals:    08/28/22 1617 08/28/22 1845 08/28/22 2152   BP: 170/88 165/80 164/75   Pulse: (!) 121 97 85   Patient Position - Orthostatic VS: Lying Lying Lying         Visual Acuity      ED Medications  Medications   acetaminophen (TYLENOL) tablet 650 mg (650 mg Oral Given 8/28/22 1811)   iohexol (OMNIPAQUE) 350 MG/ML injection (MULTI-DOSE) 80 mL (80 mL Intravenous Given 8/28/22 1917)       Diagnostic Studies  Results Reviewed     Procedure Component Value Units Date/Time    FLU/RSV/COVID - if FLU/RSV clinically relevant [[id number]]  (Normal) Collected: 08/28/22 1937    Lab Status: Final result Specimen: Nares from Nose Updated: 08/28/22 2245     SARS-CoV-2 Negative     INFLUENZA A PCR Negative     INFLUENZA B PCR Negative     RSV PCR Negative    Narrative:      FOR PEDIATRIC PATIENTS - copy/paste COVID Guidelines URL to browser: [email or web address]    SARS-CoV-2 assay is a Nucleic Acid Amplification assay intended for the  qualitative detection of nucleic acid from SARS-CoV-2 in nasopharyngeal  swabs  Results are for the presumptive identification of SARS-CoV-2 RNA  Positive results are indicative of infection with SARS-CoV-2, the virus  causing COVID-19, but do not rule out bacterial infection or co-infection  with other viruses  Laboratories within the [place] and its  territories are required to report all positive results to the appropriate  public health authorities  Negative results do not preclude SARS-CoV-2  infection and should not be used as the sole basis for treatment or other  patient management decisions  Negative results must be combined with  clinical observations, patient history, and epidemiological information  This test has not been FDA cleared or approved  This test has been authorized by FDA under an Emergency Use Authorization  (EUA)  This test is only authorized for the duration of time the  declaration that circumstances exist justifying the authorization of the  emergency use of an in vitro diagnostic tests for detection of SARS-CoV-2  virus and/or diagnosis of COVID-19 infection under section 564(b)(1) of  the Act, 21 U  S C  460DBF-3(D)(0), unless the authorization is terminated  or revoked sooner  The test has been validated but independent review by FDA  and CLIA is pending  Test performed using [employer] GeneXpert: This RT-PCR assay targets N2,  a region unique to SARS-CoV-2  A conserved region in the E-gene was chosen  for pan-Sarbecovirus detection which includes SARS-CoV-2      HS Troponin I 4hr [[id number]]  (Normal) Collected: 08/28/22 2039    Lab Status: Final result Specimen: Blood from Arm, Left Updated: 08/28/22 2132     hs TnI 4hr 6 ng/L      Delta 4hr hsTnI 0 ng/L     HS Troponin I 2hr [[id number]]  (Normal) Collected: 08/28/22 1925 Lab Status: Final result Specimen: Blood from Arm, Left Updated: 08/28/22 2116     hs TnI 2hr 6 ng/L      Delta 2hr hsTnI 0 ng/L     HS Troponin 0hr (reflex protocol) [[id number]]  (Normal) Collected: 08/28/22 1634    Lab Status: Final result Specimen: Blood from Arm, Left Updated: 08/28/22 1706     hs TnI 0hr 6 ng/L     Comprehensive metabolic panel [[id number]]  (Abnormal) Collected: 08/28/22 1634    Lab Status: Final result Specimen: Blood from Arm, Left Updated: 08/28/22 1658     Sodium 140 mmol/L      Potassium 3 8 mmol/L      Chloride 105 mmol/L      CO2 23 mmol/L      ANION GAP 12 mmol/L      BUN 17 mg/dL      Creatinine 0 86 mg/dL      Glucose 105 mg/dL      Calcium 9 9 mg/dL       U/L       U/L      Alkaline Phosphatase 138 U/L      Total Protein 6 9 g/dL      Albumin 4 2 g/dL      Total Bilirubin 1 10 mg/dL      eGFR 73 ml/min/1 73sq m     Narrative:      Alexia guidelines for Chronic Kidney Disease (CKD):     Stage 1 with normal or high GFR (GFR > 90 mL/min/1 73 square meters)    Stage 2 Mild CKD (GFR = 60-89 mL/min/1 73 square meters)    Stage 3A Moderate CKD (GFR = 45-59 mL/min/1 73 square meters)    Stage 3B Moderate CKD (GFR = 30-44 mL/min/1 73 square meters)    Stage 4 Severe CKD (GFR = 15-29 mL/min/1 73 square meters)    Stage 5 End Stage CKD (GFR <15 mL/min/1 73 square meters)  Note: GFR calculation is accurate only with a steady state creatinine    D-dimer, quantitative [[id number]]  (Abnormal) Collected: 08/28/22 1634    Lab Status: Final result Specimen: Blood from Arm, Left Updated: 08/28/22 1657     D-Dimer, Quant 2 12 ug/ml FEU     Narrative: In the evaluation for possible pulmonary embolism, in the appropriate (Well's Score of 4 or less) patient, the age adjusted d-dimer cutoff for this patient can be calculated as:    Age x 0 01 (in ug/mL) for Age-adjusted D-dimer exclusion threshold for a patient over 50 years      CBC and differential [[id number]]  (Abnormal) Collected: 08/28/22 1634    Lab Status: Final result Specimen: Blood from Arm, Left Updated: 08/28/22 1640     WBC 14 16 Thousand/uL      RBC 4 59 Million/uL      Hemoglobin 12 2 g/dL      Hematocrit 39 4 %      MCV 86 fL      MCH 26 6 pg      MCHC 31 0 g/dL      RDW 13 5 %      MPV 12 6 fL      Platelets 260 Thousands/uL      nRBC 0 /100 WBCs      Neutrophils Relative 81 %      Immat GRANS % 0 %      Lymphocytes Relative 11 %      Monocytes Relative 6 %      Eosinophils Relative 1 %      Basophils Relative 1 %      Neutrophils Absolute 11 60 Thousands/µL      Immature Grans Absolute 0 04 Thousand/uL      Lymphocytes Absolute 1 58 Thousands/µL      Monocytes Absolute 0 80 Thousand/µL      Eosinophils Absolute 0 07 Thousand/µL      Basophils Absolute 0 07 Thousands/µL                  CT abdomen pelvis wo contrast   Final Result by Cindy Gordon MD (08/28 1947)      1  Large hiatal hernia  2   3 cm complex right renal cyst with partial calcification of the wall  A 6 month follow-up is recommended  3   Cholecystectomy  Workstation performed: [id number]         CTA ED chest PE study   Final Result by Katerin Vieira MD (08/28 1931)      No evidence of pulmonary embolus  Large hiatal hernia  Workstation performed: [id number]         XR chest 1 view portable   ED Interpretation by Yesy Chun DO (08/28 1877)   Questionable lingular infiltrate verses patient habitus      Final Result by Grazyna Sibley MD (08/29 4233)      No acute cardiopulmonary disease  Workstation performed: [id number]                    Procedures  ECG 12 Lead Documentation Only    Date/Time: 8/28/2022 4:24 PM  Performed by: Yesy Chun DO  Authorized by: Yesy Chun DO     ECG reviewed by me, the ED Provider: yes    Patient location:  ED  Comments:      EKG shows a sinus tach at 113 per  There is a normal axis    There is no definitive acute ST or T-wave changes noted  ED Course  ED Course as of 08/30/22 2154   Sun Aug 28, 2022   1647 WBC(!): 14 16   1701 D-Dimer, Quant(!): 2 12   1715 Case signed out to Deann Townsend pending CT scan  HEART Risk Score    Flowsheet Row Most Recent Value   Heart Score Risk Calculator    History 0 Filed at: 08/28/2022 2134   ECG 1 Filed at: 08/28/2022 2134   Age 1 Filed at: 08/28/2022 2134   Risk Factors 1 Filed at: 08/28/2022 2134   Troponin 0 Filed at: 08/28/2022 2134   HEART Score 3 Filed at: 08/28/2022 2134                            Parvin Ash' Criteria for PE    Flowsheet Row Most Recent Value   Cj Criteria for PE    Clinical signs and symptoms of DVT 0 Filed at: 08/28/2022 1728   PE is primary diagnosis or equally likely 3 Filed at: 08/28/2022 1728   HR >100 1 5 Filed at: 08/28/2022 1728   Immobilization at least 3 days or Surgery in the previous 4 weeks 0 Filed at: 08/28/2022 1728   Previous, objectively diagnosed PE or DVT 0 Filed at: 08/28/2022 1728   Hemoptysis 0 Filed at: 08/28/2022 1728   Malignancy with treatment within 6 months or palliative 1 Filed at: 08/28/2022 1728   Xu' Criteria Total 5 5 Filed at: 08/28/2022 1728                MDM    Disposition  Final diagnoses:   Chest pain, unspecified type   Renal mass   Elevated transaminase level     Time reflects when diagnosis was documented in both MDM as applicable and the Disposition within this note     Time User Action Codes Description Comment    8/28/2022  9:37 PM Swansea Brilliant [R07 9] Chest pain, unspecified type     8/28/2022  9:37 PM Loetta Pablo Add [N28 89] Renal mass     8/28/2022  9:38 PM Lomara Pablo Add [R74 01] Elevated transaminase level       ED Disposition     ED Disposition   Discharge    Condition   Stable    Date/Time   Sun Aug 28, 2022  9:37 PM    Comment   Mayda Fermin discharge to home/self care                 Follow-up Information     Follow up With Specialties Details Why [address], PA-C Family Medicine In 1 day  [address]  [address] [facility]  [phone]            Discharge Medication List as of 8/28/2022  9:40 PM      CONTINUE these medications which have NOT CHANGED    Details   Ascorbic Acid (vitamin C) 1000 MG tablet Take 1 tablet by mouth daily, Historical Med      ergocalciferol (VITAMIN D2) 50,000 units Take 1 capsule (50,000 Units total) by mouth once a week, Starting Fri 11/5/2021, Normal      fluticasone (FLONASE) 50 mcg/act nasal spray 1 spray into each nostril daily, Starting Tue 4/12/2022, Normal      Omeprazole 20 MG TBDD Take 1 tablet by mouth daily, Historical Med      sertraline (ZOLOFT) 50 mg tablet TAKE 1 TABLET BY MOUTH DAILY AT BEDTIME, Normal      VITAMIN D PO Take by mouth  , Historical Med                 PDMP Review     None          ED Provider  Electronically Signed by           Ivory Carrel , DO  08/30/22 8574

## 2022-08-29 LAB
ATRIAL RATE: 113 BPM
ATRIAL RATE: 82 BPM
ATRIAL RATE: 91 BPM
P AXIS: 16 DEGREES
P AXIS: 20 DEGREES
P AXIS: 5 DEGREES
PR INTERVAL: 118 MS
PR INTERVAL: 118 MS
PR INTERVAL: 120 MS
QRS AXIS: 21 DEGREES
QRS AXIS: 24 DEGREES
QRS AXIS: 32 DEGREES
QRSD INTERVAL: 72 MS
QRSD INTERVAL: 78 MS
QRSD INTERVAL: 80 MS
QT INTERVAL: 304 MS
QT INTERVAL: 354 MS
QT INTERVAL: 362 MS
QTC INTERVAL: 416 MS
QTC INTERVAL: 422 MS
QTC INTERVAL: 435 MS
T WAVE AXIS: 16 DEGREES
T WAVE AXIS: 20 DEGREES
T WAVE AXIS: 8 DEGREES
VENTRICULAR RATE: 113 BPM
VENTRICULAR RATE: 82 BPM
VENTRICULAR RATE: 91 BPM

## 2022-08-29 PROCEDURE — 93010 ELECTROCARDIOGRAM REPORT: CPT | Performed by: INTERNAL MEDICINE

## 2022-08-29 NOTE — ED CARE HANDOFF
Emergency Department Sign Out Note        Sign out and transfer of care from Dr Rosalinda Nichols  See Separate Emergency Department note  The patient, Elian Gutierrez, was evaluated by the previous provider for chest pain  Workup Completed:  CTA chest PE study, abdominal scan, laboratory workup    ED Course / Workup Pending (followup): Patient had complete resolution of symptoms  Extensive discussion regarding abnormal CT findings of right renal mass and need for close follow-up to rule out cancer  Also discussed abnormal liver labs and need for close follow-up with gastroenterology with a referral being placed for this  Discussed warning signs and symptoms with the patient as well as when to return to the emergency department versus follow up with [facility]  Patient states understanding and agreement with the plan  Heart score low risk  This note was completed using dictation software           HEART Risk Score    Flowsheet Row Most Recent Value   Heart Score Risk Calculator    History 0 Filed at: 08/28/2022 2134   ECG 1 Filed at: 08/28/2022 2134   Age 1 Filed at: 08/28/2022 2134   Risk Factors 1 Filed at: 08/28/2022 2134   Troponin 0 Filed at: 08/28/2022 2134   HEART Score 3 Filed at: 08/28/2022 2134                        Dorarchana Macias' Criteria for PE    Flowsheet Row Most Recent Value   Wells' Criteria for PE    Clinical signs and symptoms of DVT 0 Filed at: 08/28/2022 1728   PE is primary diagnosis or equally likely 3 Filed at: 08/28/2022 1728   HR >100 1 5 Filed at: 08/28/2022 1728   Immobilization at least 3 days or Surgery in the previous 4 weeks 0 Filed at: 08/28/2022 1728   Previous, objectively diagnosed PE or DVT 0 Filed at: 08/28/2022 1728   Hemoptysis 0 Filed at: 08/28/2022 1728   Malignancy with treatment within 6 months or palliative 1 Filed at: 08/28/2022 1728   Wells' Criteria Total 5 5 Filed at: 08/28/2022 1728                   Procedures  MDM        Disposition  Final diagnoses:   Chest pain, unspecified type   Renal mass   Elevated transaminase level     Time reflects when diagnosis was documented in both MDM as applicable and the Disposition within this note     Time User Action Codes Description Comment    8/28/2022  9:37 PM Trever Feeler Add [R07 9] Chest pain, unspecified type     8/28/2022  9:37 PM Trever Feeler Add [N28 89] Renal mass     8/28/2022  9:38 PM Christopher Feeler Add [R74 01] Elevated transaminase level       ED Disposition     ED Disposition   Discharge    Condition   Stable    Date/Time   Sun Aug 28, 2022 2137    Comment   Fely Donaldson discharge to home/self care                 Follow-up Information     Follow up With Specialties Details Why [address] [facility], [facility] Family Medicine In 1 day  [address]  [address]  [address]  [phone]          Discharge Medication List as of 8/28/2022  9:40 PM      CONTINUE these medications which have NOT CHANGED    Details   Ascorbic Acid (vitamin C) 1000 MG tablet Take 1 tablet by mouth daily, Historical Med      ergocalciferol (VITAMIN D2) 50,000 units Take 1 capsule (50,000 Units total) by mouth once a week, Starting Fri 11/5/2021, Normal      fluticasone (FLONASE) 50 mcg/act nasal spray 1 spray into each nostril daily, Starting Tue 4/12/2022, Normal      Omeprazole 20 MG TBDD Take 1 tablet by mouth daily, Historical Med      sertraline (ZOLOFT) 50 mg tablet TAKE 1 TABLET BY MOUTH DAILY AT BEDTIME, Normal      VITAMIN D PO Take by mouth  , Historical Med                  ED Provider  Electronically Signed by     Ruba Rodgers MD  08/29/22 4316

## 2022-08-31 ENCOUNTER — APPOINTMENT (OUTPATIENT)
Dept: LAB | Facility: CLINIC | Age: 62
End: 2022-08-31
Payer: COMMERCIAL

## 2022-08-31 ENCOUNTER — OFFICE VISIT (OUTPATIENT)
Dept: FAMILY MEDICINE CLINIC | Facility: CLINIC | Age: 62
End: 2022-08-31
Payer: COMMERCIAL

## 2022-08-31 VITALS
OXYGEN SATURATION: 98 % | WEIGHT: 206 LBS | DIASTOLIC BLOOD PRESSURE: 84 MMHG | SYSTOLIC BLOOD PRESSURE: 132 MMHG | TEMPERATURE: 97.4 F | BODY MASS INDEX: 36.5 KG/M2 | HEART RATE: 74 BPM | HEIGHT: 63 IN

## 2022-08-31 DIAGNOSIS — N28.1 KIDNEY CYSTS: ICD-10-CM

## 2022-08-31 DIAGNOSIS — C85.80 MARGINAL ZONE LYMPHOMA (HCC): ICD-10-CM

## 2022-08-31 DIAGNOSIS — C85.80 MARGINAL ZONE LYMPHOMA (HCC): Primary | ICD-10-CM

## 2022-08-31 DIAGNOSIS — R74.8 ELEVATED LIVER ENZYMES: ICD-10-CM

## 2022-08-31 DIAGNOSIS — H92.02 DISCOMFORT OF LEFT EAR: ICD-10-CM

## 2022-08-31 DIAGNOSIS — D72.829 LEUKOCYTOSIS, UNSPECIFIED TYPE: ICD-10-CM

## 2022-08-31 LAB
ALBUMIN SERPL BCP-MCNC: 3.8 G/DL (ref 3.5–5)
ALP SERPL-CCNC: 152 U/L (ref 46–116)
ALT SERPL W P-5'-P-CCNC: 104 U/L (ref 12–78)
ANION GAP SERPL CALCULATED.3IONS-SCNC: 5 MMOL/L (ref 4–13)
AST SERPL W P-5'-P-CCNC: 29 U/L (ref 5–45)
BASOPHILS # BLD AUTO: 0.11 THOUSANDS/ΜL (ref 0–0.1)
BASOPHILS NFR BLD AUTO: 1 % (ref 0–1)
BILIRUB SERPL-MCNC: 0.88 MG/DL (ref 0.2–1)
BUN SERPL-MCNC: 18 MG/DL (ref 5–25)
CALCIUM SERPL-MCNC: 9.7 MG/DL (ref 8.3–10.1)
CHLORIDE SERPL-SCNC: 107 MMOL/L (ref 96–108)
CO2 SERPL-SCNC: 26 MMOL/L (ref 21–32)
CREAT SERPL-MCNC: 0.9 MG/DL (ref 0.6–1.3)
EOSINOPHIL # BLD AUTO: 0.34 THOUSAND/ΜL (ref 0–0.61)
EOSINOPHIL NFR BLD AUTO: 2 % (ref 0–6)
ERYTHROCYTE [DISTWIDTH] IN BLOOD BY AUTOMATED COUNT: 14.1 % (ref 11.6–15.1)
GFR SERPL CREATININE-BSD FRML MDRD: 69 ML/MIN/1.73SQ M
GLUCOSE SERPL-MCNC: 100 MG/DL (ref 65–140)
HCT VFR BLD AUTO: 42.3 % (ref 34.8–46.1)
HGB BLD-MCNC: 12.9 G/DL (ref 11.5–15.4)
IMM GRANULOCYTES # BLD AUTO: 0.04 THOUSAND/UL (ref 0–0.2)
IMM GRANULOCYTES NFR BLD AUTO: 0 % (ref 0–2)
LIPASE SERPL-CCNC: 107 U/L (ref 73–393)
LYMPHOCYTES # BLD AUTO: 6.28 THOUSANDS/ΜL (ref 0.6–4.47)
LYMPHOCYTES NFR BLD AUTO: 44 % (ref 14–44)
MCH RBC QN AUTO: 26 PG (ref 26.8–34.3)
MCHC RBC AUTO-ENTMCNC: 30.5 G/DL (ref 31.4–37.4)
MCV RBC AUTO: 85 FL (ref 82–98)
MONOCYTES # BLD AUTO: 0.76 THOUSAND/ΜL (ref 0.17–1.22)
MONOCYTES NFR BLD AUTO: 5 % (ref 4–12)
NEUTROPHILS # BLD AUTO: 6.74 THOUSANDS/ΜL (ref 1.85–7.62)
NEUTS SEG NFR BLD AUTO: 48 % (ref 43–75)
NRBC BLD AUTO-RTO: 0 /100 WBCS
PLATELET # BLD AUTO: 259 THOUSANDS/UL (ref 149–390)
PMV BLD AUTO: 13.7 FL (ref 8.9–12.7)
POTASSIUM SERPL-SCNC: 3.8 MMOL/L (ref 3.5–5.3)
PROT SERPL-MCNC: 8.1 G/DL (ref 6.4–8.4)
RBC # BLD AUTO: 4.96 MILLION/UL (ref 3.81–5.12)
SODIUM SERPL-SCNC: 138 MMOL/L (ref 135–147)
WBC # BLD AUTO: 14.27 THOUSAND/UL (ref 4.31–10.16)

## 2022-08-31 PROCEDURE — 99214 OFFICE O/P EST MOD 30 MIN: CPT | Performed by: FAMILY MEDICINE

## 2022-08-31 PROCEDURE — 80053 COMPREHEN METABOLIC PANEL: CPT

## 2022-08-31 PROCEDURE — 36415 COLL VENOUS BLD VENIPUNCTURE: CPT

## 2022-08-31 PROCEDURE — 83690 ASSAY OF LIPASE: CPT

## 2022-08-31 PROCEDURE — 85025 COMPLETE CBC W/AUTO DIFF WBC: CPT

## 2022-08-31 NOTE — PROGRESS NOTES
Assessment/Plan:    No problem-specific Assessment & Plan notes found for this encounter  Diagnoses and all orders for this visit:    Marginal zone lymphoma ([facility] [address] )  -     Comprehensive metabolic panel; Future  -     CBC and differential; Future    Elevated liver enzymes  -     Comprehensive metabolic panel; Future  -     Lipase; Future    Leukocytosis, unspecified type  -     CBC and differential; Future    Discomfort of left ear  -     loratadine-pseudoephedrine (CLARITIN-D 12-HOUR) 5-120 mg per tablet; Take 1 tablet by mouth 2 (two) times a day for 5 days    Kidney cysts  Repeat in 6 months    F/U in 6 months or as needed if symptoms continue        Subjective:      Patient ID: Palma Garcia is a 63 y o  female  Patient is here because she has been having night sweats and chills  Had chest pain 3 days ago and went to the ER to be evaluated, heart attack was ruled out  Her liver enzymes were found to be elevated sees Dr Roberta Manzo  She had a CT of the chest and CT of the abdomen which showed a kidney cyst otherwise normal   Also she has been having left ear discomfort does have a Hx of ear tube placement  The following portions of the patient's history were reviewed and updated as appropriate:   She  has a past medical history of Lymphoma ([facility] [address] ) (02/2022) and Right trigger finger    She   Patient Active Problem List    Diagnosis Date Noted    Elevated liver enzymes 08/31/2022    Discomfort of left ear 08/31/2022    Kidney cysts 08/31/2022    Anxiety and depression 05/16/2022    History of iron deficiency anemia 04/27/2022    Marginal zone lymphoma ([facility] [address] ) 01/25/2022    Gastroesophageal reflux disease 01/10/2022    Acquired pes planus 12/06/2017    Arthritis 05/03/2017    Headache 05/03/2017    Heartburn 05/03/2017    Optic neuropathy, left 05/03/2017    Visual disturbance 05/03/2017    Post concussion syndrome 03/16/2017    Ascending aortic aneurysm (HCC) 01/26/2017    Cervical transverse process fracture, subsequent encounter 01/26/2017    Closed displaced fracture of lateral malleolus of left fibula 01/26/2017    Closed fracture of medial malleolus of left tibia 01/26/2017    Concussion with brief (less than one hour) loss of consciousness 01/26/2017    Detached retina, left 01/26/2017    Left renal mass 01/26/2017     She  has a past surgical history that includes Ankle hardware removal; Hysterectomy; Cholecystectomy; IR biopsy bone marrow (1/12/2022); and US guided breast biopsy right complete (Right, 4/5/2022)  Her family history includes Breast cancer (age of onset: 61) in her maternal grandmother; Diabetes in her brother; Hypertension in her mother  She  reports that she has never smoked  She has never used smokeless tobacco  She reports previous alcohol use  She reports that she does not use drugs  Current Outpatient Medications   Medication Sig Dispense Refill    Ascorbic Acid (vitamin C) 1000 MG tablet Take 1 tablet by mouth daily      ergocalciferol (VITAMIN D2) 50,000 units Take 1 capsule (50,000 Units total) by mouth once a week 12 capsule 0    fluticasone (FLONASE) 50 mcg/act nasal spray 1 spray into each nostril daily 16 g 0    loratadine-pseudoephedrine (CLARITIN-D 12-HOUR) 5-120 mg per tablet Take 1 tablet by mouth 2 (two) times a day for 5 days 10 tablet 0    Omeprazole 20 MG TBDD Take 1 tablet by mouth daily      sertraline (ZOLOFT) 50 mg tablet TAKE 1 TABLET BY MOUTH DAILY AT BEDTIME 30 tablet 2    VITAMIN D PO Take by mouth         No current facility-administered medications for this visit       Current Outpatient Medications on File Prior to Visit   Medication Sig    Ascorbic Acid (vitamin C) 1000 MG tablet Take 1 tablet by mouth daily    ergocalciferol (VITAMIN D2) 50,000 units Take 1 capsule (50,000 Units total) by mouth once a week    fluticasone (FLONASE) 50 mcg/act nasal spray 1 spray into each nostril daily    Omeprazole 20 MG TBDD Take 1 tablet by mouth daily    sertraline (ZOLOFT) 50 mg tablet TAKE 1 TABLET BY MOUTH DAILY AT BEDTIME    VITAMIN D PO Take by mouth       No current facility-administered medications on file prior to visit  She is allergic to benadryl [diphenhydramine] and erythromycin       Review of Systems   Constitutional: Positive for chills and fever  Negative for activity change, appetite change and fatigue  HENT: Positive for ear pain  Negative for congestion and ear discharge  Respiratory: Negative for cough and shortness of breath  Cardiovascular: Negative for chest pain and palpitations  Gastrointestinal: Negative for diarrhea and nausea  Musculoskeletal: Negative for arthralgias and back pain  Skin: Negative for color change and rash  Neurological: Negative for dizziness and headaches  Psychiatric/Behavioral: Negative for agitation and behavioral problems  Objective:      /84 (BP Location: Left arm, Patient Position: Sitting, Cuff Size: Large)   Pulse 74   Temp (!) 97 4 °F (36 3 °C)   Ht 5' 3" (1 6 m)   Wt 93 4 kg (206 lb)   SpO2 98%   BMI 36 49 kg/m²          Physical Exam  Constitutional:       General: She is not in acute distress  Appearance: She is well-developed  She is not diaphoretic  HENT:      Head: Normocephalic and atraumatic  Ears:      Comments: Left ear tube in place, no TM effusion or redness noted     Nose: Nose normal    Eyes:      Conjunctiva/sclera: Conjunctivae normal       Pupils: Pupils are equal, round, and reactive to light  Cardiovascular:      Rate and Rhythm: Normal rate and regular rhythm  Heart sounds: Normal heart sounds  No murmur heard  Pulmonary:      Effort: Pulmonary effort is normal  No respiratory distress  Breath sounds: Normal breath sounds  No wheezing  Abdominal:      General: Bowel sounds are normal  There is no distension  Palpations: Abdomen is soft  Tenderness: There is no abdominal tenderness     Skin:     General: Skin is warm and dry  Findings: No erythema or rash  Neurological:      Mental Status: She is alert and oriented to person, place, and time

## 2022-10-04 ENCOUNTER — OFFICE VISIT (OUTPATIENT)
Dept: CARDIOLOGY CLINIC | Facility: CLINIC | Age: 62
End: 2022-10-04
Payer: COMMERCIAL

## 2022-10-04 VITALS
BODY MASS INDEX: 36.86 KG/M2 | HEART RATE: 78 BPM | DIASTOLIC BLOOD PRESSURE: 78 MMHG | SYSTOLIC BLOOD PRESSURE: 120 MMHG | HEIGHT: 63 IN | WEIGHT: 208 LBS

## 2022-10-04 DIAGNOSIS — R07.9 CHEST PAIN, UNSPECIFIED TYPE: ICD-10-CM

## 2022-10-04 DIAGNOSIS — R07.89 OTHER CHEST PAIN: Primary | ICD-10-CM

## 2022-10-04 PROCEDURE — 99243 OFF/OP CNSLTJ NEW/EST LOW 30: CPT | Performed by: INTERNAL MEDICINE

## 2022-10-04 NOTE — ASSESSMENT & PLAN NOTE
1 episode over 1 month ago  Symptoms lasted for hours without any abnormal troponin  At this point I do not believe that symptoms were coronary and no workup for coronary disease is required in the near term

## 2022-10-12 NOTE — PROGRESS NOTES
Patient ID: Fely Donaldson is a 63 y o  female  Plan:      Other chest pain  1 episode over 1 month ago  Symptoms lasted for hours without any abnormal troponin  At this point I do not believe that symptoms were coronary and no workup for coronary disease is required in the near term  Follow up Plan/Other summary comments:  I will be happy to see the patient again if the nature of the symptoms change  Mainly I reassured the patient today  HPI:  Patient is seen today in consultation from Bayron Farr regarding chest pain  In late August the patient had presented with chest pain  Symptoms lasted for hours  Troponin were negative  CT scan of the chest revealed large hiatal hernia  Since hospital discharge there has been no recurrence  She was asked to see Cardiology  Patient has moderate stable exertional dyspnea which she thinks has been gradual in onset likely related to diminished fitness  No further chest pain again  No family history of early CAD  Most recent or relevant cardiac/vascular testing:    I reviewed the EKG from 08/28/2022:  Completely normal       Past Surgical History:   Procedure Laterality Date    ANKLE HARDWARE REMOVAL      CHOLECYSTECTOMY      HYSTERECTOMY      IR BIOPSY BONE MARROW  1/12/2022    US GUIDED BREAST BIOPSY RIGHT COMPLETE Right 4/5/2022     CMP:   Lab Results   Component Value Date    K 3 8 08/31/2022     08/31/2022    CO2 26 08/31/2022    BUN 18 08/31/2022    CREATININE 0 90 08/31/2022    EGFR 69 08/31/2022       Lipid Profile:   Lab Results   Component Value Date    TRIG 165 (H) 11/05/2021    HDL 36 (L) 11/05/2021         Review of Systems   10  point ROS  was otherwise non pertinent or negative except as per HPI or as below  Gait: Normal          Objective:     /78   Pulse 78   Ht 5' 3" (1 6 m)   Wt 94 3 kg (208 lb)   BMI 36 85 kg/m²     PHYSICAL EXAM:    General:  Normal appearance in no distress  Eyes:  Anicteric    Oral mucosa:  Moist   Neck:  No JVD  Carotid upstrokes are brisk without bruits  No masses  Chest:  Clear to auscultation  Cardiac:  No palpable PMI  Normal S1 and S2  No murmur gallop or rub  Abdomen:  Soft and nontender  No palpable organomegaly or aortic enlargement  Extremities:  No peripheral edema  Musculoskeletal:  Symmetric  Vascular:  Femoral pulses are brisk without bruits  Popliteal pulses are intact bilaterally  Pedal pulses are intact  Neuro:  Grossly symmetric  Psych:  Alert and oriented x3          Current Outpatient Medications:     Ascorbic Acid (vitamin C) 1000 MG tablet, Take 1 tablet by mouth daily, Disp: , Rfl:     ergocalciferol (VITAMIN D2) 50,000 units, Take 1 capsule (50,000 Units total) by mouth once a week, Disp: 12 capsule, Rfl: 0    fluticasone (FLONASE) 50 mcg/act nasal spray, 1 spray into each nostril daily, Disp: 16 g, Rfl: 0    Omeprazole 20 MG TBDD, Take 1 tablet by mouth daily, Disp: , Rfl:     sertraline (ZOLOFT) 50 mg tablet, TAKE 1 TABLET BY MOUTH DAILY AT BEDTIME, Disp: 30 tablet, Rfl: 2    VITAMIN D PO, Take by mouth  , Disp: , Rfl:   Allergies   Allergen Reactions    Benadryl [Diphenhydramine] Anaphylaxis    Erythromycin Hives     Past Medical History:   Diagnosis Date    [facility]oma [facility] ) 02/2022    Right trigger finger            Social History     Tobacco Use   Smoking Status Never Smoker   Smokeless Tobacco Never Used Chonodrocutaneous Helical Advancement Flap Text: The defect edges were debeveled with a #15 scalpel blade.  Given the location of the defect and the proximity to free margins a chondrocutaneous helical advancement flap was deemed most appropriate.  Using a sterile surgical marker, the appropriate advancement flap was drawn incorporating the defect and placing the expected incisions within the relaxed skin tension lines where possible.    The area thus outlined was incised deep to adipose tissue with a #15 scalpel blade.  The skin margins were undermined to an appropriate distance in all directions utilizing iris scissors.

## 2022-10-24 ENCOUNTER — APPOINTMENT (OUTPATIENT)
Dept: LAB | Facility: CLINIC | Age: 62
End: 2022-10-24
Payer: COMMERCIAL

## 2022-10-24 DIAGNOSIS — C85.80 MARGINAL ZONE LYMPHOMA (HCC): ICD-10-CM

## 2022-10-24 LAB
ALBUMIN SERPL BCP-MCNC: 3.8 G/DL (ref 3.5–5)
ALP SERPL-CCNC: 111 U/L (ref 46–116)
ALT SERPL W P-5'-P-CCNC: 32 U/L (ref 12–78)
ANION GAP SERPL CALCULATED.3IONS-SCNC: 5 MMOL/L (ref 4–13)
AST SERPL W P-5'-P-CCNC: 20 U/L (ref 5–45)
BASOPHILS # BLD AUTO: 0.13 THOUSANDS/ÂΜL (ref 0–0.1)
BASOPHILS NFR BLD AUTO: 1 % (ref 0–1)
BILIRUB SERPL-MCNC: 0.91 MG/DL (ref 0.2–1)
BUN SERPL-MCNC: 21 MG/DL (ref 5–25)
CALCIUM SERPL-MCNC: 9.7 MG/DL (ref 8.3–10.1)
CHLORIDE SERPL-SCNC: 107 MMOL/L (ref 96–108)
CO2 SERPL-SCNC: 26 MMOL/L (ref 21–32)
CREAT SERPL-MCNC: 0.83 MG/DL (ref 0.6–1.3)
EOSINOPHIL # BLD AUTO: 0.17 THOUSAND/ÂΜL (ref 0–0.61)
EOSINOPHIL NFR BLD AUTO: 1 % (ref 0–6)
ERYTHROCYTE [DISTWIDTH] IN BLOOD BY AUTOMATED COUNT: 14.9 % (ref 11.6–15.1)
GFR SERPL CREATININE-BSD FRML MDRD: 76 ML/MIN/1.73SQ M
GLUCOSE P FAST SERPL-MCNC: 103 MG/DL (ref 65–99)
HCT VFR BLD AUTO: 39.5 % (ref 34.8–46.1)
HGB BLD-MCNC: 12.1 G/DL (ref 11.5–15.4)
IMM GRANULOCYTES # BLD AUTO: 0.04 THOUSAND/UL (ref 0–0.2)
IMM GRANULOCYTES NFR BLD AUTO: 0 % (ref 0–2)
LDH SERPL-CCNC: 250 U/L (ref 81–234)
LYMPHOCYTES # BLD AUTO: 7.04 THOUSANDS/ÂΜL (ref 0.6–4.47)
LYMPHOCYTES NFR BLD AUTO: 51 % (ref 14–44)
MCH RBC QN AUTO: 25.4 PG (ref 26.8–34.3)
MCHC RBC AUTO-ENTMCNC: 30.6 G/DL (ref 31.4–37.4)
MCV RBC AUTO: 83 FL (ref 82–98)
MONOCYTES # BLD AUTO: 0.82 THOUSAND/ÂΜL (ref 0.17–1.22)
MONOCYTES NFR BLD AUTO: 6 % (ref 4–12)
NEUTROPHILS # BLD AUTO: 5.71 THOUSANDS/ÂΜL (ref 1.85–7.62)
NEUTS SEG NFR BLD AUTO: 41 % (ref 43–75)
NRBC BLD AUTO-RTO: 0 /100 WBCS
PLATELET # BLD AUTO: 216 THOUSANDS/UL (ref 149–390)
PMV BLD AUTO: 13.4 FL (ref 8.9–12.7)
POTASSIUM SERPL-SCNC: 3.9 MMOL/L (ref 3.5–5.3)
PROT SERPL-MCNC: 7.5 G/DL (ref 6.4–8.4)
RBC # BLD AUTO: 4.77 MILLION/UL (ref 3.81–5.12)
SODIUM SERPL-SCNC: 138 MMOL/L (ref 135–147)
WBC # BLD AUTO: 13.91 THOUSAND/UL (ref 4.31–10.16)

## 2022-10-24 PROCEDURE — 36415 COLL VENOUS BLD VENIPUNCTURE: CPT

## 2022-10-24 PROCEDURE — 85025 COMPLETE CBC W/AUTO DIFF WBC: CPT

## 2022-10-24 PROCEDURE — 80053 COMPREHEN METABOLIC PANEL: CPT

## 2022-10-24 PROCEDURE — 83615 LACTATE (LD) (LDH) ENZYME: CPT

## 2022-10-31 ENCOUNTER — VBI (OUTPATIENT)
Dept: ADMINISTRATIVE | Facility: OTHER | Age: 62
End: 2022-10-31

## 2022-11-01 ENCOUNTER — OFFICE VISIT (OUTPATIENT)
Dept: HEMATOLOGY ONCOLOGY | Facility: CLINIC | Age: 62
End: 2022-11-01

## 2022-11-01 VITALS
RESPIRATION RATE: 16 BRPM | DIASTOLIC BLOOD PRESSURE: 86 MMHG | WEIGHT: 209 LBS | HEART RATE: 71 BPM | TEMPERATURE: 98 F | HEIGHT: 63 IN | SYSTOLIC BLOOD PRESSURE: 128 MMHG | BODY MASS INDEX: 37.03 KG/M2 | OXYGEN SATURATION: 98 %

## 2022-11-01 DIAGNOSIS — C85.80 MARGINAL ZONE LYMPHOMA (HCC): Primary | ICD-10-CM

## 2022-11-01 NOTE — PROGRESS NOTES
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: Final Diagnosis   A, B & C  Bone marrow, right iliac, core needle biopsy, aspirate clot section & aspirate:  - Low grade B-cell lymphoma, nonspecific immunophenotype, infiltrating marrow in an interstitial & nodular pattern, comprising 30-35% of cells of an hypercellular for age (65-70%) marrow - see Note         Note    Immunohistochemical stains & colorimetric RNA in situ hybridization (CISH), evaluated with appropriate positive & negative controls, are employed in the enumeration of marrow B-lymphocytes (CD20), T-lymphocytes (CD3) & plasma cells (, kappa-CISH and lambda-CISH)  Results of these studies are given in the final diagnosis and reveal low grade B-cell lymphoma, nonspecific immunophenotype  The differential diagnosis would include (but is not limited to) marginal zone lymphoma (MZL) in leukemic phase (greg, splenic and MALT type) and lymphoplasmacytic lymphoma  SUBJECTIVE      Initial Visit  Marily Bond is a 63 y o  here for new consultation with me today  The patient is referred by Charlene Baldwin and the reason for consultation is anemia      Her CBC is remarkable for leukocytosis and microcytic anemia with stable platelet count:   9/4/4221  10/23/2021  11/5/2021  11/30/2021    WBC 14 05 (H) 13 76 (H) 14 66 (H) 10 57 (H)   Hemoglobin 12 8 8 5 (L) 8 8 (L) 8 9 (L)   HCT 41 8 30 9 (L) 31 4 (L) 32 4 (L) MCV 82 70 (L) 69 (L) 69 (L)   Platelet Count 761 978 291 259     Her differential is remarkable for increased lymphocytes and basophils:   5/3/2017  10/23/2021  11/5/2021    Immature Grans Absolute  0 06 0 04   Absolute Neutrophils 7 54 6 98 7 55   Lymphocytes Absolute 5 23 (H) 5 76 (H) 5 77 (H)   Absolute Monocytes 0 96 0 70 0 91   Absolute Eosinophils 0 16 0 15 0 23   Basophils Absolute 0 12 (H) 0 11 (H) 0 16 (H)     Her iron panel shows significant iron deficiency:   11/5/2021 11/30/2021    Iron 26 (L) 20 (L)   Ferritin 4 (L) 9   Iron Saturation 5 (L) 5 (L)   TIBC 502 (H) 442      11/30/2021    Folate 15 7   Vitamin B-12 1,139 (H)       During her initial visit, she said that she has been anemic all her life, but it has never been investigated before  She had a hysterectomy for fibroids and secondary menorrhagia 8-9 years ago  She denied any abnormal vaginal bleeding since then  She is due for a colonoscopy and will schedule one after the visit  She denied risk factors for HIV or hepatitis  This Visit    Doing well overall  No f/c/n/cp/ap  No b-symptoms  No lymph nodes  I have reviewed the relevant past medical, surgical, social and family history  I have also reviewed allergies and medications for this patient  Review of Systems  Review of Systems   All other systems reviewed and are negative  OBJECTIVE     Physical Exam  Vitals:    11/01/22 0810   BP: 128/86   Pulse: 71   Resp: 16   Temp: 98 °F (36 7 °C)   SpO2: 98%   Weight: 94 8 kg (209 lb)   Height: 5' 3" (1 6 m)     Physical Exam  Vitals reviewed  Constitutional:       General: She is not in acute distress  Appearance: She is not ill-appearing  Eyes:      General: No scleral icterus  Cardiovascular:      Rate and Rhythm: Normal rate  Pulmonary:      Effort: Pulmonary effort is normal    Abdominal:      General: There is no distension  Musculoskeletal:         General: No swelling  Normal range of motion  Lymphadenopathy:      Cervical: No cervical adenopathy  Neurological:      General: No focal deficit present  Mental Status: She is alert and oriented to person, place, and time  Mental status is at baseline  Imaging  Relevant imaging reviewed in chart    Labs  Relevant labs reviewed in chart     ASSESSMENT & PLAN      Diagnosis ICD-10-CM Associated Orders   1  Marginal zone lymphoma (HCC)  C85 80 CBC and differential     Comprehensive metabolic panel     LD,Blood         63 y o  Female diagnosed with Marginal Zone Lymphoma (MZL) from bone marrow biopsy with trisomy 12 detection in January 2022  Discussion/Plan  As mentioned before, given no treatment criteria are being met, recommend surveillance with labs upon return visit  I provided education about her cancer diagnosis and reviewed the marrow pathology with her  Of importance, her [id number] testing was negative  All questions were answered to the patient's satisfaction during this encounter  They appreciated and thanked me for spending time with them  The patient knows the contact information for our office and know to reach out for any relevant concerns related to this encounter  For all other listed problems and medical diagnosis in his chart - they are managed by PCP and/or other specialists, which patient acknowledges        Dr Moise Kay

## 2022-11-03 DIAGNOSIS — F41.9 ANXIETY AND DEPRESSION: ICD-10-CM

## 2022-11-03 DIAGNOSIS — F32.A ANXIETY AND DEPRESSION: ICD-10-CM

## 2022-11-14 ENCOUNTER — VBI (OUTPATIENT)
Dept: ADMINISTRATIVE | Facility: OTHER | Age: 62
End: 2022-11-14

## 2022-11-17 ENCOUNTER — OFFICE VISIT (OUTPATIENT)
Dept: GASTROENTEROLOGY | Facility: CLINIC | Age: 62
End: 2022-11-17

## 2022-11-17 VITALS
HEIGHT: 63 IN | DIASTOLIC BLOOD PRESSURE: 86 MMHG | BODY MASS INDEX: 36.86 KG/M2 | WEIGHT: 208 LBS | HEART RATE: 86 BPM | OXYGEN SATURATION: 99 % | SYSTOLIC BLOOD PRESSURE: 122 MMHG

## 2022-11-17 DIAGNOSIS — S06.0X9A CONCUSSION WITH BRIEF (LESS THAN ONE HOUR) LOSS OF CONSCIOUSNESS: ICD-10-CM

## 2022-11-17 DIAGNOSIS — R74.01 ELEVATED TRANSAMINASE LEVEL: ICD-10-CM

## 2022-11-17 DIAGNOSIS — R74.8 ELEVATED LIVER ENZYMES: Primary | ICD-10-CM

## 2022-11-17 DIAGNOSIS — K21.9 GASTROESOPHAGEAL REFLUX DISEASE, UNSPECIFIED WHETHER ESOPHAGITIS PRESENT: ICD-10-CM

## 2022-11-17 PROBLEM — R10.84 GENERALIZED ABDOMINAL PAIN: Status: ACTIVE | Noted: 2022-11-17

## 2022-11-17 NOTE — PROGRESS NOTES
Follow-up Note -  [facility]  Lawson [place] 1960 63 y o  female     ASSESSMENT @ PLAN:   She is a 62-year-old female with an emergency room visit at the [facility] with severe epigastric abdominal pain a month ago with AST of 350 alkaline phosphatase 138 an ALT of 136 with CT imaging showing no PE and a large hiatal hernia and prior gallbladder removal   Her enzymes are now normal   His chronic dyspepsia and intermittent epigastric obtain from a 5 cm hiatal hernia  She had endoscopy with me in January 2022 with 5 cm hiatal hernia nonerosive gastritis and fundal gland polyps  She had a colonoscopy with left-sided diverticulosis and she had a video capsule endoscopy with a small ulcer-her hemoglobin is now normal     1 will order MRCP imaging    2 were going to go up on her omeprazole to 20 mg p o  b i d  for the next 8 weeks    3 her liver enzymes are now normal    Reason:  Abnormal LFTs and abdominal pain and GERD    HPI:  She is a 62-year-old female with abnormal LFTs in an emergency room visit a month ago  She went to the [facility] location with severe epigastric abdominal pain  CT PE protocol was negative and a CT of the abdomen showed large hiatal hernia small renal cyst and prior cholecystectomy  Her AST was 350 year alkaline phosphatase Will in the 138 her alk ALT was 136  Her liver enzymes have been followed up in they are normal   She reports that she has only had rare abdominal pain since then  She has chronic dyspepsia which is managed with omeprazole  She had endoscopy with me in January 2022 with 5 cm hiatal hernia mild nonerosive gastritis and fundal gland polyps  She had a colonoscopy showing left-sided diverticulosis  This was done for an iron deficiency anemia  She then had a video capsule endoscopy with a small ulcer  Recently she has not had heartburn regurgitation or dysphagia in the last month since the ER visit    She has been on omeprazole 20 mg daily   She does have obesity with central adiposity with BMI of 36 8  REVIEW OF SYSTEMS:     CONSTITUTIONAL: Denies any fever, chills, or rigors  Good appetite, and no recent weight loss  HEENT: No earache or tinnitus  Denies hearing loss or visual disturbances  CARDIOVASCULAR: No chest pain or palpitations  RESPIRATORY: Denies any cough, hemoptysis, shortness of breath or dyspnea on exertion  GASTROINTESTINAL: As noted in the History of Present Illness  GENITOURINARY: No problems with urination  Denies any hematuria or dysuria  NEUROLOGIC: No dizziness or vertigo, denies headaches  MUSCULOSKELETAL: Denies any muscle or joint pain  SKIN: Denies skin rashes or itching  ENDOCRINE: Denies excessive thirst  Denies intolerance to heat or cold  PSYCHOSOCIAL: Denies depression or anxiety  Denies any recent memory loss       Past Medical History:   Diagnosis Date   • [facility] ([facility] ) 02/2022   • Right trigger finger       Past Surgical History:   Procedure Laterality Date   • ANKLE HARDWARE REMOVAL     • CHOLECYSTECTOMY     • HYSTERECTOMY     • IR BIOPSY BONE MARROW  1/12/2022   • US GUIDED BREAST BIOPSY RIGHT COMPLETE Right 4/5/2022     Social History     Socioeconomic History   • Marital status: Legally      Spouse name: Not on file   • Number of children: Not on file   • Years of education: Not on file   • Highest education level: Not on file   Occupational History   • Not on file   Tobacco Use   • Smoking status: Never   • Smokeless tobacco: Never   Vaping Use   • Vaping Use: Never used   Substance and Sexual Activity   • Alcohol use: Not Currently   • Drug use: Never   • Sexual activity: Not Currently   Other Topics Concern   • Not on file   Social History Narrative   • Not on file     Social Determinants of Health     Financial Resource Strain: Not on file   Food Insecurity: Not on file   Transportation Needs: Not on file   Physical Activity: Not on file   Stress: Not on file   Social Connections: Not on file   Intimate Partner Violence: Not on file   Housing Stability: Not on file     Family History   Problem Relation Age of Onset   • Hypertension Mother    • Diabetes Brother    • Breast cancer Maternal Grandmother 61   • Colon cancer Neg Hx    • Ovarian cancer Neg Hx    • Uterine cancer Neg Hx    • Cervical cancer Neg Hx      Benadryl [diphenhydramine] and Erythromycin  Current Outpatient Medications   Medication Sig Dispense Refill   • Ascorbic Acid (vitamin C) 1000 MG tablet Take 1 tablet by mouth daily     • ergocalciferol (VITAMIN D2) 50,000 units Take 1 capsule (50,000 Units total) by mouth once a week 12 capsule 0   • fluticasone (FLONASE) 50 mcg/act nasal spray 1 spray into each nostril daily 16 g 0   • Omeprazole 20 MG TBDD Take 1 tablet by mouth 2 (two) times a day 60 tablet 3   • sertraline (ZOLOFT) 50 mg tablet TAKE 1 TABLET BY MOUTH EVERYDAY AT BEDTIME 30 tablet 2   • VITAMIN D PO Take by mouth         No current facility-administered medications for this visit  Blood pressure 122/86, pulse 86, height 5' 3" (1 6 m), weight 94 3 kg (208 lb), SpO2 99 %  PHYSICAL EXAM:     General Appearance:   Alert, cooperative, no distress, appears stated age    HEENT:   Normocephalic, atraumatic, anicteric      Neck:  Supple, symmetrical, trachea midline, no adenopathy;    thyroid: no enlargement/tenderness/nodules; no carotid  bruit or JVD    Lungs:   Clear to auscultation bilaterally; no rales, rhonchi or wheezing; respirations unlabored    Heart[de-identified]   S1 and S2 normal; regular rate and rhythm; no murmur, rub, or gallop     Abdomen:   Soft, non-tender, non-distended; normal bowel sounds; no masses, no organomegaly    Genitalia:   Deferred    Rectal:   Deferred    Extremities:  No cyanosis, clubbing or edema    Pulses:  2+ and symmetric all extremities    Skin:  Skin color, texture, turgor normal, no rashes or lesions    Lymph nodes:  No palpable cervical, axillary or inguinal lymphadenopathy        Lab Results   Component Value Date    WBC 13 91 (H) 10/24/2022    HGB 12 1 10/24/2022    HCT 39 5 10/24/2022    MCV 83 10/24/2022     10/24/2022     Lab Results   Component Value Date    CALCIUM 9 7 10/24/2022    K 3 9 10/24/2022    CO2 26 10/24/2022     10/24/2022    BUN 21 10/24/2022    CREATININE 0 83 10/24/2022     Lab Results   Component Value Date    ALT 32 10/24/2022    AST 20 10/24/2022    ALKPHOS 111 10/24/2022     No results found for: INR, PROTIME          Answers for HPI/ROS submitted by the patient on 11/16/2022  Frequency: intermittently  arthralgias: Yes  belching: Yes  flatus: Yes  headaches: Yes  myalgias: Yes  nausea: Yes  Relieved by: bowel movements  Diagnostic workup: CT scan

## 2022-11-27 ENCOUNTER — HOSPITAL ENCOUNTER (OUTPATIENT)
Dept: MRI IMAGING | Facility: HOSPITAL | Age: 62
Discharge: HOME | End: 2022-11-27

## 2022-11-27 DIAGNOSIS — R74.01 ELEVATED TRANSAMINASE LEVEL: ICD-10-CM

## 2022-11-27 DIAGNOSIS — R74.8 ELEVATED LIVER ENZYMES: ICD-10-CM

## 2022-11-27 RX ADMIN — GADOBUTROL 9 ML: 604.72 INJECTION INTRAVENOUS at 09:55

## 2022-11-28 ENCOUNTER — OFFICE VISIT (OUTPATIENT)
Dept: URGENT CARE | Facility: CLINIC | Age: 62
End: 2022-11-28

## 2022-11-28 VITALS
BODY MASS INDEX: 36.86 KG/M2 | WEIGHT: 208 LBS | OXYGEN SATURATION: 99 % | TEMPERATURE: 97.6 F | HEIGHT: 63 IN | RESPIRATION RATE: 18 BRPM | HEART RATE: 98 BPM

## 2022-11-28 DIAGNOSIS — R68.89 FLU-LIKE SYMPTOMS: Primary | ICD-10-CM

## 2022-11-28 NOTE — PROGRESS NOTES
[facility]        NAME: Twila Sigala is a 63 y o  female  : 1960    MRN: [id number]  DATE: 2022  TIME: 5:40 PM    Assessment and Plan   Flu-like symptoms [R68 89]  1  Flu-like symptoms  Covid/Flu-Office Collect    oseltamivir (TAMIFLU) 75 mg capsule            Patient Instructions       Follow up with PCP in 3-5 days  Proceed to  ER if symptoms worsen  Chief Complaint     Chief Complaint   Patient presents with   • Cough   • Nasal Congestion   • Earache   • Generalized Body Aches     Started 2 days ago          History of Present Illness       URI   This is a new problem  The current episode started yesterday  There has been no fever  Associated symptoms include congestion, coughing, ear pain, headaches, rhinorrhea and a sore throat  Pertinent negatives include no abdominal pain, chest pain, rash, swollen glands, vomiting or wheezing  Associated symptoms comments: Chest congestion with cough dry "hacking" cough    She has tried acetaminophen and decongestant for the symptoms  The treatment provided no relief  Review of Systems   Review of Systems   Constitutional: Positive for appetite change  Negative for activity change, chills and fever  HENT: Positive for congestion, ear pain, rhinorrhea and sore throat  Respiratory: Positive for cough  Negative for shortness of breath and wheezing  Cardiovascular: Negative for chest pain  Gastrointestinal: Negative for abdominal pain and vomiting  Skin: Negative for color change and rash  Neurological: Positive for headaches           Current Medications       Current Outpatient Medications:   •  Ascorbic Acid (vitamin C) 1000 MG tablet, Take 1 tablet by mouth daily, Disp: , Rfl:   •  ergocalciferol (VITAMIN D2) 50,000 units, Take 1 capsule (50,000 Units total) by mouth once a week, Disp: 12 capsule, Rfl: 0  •  Omeprazole 20 MG TBDD, Take 1 tablet by mouth 2 (two) times a day, Disp: 60 tablet, Rfl: 3  •  oseltamivir (TAMIFLU) 75 mg capsule, Take 1 capsule (75 mg total) by mouth every 12 (twelve) hours for 5 days, Disp: 10 capsule, Rfl: 0  •  sertraline (ZOLOFT) 50 mg tablet, TAKE 1 TABLET BY MOUTH EVERYDAY AT BEDTIME, Disp: 30 tablet, Rfl: 2  •  fluticasone (FLONASE) 50 mcg/act nasal spray, 1 spray into each nostril daily (Patient not taking: Reported on 11/28/2022), Disp: 16 g, Rfl: 0  •  VITAMIN D PO, Take by mouth   (Patient not taking: Reported on 11/28/2022), Disp: , Rfl:     Current Allergies     Allergies as of 11/28/2022 - Reviewed 11/28/2022   Allergen Reaction Noted   • Benadryl [diphenhydramine] Anaphylaxis 10/08/2021   • Erythromycin Hives 10/08/2021            The following portions of the patient's history were reviewed and updated as appropriate: allergies, current medications, past family history, past medical history, past social history, past surgical history and problem list      Past Medical History:   Diagnosis Date   • Anxiety    • Depression    • Lymphoma ([facility] ) 02/2022   • Right trigger finger        Past Surgical History:   Procedure Laterality Date   • ANKLE HARDWARE REMOVAL     • CHOLECYSTECTOMY     • HYSTERECTOMY     • IR BIOPSY BONE MARROW  1/12/2022   • US GUIDED BREAST BIOPSY RIGHT COMPLETE Right 4/5/2022       Family History   Problem Relation Age of Onset   • Hypertension Mother    • Diabetes Brother    • Breast cancer Maternal Grandmother 61   • Colon cancer Neg Hx    • Ovarian cancer Neg Hx    • Uterine cancer Neg Hx    • Cervical cancer Neg Hx          Medications have been verified  Objective   Pulse 98   Temp 97 6 °F (36 4 °C) (Temporal)   Resp 18   Ht 5' 3" (1 6 m)   Wt 94 3 kg (208 lb)   SpO2 99%   BMI 36 85 kg/m²        Physical Exam     Physical Exam  Vitals and nursing note reviewed  Constitutional:       General: She is not in acute distress  Appearance: Normal appearance  She is well-developed and normal weight  She is not ill-appearing     HENT:      Head: Normocephalic and atraumatic  Right Ear: Tympanic membrane, ear canal and external ear normal       Left Ear: Tympanic membrane, ear canal and external ear normal       Nose: Congestion and rhinorrhea present  Mouth/Throat:      Mouth: Mucous membranes are moist  No oral lesions  Pharynx: Oropharynx is clear  Posterior oropharyngeal erythema present  No oropharyngeal exudate  Eyes:      General: No scleral icterus (covid)  Extraocular Movements: Extraocular movements intact  Right eye: Normal extraocular motion  Left eye: Normal extraocular motion  Conjunctiva/sclera: Conjunctivae normal       Pupils: Pupils are equal, round, and reactive to light  Cardiovascular:      Rate and Rhythm: Normal rate and regular rhythm  Pulses: Normal pulses  Heart sounds: Normal heart sounds  Pulmonary:      Effort: Pulmonary effort is normal  No respiratory distress  Breath sounds: Normal breath sounds  No wheezing or rhonchi  Musculoskeletal:         General: Normal range of motion  Cervical back: Normal range of motion and neck supple  Lymphadenopathy:      Cervical: No cervical adenopathy  Skin:     General: Skin is warm and dry  Capillary Refill: Capillary refill takes less than 2 seconds  Findings: No lesion or rash  Neurological:      General: No focal deficit present  Mental Status: She is alert and oriented to person, place, and time  Gait: Gait normal    Psychiatric:         Mood and Affect: Mood normal          Behavior: Behavior normal          Thought Content:  Thought content normal          Judgment: Judgment normal

## 2022-11-28 NOTE — PATIENT INSTRUCTIONS
Cold Symptoms   WHAT YOU NEED TO KNOW:   A cold is an infection caused by a virus  The infection causes your upper respiratory system to become inflamed  Common symptoms of a cold include sneezing, dry throat, a stuffy nose, headache, watery eyes, and a cough  Your cough may be dry, or you may cough up mucus  You may also have muscle aches, joint pain, and tiredness  Rarely, you may have a fever  Most colds go away without treatment  DISCHARGE INSTRUCTIONS:   Return to the emergency department if:   You have increased tiredness and weakness  You are unable to eat  Your heart is beating much faster than usual for you  You see white spots in the back of your throat and your neck is swollen and sore to the touch  You see pinpoint or larger reddish-purple dots on your skin  Contact your healthcare provider if:   You have a fever higher than 102°F (38 9°C)  You have new or worsening shortness of breath  You have thick nasal drainage for more than 2 days  Your symptoms do not improve or get worse within 5 days  You have questions or concerns about your condition or care  Medicines: The following medicines may be suggested by your healthcare provider to decrease your cold symptoms  These medicines are available without a doctor's order  Ask which medicines to take and when to take them  Follow directions  NSAIDs or acetaminophen  help to bring down a fever or decrease pain  Decongestants  help decrease nasal stuffiness  Antihistamines  help decrease sneezing and a runny nose  Cough suppressants  help decrease how much you cough  Expectorants  help loosen mucus so you can cough it up  Take your medicine as directed  Contact your healthcare provider if you think your medicine is not helping or if you have side effects  Tell him of her if you are allergic to any medicine  Keep a list of the medicines, vitamins, and herbs you take   Include the amounts, and when and why you take them  Bring the list or the pill bottles to follow-up visits  Carry your medicine list with you in case of an emergency  Symptom relief: The following may help relieve cold symptoms, such as a dry throat and congestion:  Gargle with mouthwash or warm salt water as directed  Suck on throat lozenges or hard candy  Use a cold or warm vaporizer or humidifier to ease your breathing  Rest for at least 2 days and then as needed to decrease tiredness and weakness  Use petroleum based jelly around your nostrils to decrease irritation from blowing your nose  Drink liquids:  Liquids will help thin and loosen thick mucus so you can cough it up  Liquids will also keep you hydrated  Ask your healthcare provider which liquids are best for you and how much to drink each day  Prevent the spread of germs: You can spread your cold germs to others for at least 3 days after your symptoms start  Wash your hands often  Do not share items, such as eating utensils  Cover your nose and mouth when you cough or sneeze using the crook of your elbow instead of your hands  Throw used tissues in the garbage  Do not smoke:  Smoking may worsen your symptoms and increase the length of time you feel sick  Talk with your healthcare provider if you need help to stop smoking  Follow up with your doctor as directed:  Write down your questions so you remember to ask them during your visits  © Copyright [employer] 2022 Information is for End User's use only and may not be sold, redistributed or otherwise used for commercial purposes  All illustrations and images included in CareNotes® are the copyrighted property of A D A M , Inc  or [facility] Michael Weems   The above information is an  only  It is not intended as medical advice for individual conditions or treatments  Talk to your doctor, nurse or pharmacist before following any medical regimen to see if it is safe and effective for you

## 2022-11-28 NOTE — LETTER
November 28, 2022     Patient: Karolina Zaragoza   YOB: 1960   Date of Visit: 11/28/2022       To Whom it May Concern:    Karolina Zaragoza was seen in my clinic on 11/28/2022  She may return to work on 11/29/2022  If you have any questions or concerns, please don't hesitate to call           Sincerely,          Vishal Durham PA-C        CC: No Recipients

## 2022-11-29 ENCOUNTER — TELEPHONE (OUTPATIENT)
Dept: URGENT CARE | Facility: CLINIC | Age: 62
End: 2022-11-29

## 2022-11-29 LAB
FLUAV RNA RESP QL NAA+PROBE: POSITIVE
FLUBV RNA RESP QL NAA+PROBE: NEGATIVE
SARS-COV-2 RNA RESP QL NAA+PROBE: NEGATIVE

## 2022-11-29 RX ORDER — OSELTAMIVIR PHOSPHATE 75 MG/1
75 CAPSULE ORAL EVERY 12 HOURS SCHEDULED
Qty: 10 CAPSULE | Refills: 0 | Status: SHIPPED | OUTPATIENT
Start: 2022-11-29 | End: 2022-12-04

## 2022-11-29 NOTE — TELEPHONE ENCOUNTER
Patient was called and informed that her influenza PCR was positive for a period time flu was scribed and sent to her pharmacy

## 2022-12-01 ENCOUNTER — TELEPHONE (OUTPATIENT)
Dept: GASTROENTEROLOGY | Facility: CLINIC | Age: 62
End: 2022-12-01

## 2022-12-01 NOTE — TELEPHONE ENCOUNTER
LMOM for patient to phone back and schedule an appt in 6-8 weeks with either Francoise or Mallory Marcano

## 2022-12-01 NOTE — TELEPHONE ENCOUNTER
----- Message from Yuki Tobar MD sent at 12/1/2022 10:34 AM EST -----  Zohaib Ahuja-  The MRI shows a normal bile duct system  There is no stone in the bile duct  This is great news  It does show the large hiatal hernia  I am having the higher dose of the omeprazole is helping you  Have a great day      Please make sure she has a follow-up with the PA in the next 4-8 weeks

## 2023-01-17 ENCOUNTER — OFFICE VISIT (OUTPATIENT)
Dept: GASTROENTEROLOGY | Facility: CLINIC | Age: 63
End: 2023-01-17

## 2023-01-17 ENCOUNTER — TELEPHONE (OUTPATIENT)
Dept: GASTROENTEROLOGY | Facility: CLINIC | Age: 63
End: 2023-01-17

## 2023-01-17 VITALS
HEIGHT: 63 IN | SYSTOLIC BLOOD PRESSURE: 122 MMHG | DIASTOLIC BLOOD PRESSURE: 82 MMHG | WEIGHT: 201 LBS | HEART RATE: 85 BPM | BODY MASS INDEX: 35.61 KG/M2

## 2023-01-17 DIAGNOSIS — R11.0 NAUSEA: ICD-10-CM

## 2023-01-17 DIAGNOSIS — R10.32 LLQ PAIN: ICD-10-CM

## 2023-01-17 DIAGNOSIS — K21.00 GASTROESOPHAGEAL REFLUX DISEASE WITH ESOPHAGITIS WITHOUT HEMORRHAGE: Primary | ICD-10-CM

## 2023-01-17 DIAGNOSIS — R68.81 EARLY SATIETY: ICD-10-CM

## 2023-01-17 NOTE — PROGRESS NOTES
[facility] - Outpatient Follow-up Note  Vania Pereyra 63 y o  female MRN: [id number]  Encounter: [id number]          ASSESSMENT AND PLAN:      1  Gastroesophageal reflux disease with esophagitis without hemorrhage  2  Nausea  3  Early satiety  - She has known GERD with a 5 cm hiatal hernia with uncontrolled peptic symptoms on omeprazole 20 mg daily and difficulty getting her omeprazole 20 mg BID approved through her insurance  - EGD in January 2022 showed a 5 cm hiatal hernia with mild gastritis and fundal gland polyps  - She has failed protonix and nexium in the past, we will see if we can resubmit for the omeprazole 20 mg BID  - Continue anti-reflux diet  - Plan for GES given her nausea and early satiety, rule out gastroparesis      4  LLQ pain  5  Incomplete Evacuation  - She reports mild LLQ pain at times with the sensation of incomplete evacuation  - Discussed trying fiber supplementation daily and see if this helps reduce the sensation and the pain  - Colonoscopy January 2022 showed diverticulosis in the descending and sigmoid colon, otherwise normal exam    ______________________________________________________________________    SUBJECTIVE: Stephanie Root is a pleasant 72-year-old female presenting for follow-up after she was seen in November for an episode of significant epigastric pain and acutely elevated LFTs that brought her to the ER  She had imaging showing a large hiatal hernia which was already known  She underwent an MRCP after her office visit which did not show any biliary dilatation or choledocholithiasis  Her omeprazole was increased to twice a day which did seem to help her symptoms  She reports that she is having trouble getting omeprazole twice a day through her insurance and she is having to buy it over-the-counter because they will not approve it  She is mostly just taking omeprazole 20 mg/day right now    She has failed Protonix and Nexium in the past and reports that omeprazole has seemed to help her the most   She will get nauseous and have her own satiety at times  Sometimes she has to vomit to relieve her symptoms  She also reports left lower quadrant pain at times which will be relieved by having a bowel movement but she will feel like she is incompletely evacuating  Some days she has good bowel movements  REVIEW OF SYSTEMS IS OTHERWISE NEGATIVE  Historical Information   Past Medical History:   Diagnosis Date   • Anxiety    • Depression    • Lymphoma ([address] ) 02/2022   • Right trigger finger      Past Surgical History:   Procedure Laterality Date   • ANKLE HARDWARE REMOVAL     • CHOLECYSTECTOMY     • HYSTERECTOMY     • IR BIOPSY BONE MARROW  1/12/2022   • US GUIDED BREAST BIOPSY RIGHT COMPLETE Right 4/5/2022     Social History   Social History     Substance and Sexual Activity   Alcohol Use Not Currently     Social History     Substance and Sexual Activity   Drug Use Never     Social History     Tobacco Use   Smoking Status Never   Smokeless Tobacco Never     Family History   Problem Relation Age of Onset   • Hypertension Mother    • Diabetes Brother    • Breast cancer Maternal Grandmother 60   • Colon cancer Neg Hx    • Ovarian cancer Neg Hx    • Uterine cancer Neg Hx    • Cervical cancer Neg Hx        Meds/Allergies       Current Outpatient Medications:   •  Ascorbic Acid (vitamin C) 1000 MG tablet  •  ergocalciferol (VITAMIN D2) 50,000 units  •  Omeprazole 20 MG TBDD  •  sertraline (ZOLOFT) 50 mg tablet  •  VITAMIN D PO    Allergies   Allergen Reactions   • Benadryl [Diphenhydramine] Anaphylaxis   • Erythromycin Hives           Objective     Blood pressure 122/82, pulse 85, height 5' 3" (1 6 m), weight 91 2 kg (201 lb)  Body mass index is 35 61 kg/m²        PHYSICAL EXAM:      General Appearance:   Alert, cooperative, no distress   HEENT:   Normocephalic, atraumatic, anicteric      Neck:  Supple, symmetrical, trachea midline   Lungs:   Clear to auscultation bilaterally; no rales, rhonchi or wheezing; respirations unlabored    Heart[de-identified]   Regular rate and rhythm; no murmur, rub, or gallop  Abdomen:   Soft, non-tender, non-distended; normal bowel sounds; no masses, no organomegaly    Genitalia:   Deferred    Rectal:   Deferred    Extremities:  No cyanosis, clubbing or edema    Pulses:  2+ and symmetric    Skin:  No jaundice, rashes, or lesions    Lymph nodes:  No palpable cervical lymphadenopathy        Lab Results:   No visits with results within 1 Day(s) from this visit  Latest known visit with results is:   Office Visit on 11/28/2022   Component Date Value   • SARS-CoV-2 11/28/2022 Negative    • INFLUENZA A PCR 11/28/2022 Positive (A)    • INFLUENZA B PCR 11/28/2022 Negative          Radiology Results:   No results found

## 2023-01-18 ENCOUNTER — TELEPHONE (OUTPATIENT)
Dept: GASTROENTEROLOGY | Facility: CLINIC | Age: 63
End: 2023-01-18

## 2023-01-18 DIAGNOSIS — K21.00 GASTROESOPHAGEAL REFLUX DISEASE WITH ESOPHAGITIS WITHOUT HEMORRHAGE: Primary | ICD-10-CM

## 2023-01-18 RX ORDER — OMEPRAZOLE 40 MG/1
40 CAPSULE, DELAYED RELEASE ORAL DAILY
Qty: 30 CAPSULE | Refills: 2 | Status: SHIPPED | OUTPATIENT
Start: 2023-01-18

## 2023-01-18 NOTE — TELEPHONE ENCOUNTER
Pt's insurance denied the Omeprazole 20 mg tablets  Let Francoise Know  She put in for Omeprazole 40 mg Capsule as it is in the insurance Formulary Alternatives  Called pt's Pharmacy to see if it would go through and the it went through the insurance   The pt picked up the medication and hag a copay of $1

## 2023-01-26 ENCOUNTER — HOSPITAL ENCOUNTER (OUTPATIENT)
Dept: NUCLEAR MEDICINE | Facility: HOSPITAL | Age: 63
End: 2023-01-26

## 2023-01-26 DIAGNOSIS — R11.0 NAUSEA: ICD-10-CM

## 2023-01-26 DIAGNOSIS — R68.81 EARLY SATIETY: ICD-10-CM

## 2023-01-26 DIAGNOSIS — K21.00 GASTROESOPHAGEAL REFLUX DISEASE WITH ESOPHAGITIS WITHOUT HEMORRHAGE: ICD-10-CM

## 2023-01-27 ENCOUNTER — TELEPHONE (OUTPATIENT)
Dept: FAMILY MEDICINE CLINIC | Facility: CLINIC | Age: 63
End: 2023-01-27

## 2023-01-27 NOTE — TELEPHONE ENCOUNTER
1/27/2023 pt called and said that she has an infected tooth her dentist gave her antibiotic but she is in a lot of pain  She is scheduled for an extraction of her tooth not till April  She was wondering if you would be able to send in a rx for pain meds to [facility]

## 2023-01-28 ENCOUNTER — HOSPITAL ENCOUNTER (EMERGENCY)
Facility: HOSPITAL | Age: 63
Discharge: HOME | End: 2023-01-28
Attending: EMERGENCY MEDICINE | Admitting: EMERGENCY MEDICINE

## 2023-01-28 VITALS
OXYGEN SATURATION: 100 % | HEART RATE: 88 BPM | TEMPERATURE: 98 F | RESPIRATION RATE: 16 BRPM | SYSTOLIC BLOOD PRESSURE: 125 MMHG | DIASTOLIC BLOOD PRESSURE: 66 MMHG

## 2023-01-28 DIAGNOSIS — K08.89 PAIN, DENTAL: ICD-10-CM

## 2023-01-28 DIAGNOSIS — K02.9 DENTAL CARIES: ICD-10-CM

## 2023-01-28 DIAGNOSIS — K04.7 DENTAL INFECTION: ICD-10-CM

## 2023-01-28 RX ORDER — OXYCODONE HYDROCHLORIDE 5 MG/1
5 TABLET ORAL EVERY 6 HOURS PRN
Qty: 12 TABLET | Refills: 0 | Status: SHIPPED | OUTPATIENT
Start: 2023-01-28 | End: 2023-01-31

## 2023-01-28 RX ORDER — KETOROLAC TROMETHAMINE 30 MG/ML
15 INJECTION, SOLUTION INTRAMUSCULAR; INTRAVENOUS ONCE
Status: COMPLETED | OUTPATIENT
Start: 2023-01-28 | End: 2023-01-28

## 2023-01-28 RX ORDER — AMOXICILLIN AND CLAVULANATE POTASSIUM 875; 125 MG/1; MG/1
1 TABLET, FILM COATED ORAL EVERY 12 HOURS
Qty: 14 TABLET | Refills: 0 | Status: SHIPPED | OUTPATIENT
Start: 2023-01-28 | End: 2023-02-04

## 2023-01-28 RX ORDER — LIDOCAINE HYDROCHLORIDE 20 MG/ML
15 SOLUTION OROPHARYNGEAL ONCE
Status: COMPLETED | OUTPATIENT
Start: 2023-01-28 | End: 2023-01-28

## 2023-01-28 RX ORDER — AMOXICILLIN AND CLAVULANATE POTASSIUM 875; 125 MG/1; MG/1
1 TABLET, FILM COATED ORAL ONCE
Status: COMPLETED | OUTPATIENT
Start: 2023-01-28 | End: 2023-01-28

## 2023-01-28 RX ADMIN — AMOXICILLIN AND CLAVULANATE POTASSIUM 1 TABLET: 875; 125 TABLET, FILM COATED ORAL at 12:42

## 2023-01-28 RX ADMIN — LIDOCAINE HYDROCHLORIDE 15 ML: 20 SOLUTION ORAL at 12:42

## 2023-01-28 RX ADMIN — KETOROLAC TROMETHAMINE 15 MG: 30 INJECTION, SOLUTION INTRAMUSCULAR at 12:42

## 2023-01-28 NOTE — ED PROVIDER NOTES
History  Chief Complaint   Patient presents with   • Dental Pain     Patient reports taking OTC penicillin with no improvement       73-year-old female presenting for left-sided dental pain  Was seen by dentist and given antibiotics and states that she is not feeling better at this time  Does have an appointment  Denies fevers, sweats, chills, sore throat, cough, chest pain or shortness of breath, abdominal pain nausea vomiting diarrhea constipation dysuria hematuria  No difficulty with swallowing or breathing  Prior to Admission Medications   Prescriptions Last Dose Informant Patient Reported? Taking? Ascorbic Acid (vitamin C) 1000 MG tablet   Yes No   Sig: Take 1 tablet by mouth daily   Omeprazole 20 MG TBDD   No No   Sig: Take 1 tablet by mouth 2 (two) times a day   VITAMIN D PO   Yes No   Sig: Take by mouth   ergocalciferol (VITAMIN D2) 50,000 units   No No   Sig: Take 1 capsule (50,000 Units total) by mouth once a week   omeprazole (PriLOSEC) 40 MG capsule   No No   Sig: Take 1 capsule (40 mg total) by mouth daily   sertraline (ZOLOFT) 50 mg tablet   No No   Sig: TAKE 1 TABLET BY MOUTH EVERYDAY AT BEDTIME      Facility-Administered Medications: None       Past Medical History:   Diagnosis Date   • Anxiety    • Depression    • Lymphoma ([facility] [address] ) 02/2022   • Right trigger finger        Past Surgical History:   Procedure Laterality Date   • ANKLE HARDWARE REMOVAL     • CHOLECYSTECTOMY     • HYSTERECTOMY     • IR BIOPSY BONE MARROW  1/12/2022   • US GUIDED BREAST BIOPSY RIGHT COMPLETE Right 4/5/2022       Family History   Problem Relation Age of Onset   • Hypertension Mother    • Diabetes Brother    • Breast cancer Maternal Grandmother 61   • Colon cancer Neg Hx    • Ovarian cancer Neg Hx    • Uterine cancer Neg Hx    • Cervical cancer Neg Hx      I have reviewed and agree with the history as documented      E-Cigarette/Vaping   • E-Cigarette Use Never User      E-Cigarette/Vaping Substances   • Nicotine No    • THC No    • CBD No    • Flavoring No    • Other No    • Unknown No      Social History     Tobacco Use   • Smoking status: Never   • Smokeless tobacco: Never   Vaping Use   • Vaping Use: Never used   Substance Use Topics   • Alcohol use: Not Currently   • Drug use: Never       Review of Systems   HENT: Positive for dental problem  All other systems reviewed and are negative  Physical Exam  Physical Exam  Vitals and nursing note reviewed  Constitutional:       General: She is not in acute distress  Appearance: She is well-developed  She is not diaphoretic  HENT:      Head: Normocephalic and atraumatic  Right Ear: External ear normal       Left Ear: External ear normal       Nose: Nose normal       Mouth/Throat:      Lips: Pink  No lesions  Mouth: Mucous membranes are moist       Dentition: Abnormal dentition  Does not have dentures  Dental tenderness and dental caries present  No gingival swelling, dental abscesses or gum lesions  Eyes:      General: No scleral icterus  Right eye: No discharge  Left eye: No discharge  Conjunctiva/sclera: Conjunctivae normal       Pupils: Pupils are equal, round, and reactive to light  Neck:      Vascular: No JVD  Trachea: No tracheal deviation  Cardiovascular:      Rate and Rhythm: Normal rate and regular rhythm  Heart sounds: Normal heart sounds  No murmur heard  No friction rub  No gallop  Pulmonary:      Effort: Pulmonary effort is normal  No respiratory distress  Breath sounds: Normal breath sounds  No stridor  No wheezing or rales  Abdominal:      General: Bowel sounds are normal  There is no distension  Palpations: Abdomen is soft  There is no mass  Tenderness: There is no abdominal tenderness  There is no guarding  Musculoskeletal:         General: No tenderness or deformity  Normal range of motion  Cervical back: Normal range of motion and neck supple     Skin:     General: Skin is warm and dry  Coloration: Skin is not pale  Findings: No erythema or rash  Neurological:      Mental Status: She is alert and oriented to person, place, and time  Cranial Nerves: No cranial nerve deficit  Sensory: No sensory deficit  Motor: No abnormal muscle tone  Psychiatric:         Behavior: Behavior normal          Thought Content: Thought content normal          Judgment: Judgment normal          Vital Signs  ED Triage Vitals [01/28/23 1205]   Temperature Pulse Respirations Blood Pressure SpO2   98 °F (36 7 °C) 88 16 125/66 100 %      Temp Source Heart Rate Source Patient Position - Orthostatic VS BP Location FiO2 (%)   Tympanic Monitor Sitting Left arm --      Pain Score       4           Vitals:    01/28/23 1205   BP: 125/66   Pulse: 88   Patient Position - Orthostatic VS: Sitting         Visual Acuity      ED Medications  Medications   Lidocaine Viscous HCl (XYLOCAINE) 2 % mucosal solution 15 mL (15 mL Swish & Spit Given 1/28/23 1242)   ketorolac (TORADOL) injection 15 mg (15 mg Intramuscular Given 1/28/23 1242)   amoxicillin-clavulanate (AUGMENTIN) 875-125 mg per tablet 1 tablet (1 tablet Oral Given 1/28/23 1242)       Diagnostic Studies  Results Reviewed     None                 No orders to display              Procedures  Procedures         ED Course                                             Medical Decision Making  Patient with poor dentition and noted tenderness with no obvious abscess  No facial swelling and no oropharyngeal swelling or concern for airway involvement  Given Augmentin as she was previously on penicillin and not getting better  Also given pain medication  Should return precautions  Given referral for oral maxillofacial surgery  Patient with history as above presented with Patient presents with:  Dental Pain: Patient reports taking OTC penicillin with no improvement      History obtained from patient    Patient was nontoxic, stable  Ambulatory  Exam as above  Reviewed external records  Differential diagnosis considered  Overall presentation is consistent with dental caries  Low suspicion for abscess  Patient was treated with antibiotics and pain medicaitons with improvement in symptoms  Patient was stable for outpatient management  Disposition: Discussed need to follow up diagnostics, including incidental findings  Discharged with instructions to obtain outpatient follow up of patient’s symptoms and findings, with strict return precautions if patient develops new or worsening symptoms  This medical documentation was created using an electronic medical record system with [employer] voice recognition  Although this document has been carefully reviewed, there may still be some phonetic and typographical errors  These errors are purely typographical and due to imperfections of the software program, do not reflect any compromise in the patient's medical care  Dental caries: complicated acute illness or injury  Dental infection: complicated acute illness or injury  Pain, dental: complicated acute illness or injury  Risk  Prescription drug management            Disposition  Final diagnoses:   Pain, dental   Dental caries   Dental infection     Time reflects when diagnosis was documented in both MDM as applicable and the Disposition within this note     Time User Action Codes Description Comment    1/28/2023 12:10 PM Birch Tree Sitter Add [K08 89] Pain, dental     1/28/2023 12:10 PM Birch Tree Sitter Add [K02 9] Dental caries     1/28/2023 12:10 PM Birch Tree Sitter Add [K04 7] Dental infection     1/28/2023 12:36 PM Birch Tree Sitter Modify [K08 89] Pain, dental     1/28/2023 12:36 PM Birch Tree Sitter Modify [K02 9] Dental caries     1/28/2023 12:36 PM Birch Tree Sitter Modify [K04 7] Dental infection       ED Disposition     ED Disposition   Discharge    Condition   Stable    Date/Time   Sat Jan 28, 2023 12:16 PM    Comment Jessie Phelan discharge to home/self care                 Follow-up Information     Follow up With Specialties Details Why Contact Info Additional Information    Fortino Lowe, DMD Oral Maxillofacial Surgery   [place] [address] [address], PA-C Family Medicine   [facility] [address]  [address]  [address]       [facility] Emergency Department Emergency Medicine Go to  As needed, If symptoms worsen [address]  [place] [id number]  [phone] [facility] Emergency Department, [address], [address] [facility]          Discharge Medication List as of 1/28/2023 12:38 PM      START taking these medications    Details   amoxicillin-clavulanate (AUGMENTIN) 875-125 mg per tablet Take 1 tablet by mouth every 12 (twelve) hours for 7 days, Starting Sat 1/28/2023, Until Sat 2/4/2023, Normal      oxyCODONE (ROXICODONE) 5 immediate release tablet Take 1 tablet (5 mg total) by mouth every 6 (six) hours as needed for moderate pain for up to 3 days Max Daily Amount: 20 mg, Starting Sat 1/28/2023, Until Tue 1/31/2023 at 2359, Normal         CONTINUE these medications which have NOT CHANGED    Details   Ascorbic Acid (vitamin C) 1000 MG tablet Take 1 tablet by mouth daily, Historical Med      ergocalciferol (VITAMIN D2) 50,000 units Take 1 capsule (50,000 Units total) by mouth once a week, Starting Fri 11/5/2021, Normal      omeprazole (PriLOSEC) 40 MG capsule Take 1 capsule (40 mg total) by mouth daily, Starting Wed 1/18/2023, Normal      Omeprazole 20 MG TBDD Take 1 tablet by mouth 2 (two) times a day, Starting Thu 11/17/2022, Normal      sertraline (ZOLOFT) 50 mg tablet TAKE 1 TABLET BY MOUTH EVERYDAY AT BEDTIME, Normal      VITAMIN D PO Take by mouth, Historical Med                 PDMP Review       Value Time User    PDMP Reviewed  Yes 1/28/2023 12:09 PM Kelsey Langford Joycelyn Marquez DO          ED Provider  Electronically Signed by           Leanna Mix DO  01/28/23 2350

## 2023-01-30 ENCOUNTER — TELEPHONE (OUTPATIENT)
Dept: GASTROENTEROLOGY | Facility: CLINIC | Age: 63
End: 2023-01-30

## 2023-01-30 NOTE — TELEPHONE ENCOUNTER
Called and spoke with patient in regards to her gastric emptying study results  Pt voiced understanding  She states that as of right now the omeprazole is working great

## 2023-01-30 NOTE — TELEPHONE ENCOUNTER
----- Message from Leo Mcarthur PA-C sent at 1/27/2023 12:11 PM EST -----  She speaks Senegalese  Please let her know that her stomach emptying test does show delayed emptying which can cause fullness and nausea as well as make it hard to control acid reflux symptoms  We just recently got her approved for the higher dose of omeprazole because she said this helps her so I would like to see how she does on the omeprazole 40 mg daily first but if she is still having persistent symptoms, we could consider adding another medication to help with delayed stomach emptying  Please make her a follow up appt for about 6 weeks from now so we can see how she is doing

## 2023-03-06 RX ORDER — PENICILLIN V POTASSIUM 500 MG/1
TABLET ORAL
COMMUNITY
Start: 2023-01-23 | End: 2023-03-09

## 2023-03-09 ENCOUNTER — OFFICE VISIT (OUTPATIENT)
Dept: GASTROENTEROLOGY | Facility: CLINIC | Age: 63
End: 2023-03-09

## 2023-03-09 VITALS
WEIGHT: 201 LBS | HEIGHT: 63 IN | BODY MASS INDEX: 35.61 KG/M2 | HEART RATE: 87 BPM | DIASTOLIC BLOOD PRESSURE: 80 MMHG | OXYGEN SATURATION: 98 % | RESPIRATION RATE: 16 BRPM | TEMPERATURE: 97.7 F | SYSTOLIC BLOOD PRESSURE: 132 MMHG

## 2023-03-09 DIAGNOSIS — K21.00 GASTROESOPHAGEAL REFLUX DISEASE WITH ESOPHAGITIS WITHOUT HEMORRHAGE: Primary | ICD-10-CM

## 2023-03-09 DIAGNOSIS — R14.0 BLOATING: ICD-10-CM

## 2023-03-09 DIAGNOSIS — K31.84 GASTROPARESIS: ICD-10-CM

## 2023-03-09 DIAGNOSIS — K59.00 CONSTIPATION, UNSPECIFIED CONSTIPATION TYPE: ICD-10-CM

## 2023-03-09 NOTE — PROGRESS NOTES
[facility] - Outpatient Follow-up Note  Krystina Perez 63 y o  female MRN: [id number]  Encounter: [id number]          ASSESSMENT AND PLAN:      1  GERD  2  Gastroparesis  - She has known GERD with a 5 cm hiatal hernia with better control of her reflux symptoms with omeprazole 40 mg daily as opposed to 20 daily  - She still has early satiety which is 2/2 gastroparesis as seen on her recent GES  - We will hold off on reglan for now given her nausea/vomiting have resolved with the higher dose of omeprazole, will opt to try small portions and a low fat/low fiber diet first    3  Bloating  4  Constipation  - She reports generalized bloating and constipation which may correlate with eating less due to her gastroparesis but we will try adding miralax 17 g daily and see if this helps  - Colonoscopy in January 2022 showed left sided diverticulosis, no polyps  - She will let me know in 1 month how she is doing with the miralax; can consider doubling the miralax or consider adding low dose reglan given the gastroparesis  ______________________________________________________________________    SUBJECTIVE:  Ann Marie Montalvo is a 62 yo F presenting for follow-up after she was last seen in January for uncontrolled reflux with episodes of nausea, vomiting, early satiety  She reported that she did  better with omeprazole 40 mg daily rather than 20 mg daily but her insurance was not covering this  we were able to get this covered for her and so she is doing much better in terms of no further heartburn, nausea, or vomiting  Her main issue is that she does feel very full when she eats even if it is a small portion and in general she will feel very bloated and struggling with some constipation  She used to go every day but now will skip 1 day in between bowel movements and feels like she has to go at times but then cannot    She reports that she does not eat very much because of the fullness feeling and this may correlate with the timing of having less frequent bowel movements  REVIEW OF SYSTEMS IS OTHERWISE NEGATIVE  Historical Information   Past Medical History:   Diagnosis Date   • Anxiety    • Depression    • Lymphoma ([address] ) 02/2022   • Right trigger finger      Past Surgical History:   Procedure Laterality Date   • ANKLE HARDWARE REMOVAL     • CHOLECYSTECTOMY     • HYSTERECTOMY     • IR BIOPSY BONE MARROW  1/12/2022   • US GUIDED BREAST BIOPSY RIGHT COMPLETE Right 4/5/2022     Social History   Social History     Substance and Sexual Activity   Alcohol Use Not Currently     Social History     Substance and Sexual Activity   Drug Use Never     Social History     Tobacco Use   Smoking Status Never   Smokeless Tobacco Never     Family History   Problem Relation Age of Onset   • Hypertension Mother    • Diabetes Brother    • Breast cancer Maternal Grandmother 60   • Colon cancer Neg Hx    • Ovarian cancer Neg Hx    • Uterine cancer Neg Hx    • Cervical cancer Neg Hx        Meds/Allergies       Current Outpatient Medications:   •  Ascorbic Acid (vitamin C) 1000 MG tablet  •  omeprazole (PriLOSEC) 40 MG capsule  •  sertraline (ZOLOFT) 50 mg tablet  •  VITAMIN D PO    Allergies   Allergen Reactions   • Benadryl [Diphenhydramine] Anaphylaxis   • Erythromycin Hives           Objective     Blood pressure 132/80, pulse 87, temperature 97 7 °F (36 5 °C), temperature source Temporal, resp  rate 16, height 5' 3" (1 6 m), weight 91 2 kg (201 lb), SpO2 98 %  Body mass index is 35 61 kg/m²  PHYSICAL EXAM:      General Appearance:   Alert, cooperative, no distress   HEENT:   Normocephalic, atraumatic, anicteric      Neck:  Supple, symmetrical, trachea midline   Lungs:   Clear to auscultation bilaterally; no rales, rhonchi or wheezing; respirations unlabored    Heart[de-identified]   Regular rate and rhythm; no murmur, rub, or gallop     Abdomen:   Soft, non-tender, non-distended; normal bowel sounds; no masses, no organomegaly  Genitalia:   Deferred    Rectal:   Deferred    Extremities:  No cyanosis, clubbing or edema    Pulses:  2+ and symmetric    Skin:  No jaundice, rashes, or lesions    Lymph nodes:  No palpable cervical lymphadenopathy        Lab Results:   No visits with results within 1 Day(s) from this visit  Latest known visit with results is:   Office Visit on 11/28/2022   Component Date Value   • SARS-CoV-2 11/28/2022 Negative    • INFLUENZA A PCR 11/28/2022 Positive (A)    • INFLUENZA B PCR 11/28/2022 Negative          Radiology Results:   No results found

## 2023-03-10 ENCOUNTER — TELEPHONE (OUTPATIENT)
Dept: HEMATOLOGY ONCOLOGY | Facility: CLINIC | Age: 63
End: 2023-03-10

## 2023-03-10 NOTE — TELEPHONE ENCOUNTER
Made an attempt to reschedule patients upcoming appointment with Dr Poornima Myers as he is no longer with the practice  Left a voicemail detailing this to patient and instructing to call back to reschedule this visit and arrange a transfer of care

## 2023-03-14 ENCOUNTER — TELEPHONE (OUTPATIENT)
Dept: HEMATOLOGY ONCOLOGY | Facility: CLINIC | Age: 63
End: 2023-03-14

## 2023-03-14 NOTE — TELEPHONE ENCOUNTER
COURTNEY  Route to [facility]                        Patient appointment rescheduled due to Transfer of care      Speaking with   Patient   If you not speaking with the patient, is the person listed on patients Medical Communication Consent?  N/A   Physician patient is established with Dr Eric Silveira appointment date and time 05/02/23 9AM   [facility]   Physician patient is transferring care to   Dr Benton Esqueda appointment date and time 05/17/23 9:20AM   Reason for MCNAIR SikesS  Provider left practice

## 2023-03-31 ENCOUNTER — VBI (OUTPATIENT)
Dept: ADMINISTRATIVE | Facility: OTHER | Age: 63
End: 2023-03-31

## 2023-05-09 ENCOUNTER — APPOINTMENT (OUTPATIENT)
Dept: LAB | Facility: CLINIC | Age: 63
End: 2023-05-09

## 2023-05-09 DIAGNOSIS — C85.80 MARGINAL ZONE LYMPHOMA (HCC): ICD-10-CM

## 2023-05-09 LAB
ALBUMIN SERPL BCP-MCNC: 3.7 G/DL (ref 3.5–5)
ALP SERPL-CCNC: 100 U/L (ref 46–116)
ALT SERPL W P-5'-P-CCNC: 16 U/L (ref 12–78)
ANION GAP SERPL CALCULATED.3IONS-SCNC: 2 MMOL/L (ref 4–13)
AST SERPL W P-5'-P-CCNC: 18 U/L (ref 5–45)
BASOPHILS # BLD AUTO: 0.11 THOUSANDS/ÂΜL (ref 0–0.1)
BASOPHILS NFR BLD AUTO: 1 % (ref 0–1)
BILIRUB SERPL-MCNC: 0.79 MG/DL (ref 0.2–1)
BUN SERPL-MCNC: 19 MG/DL (ref 5–25)
CALCIUM SERPL-MCNC: 9.5 MG/DL (ref 8.3–10.1)
CHLORIDE SERPL-SCNC: 109 MMOL/L (ref 96–108)
CO2 SERPL-SCNC: 26 MMOL/L (ref 21–32)
CREAT SERPL-MCNC: 0.9 MG/DL (ref 0.6–1.3)
EOSINOPHIL # BLD AUTO: 0.14 THOUSAND/ÂΜL (ref 0–0.61)
EOSINOPHIL NFR BLD AUTO: 1 % (ref 0–6)
ERYTHROCYTE [DISTWIDTH] IN BLOOD BY AUTOMATED COUNT: 15.3 % (ref 11.6–15.1)
GFR SERPL CREATININE-BSD FRML MDRD: 69 ML/MIN/1.73SQ M
GLUCOSE P FAST SERPL-MCNC: 98 MG/DL (ref 65–99)
HCT VFR BLD AUTO: 39.7 % (ref 34.8–46.1)
HGB BLD-MCNC: 12 G/DL (ref 11.5–15.4)
IMM GRANULOCYTES # BLD AUTO: 0.03 THOUSAND/UL (ref 0–0.2)
IMM GRANULOCYTES NFR BLD AUTO: 0 % (ref 0–2)
LDH SERPL-CCNC: 158 U/L (ref 81–234)
LYMPHOCYTES # BLD AUTO: 5.12 THOUSANDS/ÂΜL (ref 0.6–4.47)
LYMPHOCYTES NFR BLD AUTO: 43 % (ref 14–44)
MCH RBC QN AUTO: 25.2 PG (ref 26.8–34.3)
MCHC RBC AUTO-ENTMCNC: 30.2 G/DL (ref 31.4–37.4)
MCV RBC AUTO: 83 FL (ref 82–98)
MONOCYTES # BLD AUTO: 0.72 THOUSAND/ÂΜL (ref 0.17–1.22)
MONOCYTES NFR BLD AUTO: 6 % (ref 4–12)
NEUTROPHILS # BLD AUTO: 5.81 THOUSANDS/ÂΜL (ref 1.85–7.62)
NEUTS SEG NFR BLD AUTO: 49 % (ref 43–75)
NRBC BLD AUTO-RTO: 0 /100 WBCS
PLATELET # BLD AUTO: 192 THOUSANDS/UL (ref 149–390)
PMV BLD AUTO: 13.8 FL (ref 8.9–12.7)
POTASSIUM SERPL-SCNC: 4 MMOL/L (ref 3.5–5.3)
PROT SERPL-MCNC: 7 G/DL (ref 6.4–8.4)
RBC # BLD AUTO: 4.76 MILLION/UL (ref 3.81–5.12)
SODIUM SERPL-SCNC: 137 MMOL/L (ref 135–147)
WBC # BLD AUTO: 11.93 THOUSAND/UL (ref 4.31–10.16)

## 2023-05-17 ENCOUNTER — OFFICE VISIT (OUTPATIENT)
Dept: HEMATOLOGY ONCOLOGY | Facility: CLINIC | Age: 63
End: 2023-05-17

## 2023-05-17 VITALS
SYSTOLIC BLOOD PRESSURE: 142 MMHG | OXYGEN SATURATION: 99 % | DIASTOLIC BLOOD PRESSURE: 68 MMHG | HEART RATE: 69 BPM | HEIGHT: 63 IN | BODY MASS INDEX: 35.79 KG/M2 | WEIGHT: 202 LBS

## 2023-05-17 DIAGNOSIS — D72.820 MONOCLONAL B-CELL LYMPHOCYTOSIS WITH IMMUNOPHENOTYPE NOT LIKE CHRONIC LYMPHOCYTIC LEUKEMIA: Primary | ICD-10-CM

## 2023-05-17 DIAGNOSIS — C85.90 STAGE 4 LOW GRADE LYMPHOMA (HCC): ICD-10-CM
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
## 2023-06-06 ENCOUNTER — OFFICE VISIT (OUTPATIENT)
Dept: FAMILY MEDICINE CLINIC | Facility: CLINIC | Age: 63
End: 2023-06-06
Payer: COMMERCIAL

## 2023-06-06 VITALS
DIASTOLIC BLOOD PRESSURE: 72 MMHG | HEART RATE: 66 BPM | SYSTOLIC BLOOD PRESSURE: 138 MMHG | TEMPERATURE: 98 F | OXYGEN SATURATION: 98 % | HEIGHT: 63 IN | BODY MASS INDEX: 35.19 KG/M2 | WEIGHT: 198.6 LBS

## 2023-06-06 DIAGNOSIS — R23.2 HOT FLASHES: Primary | ICD-10-CM

## 2023-06-06 DIAGNOSIS — E78.1 HYPERTRIGLYCERIDEMIA: ICD-10-CM

## 2023-06-06 DIAGNOSIS — Z12.31 VISIT FOR SCREENING MAMMOGRAM: ICD-10-CM

## 2023-06-06 DIAGNOSIS — H93.12 TINNITUS, LEFT EAR: ICD-10-CM

## 2023-06-06 PROBLEM — R07.89 OTHER CHEST PAIN: Status: RESOLVED | Noted: 2022-10-04 | Resolved: 2023-06-06

## 2023-06-06 PROBLEM — H53.9 VISUAL DISTURBANCE: Status: RESOLVED | Noted: 2017-05-03 | Resolved: 2023-06-06

## 2023-06-06 PROBLEM — M19.90 ARTHRITIS: Status: RESOLVED | Noted: 2017-05-03 | Resolved: 2023-06-06

## 2023-06-06 PROBLEM — R51.9 HEADACHE: Status: RESOLVED | Noted: 2017-05-03 | Resolved: 2023-06-06

## 2023-06-06 PROCEDURE — 99214 OFFICE O/P EST MOD 30 MIN: CPT | Performed by: FAMILY MEDICINE

## 2023-06-06 RX ORDER — PAROXETINE 10 MG/1
10 TABLET, FILM COATED ORAL DAILY
Qty: 30 TABLET | Refills: 2 | Status: SHIPPED | OUTPATIENT
Start: 2023-06-06 | End: 2023-07-06

## 2023-06-06 RX ORDER — PAROXETINE 7.5 MG/1
1 CAPSULE ORAL
Qty: 30 CAPSULE | Refills: 2 | Status: SHIPPED | OUTPATIENT
Start: 2023-06-06 | End: 2023-06-06

## 2023-06-07 ENCOUNTER — HOSPITAL ENCOUNTER (OUTPATIENT)
Dept: MAMMOGRAPHY | Facility: CLINIC | Age: 63
Discharge: HOME | End: 2023-06-07
Payer: COMMERCIAL

## 2023-06-07 VITALS — HEIGHT: 63 IN | WEIGHT: 198 LBS | BODY MASS INDEX: 35.08 KG/M2

## 2023-06-07 DIAGNOSIS — Z12.31 VISIT FOR SCREENING MAMMOGRAM: ICD-10-CM

## 2023-06-07 DIAGNOSIS — Z12.31 ENCOUNTER FOR SCREENING MAMMOGRAM FOR MALIGNANT NEOPLASM OF BREAST: ICD-10-CM

## 2023-06-07 PROCEDURE — 77067 SCR MAMMO BI INCL CAD: CPT

## 2023-06-07 PROCEDURE — 77063 BREAST TOMOSYNTHESIS BI: CPT

## 2023-06-08 DIAGNOSIS — R92.8 ABNORMALITY OF LEFT BREAST ON SCREENING MAMMOGRAPHY: Primary | ICD-10-CM

## 2023-06-12 ENCOUNTER — APPOINTMENT (OUTPATIENT)
Dept: LAB | Facility: CLINIC | Age: 63
End: 2023-06-12
Payer: COMMERCIAL

## 2023-06-12 DIAGNOSIS — E78.1 HYPERTRIGLYCERIDEMIA: ICD-10-CM

## 2023-06-12 PROCEDURE — 36415 COLL VENOUS BLD VENIPUNCTURE: CPT

## 2023-06-12 PROCEDURE — 80061 LIPID PANEL: CPT

## 2023-06-13 LAB
CHOLEST SERPL-MCNC: 172 MG/DL
HDLC SERPL-MCNC: 42 MG/DL
LDLC SERPL CALC-MCNC: 89 MG/DL (ref 0–100)
NONHDLC SERPL-MCNC: 130 MG/DL
TRIGL SERPL-MCNC: 205 MG/DL

## 2023-06-23 ENCOUNTER — VBI (OUTPATIENT)
Dept: ADMINISTRATIVE | Facility: OTHER | Age: 63
End: 2023-06-23

## 2023-07-19 DIAGNOSIS — K21.00 GASTROESOPHAGEAL REFLUX DISEASE WITH ESOPHAGITIS WITHOUT HEMORRHAGE: ICD-10-CM

## 2023-07-19 RX ORDER — OMEPRAZOLE 40 MG/1
40 CAPSULE, DELAYED RELEASE ORAL DAILY
Qty: 30 CAPSULE | Refills: 2 | Status: SHIPPED | OUTPATIENT
Start: 2023-07-19

## 2023-08-11 ENCOUNTER — HOSPITAL ENCOUNTER (OUTPATIENT)
Dept: MAMMOGRAPHY | Facility: CLINIC | Age: 63
End: 2023-08-11
Payer: COMMERCIAL

## 2023-08-11 ENCOUNTER — HOSPITAL ENCOUNTER (OUTPATIENT)
Dept: ULTRASOUND IMAGING | Facility: CLINIC | Age: 63
End: 2023-08-11
Payer: COMMERCIAL

## 2023-08-11 DIAGNOSIS — R92.8 ABNORMALITY OF LEFT BREAST ON SCREENING MAMMOGRAPHY: ICD-10-CM

## 2023-08-11 PROCEDURE — 76642 ULTRASOUND BREAST LIMITED: CPT

## 2023-08-11 NOTE — PROGRESS NOTES
Met with patient and    regarding recommendation for;    _____ RIGHT ____X__LEFT      _____Ultrasound guided  ______Stereotactic breast axillary lymph node biopsy. __X___Verbalized understanding.       Blood thinners:  No: __X__ Yes: ______ What:                 Biopsy teaching sheet given:  Yes: ___X___ No: ________    Pt given contact information and adv to call with any questions/needs

## 2023-09-06 DIAGNOSIS — R23.2 HOT FLASHES: ICD-10-CM

## 2023-09-06 RX ORDER — PAROXETINE 10 MG/1
10 TABLET, FILM COATED ORAL DAILY
Qty: 30 TABLET | Refills: 2 | Status: SHIPPED | OUTPATIENT
Start: 2023-09-06

## 2023-09-08 ENCOUNTER — HOSPITAL ENCOUNTER (OUTPATIENT)
Dept: ULTRASOUND IMAGING | Facility: CLINIC | Age: 63
End: 2023-09-08
Payer: COMMERCIAL

## 2023-09-08 VITALS — SYSTOLIC BLOOD PRESSURE: 131 MMHG | HEART RATE: 92 BPM | DIASTOLIC BLOOD PRESSURE: 89 MMHG

## 2023-09-08 DIAGNOSIS — R92.8 ABNORMAL MAMMOGRAM: ICD-10-CM

## 2023-09-08 PROCEDURE — 88341 IMHCHEM/IMCYTCHM EA ADD ANTB: CPT | Performed by: STUDENT IN AN ORGANIZED HEALTH CARE EDUCATION/TRAINING PROGRAM

## 2023-09-08 PROCEDURE — 88305 TISSUE EXAM BY PATHOLOGIST: CPT | Performed by: STUDENT IN AN ORGANIZED HEALTH CARE EDUCATION/TRAINING PROGRAM

## 2023-09-08 PROCEDURE — 88184 FLOWCYTOMETRY/ TC 1 MARKER: CPT | Performed by: FAMILY MEDICINE

## 2023-09-08 PROCEDURE — 88364 INSITU HYBRIDIZATION (FISH): CPT | Performed by: STUDENT IN AN ORGANIZED HEALTH CARE EDUCATION/TRAINING PROGRAM

## 2023-09-08 PROCEDURE — 88342 IMHCHEM/IMCYTCHM 1ST ANTB: CPT | Performed by: STUDENT IN AN ORGANIZED HEALTH CARE EDUCATION/TRAINING PROGRAM

## 2023-09-08 PROCEDURE — 88365 INSITU HYBRIDIZATION (FISH): CPT | Performed by: STUDENT IN AN ORGANIZED HEALTH CARE EDUCATION/TRAINING PROGRAM

## 2023-09-08 PROCEDURE — 88185 FLOWCYTOMETRY/TC ADD-ON: CPT

## 2023-09-08 PROCEDURE — 76942 ECHO GUIDE FOR BIOPSY: CPT

## 2023-09-08 PROCEDURE — 88360 TUMOR IMMUNOHISTOCHEM/MANUAL: CPT | Performed by: STUDENT IN AN ORGANIZED HEALTH CARE EDUCATION/TRAINING PROGRAM

## 2023-09-08 PROCEDURE — 38505 NEEDLE BIOPSY LYMPH NODES: CPT

## 2023-09-08 RX ORDER — LIDOCAINE HYDROCHLORIDE 10 MG/ML
5 INJECTION, SOLUTION EPIDURAL; INFILTRATION; INTRACAUDAL; PERINEURAL ONCE
Status: COMPLETED | OUTPATIENT
Start: 2023-09-08 | End: 2023-09-08

## 2023-09-08 RX ADMIN — LIDOCAINE HYDROCHLORIDE 5 ML: 10 INJECTION, SOLUTION EPIDURAL; INFILTRATION; INTRACAUDAL; PERINEURAL at 13:52

## 2023-09-08 NOTE — PROGRESS NOTES
Procedure type:    __x___ultrasound guided _____stereotactic    Breast:    __x___Left _____Right    Location: axilla    Needle: 12 gauge marian    # of passes: 5 ( 3 for flow cytometry and 2 for surgical specimen )    Clip: Uriel Ahmadi    Performed by: Dr. Elliott Christensen held for 5 minutes by: Addie Carias Strips:    ___x__yes _____no    Band aid:    __x___yes_____no    Tape and guaze:    _____yes __x___no    Tolerated procedure:    ___x__yes _____no

## 2023-09-08 NOTE — PROGRESS NOTES
Ice pack given:    ___x__yes _____no    Discharge instructions signed by patient:    _____yes ___x__no    Discharge instructions given to patient:    ___x__yes _____no    Discharged via:    __x___ambulatory    _____wheelchair    _____stretcher    Stable on discharge:    ___x__yes ____no  Patient would like results over the phone. Ok to leave a message.

## 2023-09-11 LAB — SCAN RESULT: NORMAL

## 2023-09-11 NOTE — PROGRESS NOTES
Post procedure call completed    Bleeding: _____yes __X___no, pt denies    Pain: _____yes ___X___no, pt had mild discomfort, did not need to take any analgesics    Redness/Swelling: ______yes ___X___no, pt denies    Band aid removed: ___X__yes _____no     Steri-Strips intact: ______yes ___X__no     Pt with no questions at this time, adv will call when results available, adv to call with any questions or concerns, has name/# for contact

## 2023-09-12 ENCOUNTER — OFFICE VISIT (OUTPATIENT)
Dept: FAMILY MEDICINE CLINIC | Facility: CLINIC | Age: 63
End: 2023-09-12
Payer: COMMERCIAL

## 2023-09-12 VITALS
DIASTOLIC BLOOD PRESSURE: 84 MMHG | SYSTOLIC BLOOD PRESSURE: 126 MMHG | TEMPERATURE: 97.8 F | HEIGHT: 63 IN | HEART RATE: 80 BPM | WEIGHT: 192.4 LBS | OXYGEN SATURATION: 98 % | BODY MASS INDEX: 34.09 KG/M2

## 2023-09-12 DIAGNOSIS — C85.90 STAGE 4 LOW GRADE LYMPHOMA (HCC): Primary | ICD-10-CM

## 2023-09-12 DIAGNOSIS — R23.2 HOT FLASHES: ICD-10-CM

## 2023-09-12 PROBLEM — R74.8 ELEVATED LIVER ENZYMES: Status: RESOLVED | Noted: 2022-08-31 | Resolved: 2023-09-12

## 2023-09-12 PROBLEM — F07.81 POST CONCUSSION SYNDROME: Status: RESOLVED | Noted: 2017-03-16 | Resolved: 2023-09-12

## 2023-09-12 PROBLEM — H92.02 DISCOMFORT OF LEFT EAR: Status: RESOLVED | Noted: 2022-08-31 | Resolved: 2023-09-12

## 2023-09-12 PROBLEM — R10.84 GENERALIZED ABDOMINAL PAIN: Status: RESOLVED | Noted: 2022-11-17 | Resolved: 2023-09-12

## 2023-09-12 PROBLEM — S06.0X9A CONCUSSION WITH BRIEF (LESS THAN ONE HOUR) LOSS OF CONSCIOUSNESS: Status: RESOLVED | Noted: 2017-01-26 | Resolved: 2023-09-12

## 2023-09-12 PROCEDURE — 99213 OFFICE O/P EST LOW 20 MIN: CPT | Performed by: FAMILY MEDICINE

## 2023-09-12 RX ORDER — CHLORAL HYDRATE 500 MG
1000 CAPSULE ORAL DAILY
COMMUNITY

## 2023-09-12 NOTE — PROGRESS NOTES
Name: Sabrina Dior      : 1960      MRN: [id number]  Encounter Provider: Enma Eckert MD  Encounter Date: 2023   Encounter department: [address] [place] [address]     1. Stage 4 low grade lymphoma ([address] [facility])  Will contact Jonnathan Rivera/Onc to make him aware of findings. 2. Hot flashes  Symptoms improved    Follow up in 6 months or as needed         Subjective     Patient is here to follow up. She has been taking Paxil for hot flashes which have been helping her. Also has a Hx of lymphoma sees Dr. Antonette Zambrano. Had a left axillary lymph node biopsy done due to abnormal mammography. She is not sure what the next steps are. Review of Systems   Constitutional: Negative for activity change, appetite change, fatigue and fever. HENT: Negative for congestion and ear discharge. Respiratory: Negative for cough and shortness of breath. Cardiovascular: Negative for chest pain and palpitations. Gastrointestinal: Negative for diarrhea and nausea. Musculoskeletal: Negative for arthralgias and back pain. Skin: Negative for color change and rash. Neurological: Negative for dizziness and headaches. Psychiatric/Behavioral: Negative for agitation and behavioral problems.        Past Medical History:   Diagnosis Date   • Anxiety    • Depression    • Lymphoma ([address]) 2022   • Right trigger finger      Past Surgical History:   Procedure Laterality Date   • ANKLE HARDWARE REMOVAL     • CHOLECYSTECTOMY     • HYSTERECTOMY     • IR BIOPSY BONE MARROW  2022   • US GUIDED BREAST BIOPSY RIGHT COMPLETE Right 2022   • US GUIDED BREAST LYMPH NODE BIOPSY LEFT Left 2023     Family History   Problem Relation Age of Onset   • Hypertension Mother    • Breast cancer Maternal Grandmother         unknown age   • Diabetes Brother    • Colon cancer Neg Hx    • Ovarian cancer Neg Hx    • Uterine cancer Neg Hx    • Cervical cancer Neg Hx      Social History     Socioeconomic History • Marital status: Legally      Spouse name: None   • Number of children: None   • Years of education: None   • Highest education level: None   Occupational History   • None   Tobacco Use   • Smoking status: Never   • Smokeless tobacco: Never   Vaping Use   • Vaping Use: Never used   Substance and Sexual Activity   • Alcohol use: Not Currently   • Drug use: Never   • Sexual activity: Not Currently     Partners: Male   Other Topics Concern   • None   Social History Narrative   • None     Social Determinants of Health     Financial Resource Strain: Not on file   Food Insecurity: Not on file   Transportation Needs: Not on file   Physical Activity: Not on file   Stress: Not on file   Social Connections: Not on file   Intimate Partner Violence: Not on file   Housing Stability: Not on file     Current Outpatient Medications on File Prior to Visit   Medication Sig   • Ascorbic Acid (vitamin C) 1000 MG tablet Take 1 tablet by mouth daily   • Multiple Vitamin (multivitamin stress formula) Take 1 tablet by mouth daily   • Omega-3 Fatty Acids (fish oil) 1,000 mg Take 1,000 mg by mouth daily   • omeprazole (PriLOSEC) 40 MG capsule TAKE 1 CAPSULE (40 MG TOTAL) BY MOUTH DAILY.    • PARoxetine (PAXIL) 10 mg tablet TAKE 1 TABLET BY MOUTH EVERY DAY   • VITAMIN D PO Take by mouth     Allergies   Allergen Reactions   • Benadryl [Diphenhydramine] Anaphylaxis   • Erythromycin Hives     Immunization History   Administered Date(s) Administered   • COVID-19 Pfizer vac (Reymundo-sucrose, gray cap) 12 yr+ IM 02/18/2022, 03/11/2022   • Influenza, recombinant, quadrivalent,injectable, preservative free 10/22/2021   • Pneumococcal Polysaccharide PPV23 11/05/2021       Objective     /84 (BP Location: Left arm, Patient Position: Sitting)   Pulse 80   Temp 97.8 °F (36.6 °C) (Temporal)   Ht 5' 3" (1.6 m)   Wt 87.3 kg (192 lb 6.4 oz)   SpO2 98%   BMI 34.08 kg/m²     Physical Exam  Constitutional:       General: She is not in acute distress. Appearance: She is well-developed. She is not diaphoretic. HENT:      Head: Normocephalic and atraumatic. Nose: Nose normal.   Eyes:      Conjunctiva/sclera: Conjunctivae normal.      Pupils: Pupils are equal, round, and reactive to light. Cardiovascular:      Rate and Rhythm: Normal rate and regular rhythm. Heart sounds: Normal heart sounds. No murmur heard. Pulmonary:      Effort: Pulmonary effort is normal. No respiratory distress. Breath sounds: Normal breath sounds. No wheezing. Abdominal:      General: Bowel sounds are normal. There is no distension. Palpations: Abdomen is soft. Tenderness: There is no abdominal tenderness. Skin:     General: Skin is warm and dry. Findings: No erythema or rash. Neurological:      Mental Status: She is alert and oriented to person, place, and time.        Florencia Eastman MD

## 2023-09-20 ENCOUNTER — TELEPHONE (OUTPATIENT)
Dept: MAMMOGRAPHY | Facility: CLINIC | Age: 63
End: 2023-09-20

## 2023-09-20 PROCEDURE — 88342 IMHCHEM/IMCYTCHM 1ST ANTB: CPT | Performed by: STUDENT IN AN ORGANIZED HEALTH CARE EDUCATION/TRAINING PROGRAM

## 2023-09-20 PROCEDURE — 88360 TUMOR IMMUNOHISTOCHEM/MANUAL: CPT | Performed by: STUDENT IN AN ORGANIZED HEALTH CARE EDUCATION/TRAINING PROGRAM

## 2023-09-20 PROCEDURE — 88341 IMHCHEM/IMCYTCHM EA ADD ANTB: CPT | Performed by: STUDENT IN AN ORGANIZED HEALTH CARE EDUCATION/TRAINING PROGRAM

## 2023-09-20 PROCEDURE — 88305 TISSUE EXAM BY PATHOLOGIST: CPT | Performed by: STUDENT IN AN ORGANIZED HEALTH CARE EDUCATION/TRAINING PROGRAM

## 2023-09-20 PROCEDURE — 88364 INSITU HYBRIDIZATION (FISH): CPT | Performed by: STUDENT IN AN ORGANIZED HEALTH CARE EDUCATION/TRAINING PROGRAM

## 2023-09-20 PROCEDURE — 88365 INSITU HYBRIDIZATION (FISH): CPT | Performed by: STUDENT IN AN ORGANIZED HEALTH CARE EDUCATION/TRAINING PROGRAM

## 2023-10-13 ENCOUNTER — TELEPHONE (OUTPATIENT)
Dept: HEMATOLOGY ONCOLOGY | Facility: CLINIC | Age: 63
End: 2023-10-13

## 2023-10-13 NOTE — TELEPHONE ENCOUNTER
I called [facility] regarding an appointment that they have scheduled with Dr. Michele Lopez scheduled on  11/22/23      I left a voicemail explaining to patient that this appointment will need to be rescheduled due to a change in the providers schedule. Patient was advised to call [facility] [place] to reschedule.   Another attempt will be made to reschedule    Reschedule COURTNEY with [facility] provider

## 2023-10-16 ENCOUNTER — TELEPHONE (OUTPATIENT)
Dept: HEMATOLOGY ONCOLOGY | Facility: CLINIC | Age: 63
End: 2023-10-16

## 2023-10-16 NOTE — TELEPHONE ENCOUNTER
COURTNEY  DR antoine EDWARDS   Who are you speaking with? Patient   If it is not the patient, are they listed on an active communication consent form? N/A   Is this a COURTNEY or DR antoine EDWARDS COURTNEY   Which provider is patient currently scheduled or established with? Dr. Jomar Yao   What is the original appointment date and time? 11/22/23 @ 9   At which location is the appointment scheduled to take [facility]? [facility]   Which provider is the patient transitioning care to? Dr. Derek Tatum   What is the new appointment date and time? 12/5/23 @ 140   At which location is the new appointment scheduled to take place[facility]   What is the reason for this change?  Provider

## 2023-11-06 DIAGNOSIS — K21.00 GASTROESOPHAGEAL REFLUX DISEASE WITH ESOPHAGITIS WITHOUT HEMORRHAGE: ICD-10-CM

## 2023-11-06 RX ORDER — OMEPRAZOLE 40 MG/1
40 CAPSULE, DELAYED RELEASE ORAL DAILY
Qty: 30 CAPSULE | Refills: 2 | Status: SHIPPED | OUTPATIENT
Start: 2023-11-06

## 2023-11-27 ENCOUNTER — APPOINTMENT (OUTPATIENT)
Dept: LAB | Facility: CLINIC | Age: 63
End: 2023-11-27
Payer: COMMERCIAL

## 2023-11-27 DIAGNOSIS — D72.820 MONOCLONAL B-CELL LYMPHOCYTOSIS WITH IMMUNOPHENOTYPE NOT LIKE CHRONIC LYMPHOCYTIC LEUKEMIA: ICD-10-CM

## 2023-11-27 LAB
ALBUMIN SERPL BCP-MCNC: 4.3 G/DL (ref 3.5–5)
ALP SERPL-CCNC: 92 U/L (ref 34–104)
ALT SERPL W P-5'-P-CCNC: 11 U/L (ref 7–52)
ANION GAP SERPL CALCULATED.3IONS-SCNC: 6 MMOL/L
AST SERPL W P-5'-P-CCNC: 16 U/L (ref 13–39)
BASOPHILS # BLD MANUAL: 0.12 THOUSAND/UL (ref 0–0.1)
BASOPHILS NFR MAR MANUAL: 1 % (ref 0–1)
BILIRUB SERPL-MCNC: 1.02 MG/DL (ref 0.2–1)
BUN SERPL-MCNC: 17 MG/DL (ref 5–25)
CALCIUM SERPL-MCNC: 10 MG/DL (ref 8.4–10.2)
CHLORIDE SERPL-SCNC: 104 MMOL/L (ref 96–108)
CO2 SERPL-SCNC: 30 MMOL/L (ref 21–32)
CREAT SERPL-MCNC: 0.75 MG/DL (ref 0.6–1.3)
EOSINOPHIL # BLD MANUAL: 0 THOUSAND/UL (ref 0–0.4)
EOSINOPHIL NFR BLD MANUAL: 0 % (ref 0–6)
ERYTHROCYTE [DISTWIDTH] IN BLOOD BY AUTOMATED COUNT: 14.8 % (ref 11.6–15.1)
GFR SERPL CREATININE-BSD FRML MDRD: 86 ML/MIN/1.73SQ M
GLUCOSE P FAST SERPL-MCNC: 92 MG/DL (ref 65–99)
HCT VFR BLD AUTO: 41.3 % (ref 34.8–46.1)
HGB BLD-MCNC: 12.4 G/DL (ref 11.5–15.4)
LYMPHOCYTES # BLD AUTO: 43 % (ref 14–44)
LYMPHOCYTES # BLD AUTO: 5.76 THOUSAND/UL (ref 0.6–4.47)
MCH RBC QN AUTO: 25.3 PG (ref 26.8–34.3)
MCHC RBC AUTO-ENTMCNC: 30 G/DL (ref 31.4–37.4)
MCV RBC AUTO: 84 FL (ref 82–98)
MONOCYTES # BLD AUTO: 0.96 THOUSAND/UL (ref 0–1.22)
MONOCYTES NFR BLD: 8 % (ref 4–12)
NEUTROPHILS # BLD MANUAL: 5.16 THOUSAND/UL (ref 1.85–7.62)
NEUTS BAND NFR BLD MANUAL: 1 % (ref 0–8)
NEUTS SEG NFR BLD AUTO: 42 % (ref 43–75)
PLATELET # BLD AUTO: 213 THOUSANDS/UL (ref 149–390)
PLATELET BLD QL SMEAR: ADEQUATE
PMV BLD AUTO: 14.4 FL (ref 8.9–12.7)
POTASSIUM SERPL-SCNC: 4.6 MMOL/L (ref 3.5–5.3)
PROT SERPL-MCNC: 6.8 G/DL (ref 6.4–8.4)
RBC # BLD AUTO: 4.9 MILLION/UL (ref 3.81–5.12)
RBC MORPH BLD: NORMAL
SODIUM SERPL-SCNC: 140 MMOL/L (ref 135–147)
VARIANT LYMPHS # BLD AUTO: 5 %
WBC # BLD AUTO: 12 THOUSAND/UL (ref 4.31–10.16)

## 2023-11-27 PROCEDURE — 80053 COMPREHEN METABOLIC PANEL: CPT

## 2023-11-27 PROCEDURE — 85007 BL SMEAR W/DIFF WBC COUNT: CPT

## 2023-11-27 PROCEDURE — 85027 COMPLETE CBC AUTOMATED: CPT

## 2023-11-27 PROCEDURE — 36415 COLL VENOUS BLD VENIPUNCTURE: CPT

## 2023-12-04 DIAGNOSIS — R23.2 HOT FLASHES: ICD-10-CM

## 2023-12-04 RX ORDER — PAROXETINE 10 MG/1
10 TABLET, FILM COATED ORAL DAILY
Qty: 30 TABLET | Refills: 2 | Status: SHIPPED | OUTPATIENT
Start: 2023-12-04

## 2023-12-05 ENCOUNTER — OFFICE VISIT (OUTPATIENT)
Dept: HEMATOLOGY ONCOLOGY | Facility: CLINIC | Age: 63
End: 2023-12-05
Payer: COMMERCIAL

## 2023-12-05 VITALS
BODY MASS INDEX: 35.08 KG/M2 | TEMPERATURE: 98 F | RESPIRATION RATE: 16 BRPM | HEART RATE: 61 BPM | HEIGHT: 63 IN | WEIGHT: 198 LBS | SYSTOLIC BLOOD PRESSURE: 124 MMHG | OXYGEN SATURATION: 98 % | DIASTOLIC BLOOD PRESSURE: 86 MMHG

## 2023-12-05 DIAGNOSIS — C91.10 CLL (CHRONIC LYMPHOCYTIC LEUKEMIA) (HCC): Primary | ICD-10-CM

## 2023-12-05 PROCEDURE — 99214 OFFICE O/P EST MOD 30 MIN: CPT | Performed by: INTERNAL MEDICINE

## 2023-12-05 NOTE — PROGRESS NOTES
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 Axilla, lymph node, left, biopsy:  -   Chronic lymphocytic leukemia/small lymphocytic lymphoma (CLL/SLL), see comment.   -   Negative for carcinoma. Comment: The patient's history of axillary lymphadenopathy and low grade B-cell lymphoma identified in the bone marrow are noted. The current sample shows a CD5+ B-cell lymphoma with expression of CD23 and LEF1 with negative BCL-1 and SOX-11 staining supports a diagnosis of chronic lymphocytic leukemia/small lymphocytic lymphoma (CLL/SLL). Flow cytometry reveals a clonal B-cell population with dim to partial expression of CD5 and CD23. Prognostic CLL FISH identifies persistence of the patient's trisomy 12 (intermediate risk), which was previously identified in the patient's bone marrow biopsy ([id number]). Cyclin D1 FISH is negative. There is no evidence of disease progression by FISH analysis. IgVH mutations analysis shows a [id number]_[id number] mutation with a mutation rate of 5.75%, confirming a favorable prognostic mutated CLL. Lymphoid Next generation sequencing is pending and will be reported as a separate addendum. Follow-up with patient's hematology/oncology provider is recommended. Oncology History   Stage 4 low grade lymphoma ([address])   1/12/2022 Initial Diagnosis    October 2021 patient had a white count showing 13.7, hemoglobin 8.5, MCV 70, platelet count 932, normal differential.  Absolute lymphocytes 5.7. Iron saturation 5%. November 2021 flow cytometry showed MBL, nonspecific phenotype, clonal B cells 2.1. January 12, 2022 bone marrow biopsy showed low-grade B-cell lymphoma, nonspecific phenotype, 30-35% of 65-70% cellular marrow.   [employer] sequencing showed no mutations. Karyotype 52 XX trisomy 15. Otherwise normal maturation. INTERIM HISTORY:  The patient presents for a follow-up. No new complaints. Reports feeling tired lately-- started 2 weeks ago. Still able to do all daily activities. Not sleeping through the night-- has a hard time falling asleep. Sometimes she falls asleep at 1am. Wakes up tired. No other B symptoms.      PAST MEDICAL,PAST SURGICAL, FAMILY AND SOCIAL HISTORY:    Patient Active Problem List   Diagnosis    Gastroesophageal reflux disease    Stage 4 low grade lymphoma (HCC)    History of iron deficiency anemia    Anxiety and depression    Acquired pes planus    Cervical transverse process fracture, subsequent encounter    Closed displaced fracture of lateral malleolus of left fibula    Closed fracture of medial malleolus of left tibia    Detached retina, left    Heartburn    Left renal mass    Optic neuropathy, left    Kidney cysts    Hot flashes    Tinnitus, left ear     Past Medical History:   Diagnosis Date    Anxiety     Depression     Lymphoma ([address]) 02/2022    Right trigger finger      Past Surgical History:   Procedure Laterality Date    ANKLE HARDWARE REMOVAL      CHOLECYSTECTOMY      HYSTERECTOMY      IR BIOPSY BONE MARROW  1/12/2022    US GUIDED BREAST BIOPSY RIGHT COMPLETE Right 4/5/2022    US GUIDED BREAST LYMPH NODE BIOPSY LEFT Left 9/8/2023     Family History   Problem Relation Age of Onset    Hypertension Mother     Breast cancer Maternal Grandmother         unknown age    Diabetes Brother     Colon cancer Neg Hx     Ovarian cancer Neg Hx     Uterine cancer Neg Hx     Cervical cancer Neg Hx      Social History     Socioeconomic History    Marital status: Legally      Spouse name: Not on file    Number of children: Not on file    Years of education: Not on file    Highest education level: Not on file   Occupational History    Not on file   Tobacco Use    Smoking status: Never    Smokeless tobacco: Never   Vaping Use    Vaping Use: Never used   Substance and Sexual Activity    Alcohol use: Not Currently    Drug use: Never    Sexual activity: Not Currently     Partners: Male   Other Topics Concern    Not on file   Social History Narrative    Not on file     Social Determinants of Health     Financial Resource Strain: Not on file   Food Insecurity: Not on file   Transportation Needs: Not on file   Physical Activity: Not on file   Stress: Not on file   Social Connections: Not on file   Intimate Partner Violence: Not on file   Housing Stability: Not on file       Current Outpatient Medications:     Ascorbic Acid (vitamin C) 1000 MG tablet, Take 1 tablet by mouth daily, Disp: , Rfl:     Omega-3 Fatty Acids (fish oil) 1,000 mg, Take 1,000 mg by mouth daily, Disp: , Rfl:     omeprazole (PriLOSEC) 40 MG capsule, TAKE 1 CAPSULE (40 MG TOTAL) BY MOUTH DAILY. , Disp: 30 capsule, Rfl: 2    PARoxetine (PAXIL) 10 mg tablet, TAKE 1 TABLET BY MOUTH EVERY DAY (Patient taking differently: Take 10 mg by mouth if needed), Disp: 30 tablet, Rfl: 2    VITAMIN D PO, Take by mouth, Disp: , Rfl:     Multiple Vitamin (multivitamin stress formula), Take 1 tablet by mouth daily (Patient not taking: Reported on 12/5/2023), Disp: , Rfl:   Allergies   Allergen Reactions    Benadryl [Diphenhydramine] Anaphylaxis    Erythromycin Hives     Vitals:    12/05/23 1317   BP: 124/86   Pulse: 61   Resp: 16   Temp: 98 °F (36.7 °C)   SpO2: 98%       ROS:  CONSTITUTIONAL:  No fever. No chills. No dizziness. No weakness. EYES:  No pain, erythema, or discharge. No blurring of vision. ENT:  No sore throat, URI symptoms. No epistaxis. No tinnitus. CARDIOVASCULAR:  No chest pain. No palpitations. No lower extremity edema. RESPIRATORY:  No shortness of breath, cough, pain with respiration, pleuritic chest pain. No hemoptysis. No dyspnea. No paroxysmal nocturnal dyspnea. GASTROINTESTINAL:  Normal appetite. No nausea, vomiting, diarrhea. No pain.  No bloating. No melena. GENITOURINARY:  No frequency, urgency, nocturia. No hematuria or dysuria. MUSCULOSKELETAL:  No arthralgias or myalgias. INTEGUMENTARY:  No swelling. No bruising. No contusions. No abrasions. No lymphangitis. NEUROLOGIC:  No headache. No neck pain. No numbness or tingling of the extremities. No weakness. PSYCHIATRIC:  No confusion. ENDOCRINE:  No fatigue. No weakness. No history of thyroid, diabetes or adrenal problems. HEMATOLOGICAL:  No bleeding. No petechiae. No bruising.     PHYSICAL EXAM:    GENERAL: AAO x 3  HEENT: AT,NC  CVS: S1S2 RRR  LUNGS: CTA b/l  ABD: NT,ND, +BS  EXTR: no edema  NEURO: CN II-XII grossly intact    LABS:  I have reviewed pertinent labs:  CBC:   Lab Results   Component Value Date    WBC 12.00 (H) 11/27/2023    RBC 4.90 11/27/2023    HGB 12.4 11/27/2023    HCT 41.3 11/27/2023    MCV 84 11/27/2023     11/27/2023    MCH 25.3 (L) 11/27/2023    MCHC 30.0 (L) 11/27/2023    RDW 14.8 11/27/2023    MPV 14.4 (H) 11/27/2023    NEUTROABS 5.81 05/09/2023     CMP:   Lab Results   Component Value Date    SODIUM 140 11/27/2023    K 4.6 11/27/2023     11/27/2023    CO2 30 11/27/2023    AGAP 6 11/27/2023    BUN 17 11/27/2023    CREATININE 0.75 11/27/2023    GLUC 100 08/31/2022    GLUF 92 11/27/2023    CALCIUM 10.0 11/27/2023    AST 16 11/27/2023    ALT 11 11/27/2023    ALKPHOS 92 11/27/2023    TP 6.8 11/27/2023    ALB 4.3 11/27/2023    TBILI 1.02 (H) 11/27/2023    EGFR 86 11/27/2023     Liver Enzymes:   Lab Results   Component Value Date    AST 16 11/27/2023    ALT 11 11/27/2023    ALKPHOS 92 11/27/2023    TP 6.8 11/27/2023    ALB 4.3 11/27/2023    TBILI 1.02 (H) 11/27/2023     Vitamin B12   Lab Results   Component Value Date    MVMYSBDY94 1,139 (H) 11/30/2021     Iron Study   Lab Results   Component Value Date    FERRITIN 61 04/18/2022    CONCFE 21 04/18/2022    TIBC 351 04/18/2022    IRON 74 04/18/2022     Folate   Lab Results   Component Value Date    FOLATE 15.7 11/30/2021     Magnesium No results found for: "MG"  Phosphorus No results found for: "PHOS"  Coagulation Panel   Lab Results   Component Value Date    DDIMER 2.12 (H) 08/28/2022       IMAGING:  No results found. I reviewed the above laboratory and imaging data. 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 Labs stable. Continue surveillance. Imaging from 2022 reviewed. F/u in 6m. Labs Q3m.

## 2023-12-06 ENCOUNTER — APPOINTMENT (OUTPATIENT)
Dept: LAB | Facility: CLINIC | Age: 63
End: 2023-12-06
Payer: COMMERCIAL

## 2023-12-06 LAB
ALBUMIN SERPL BCP-MCNC: 4.3 G/DL (ref 3.5–5)
ALP SERPL-CCNC: 88 U/L (ref 34–104)
ALT SERPL W P-5'-P-CCNC: 9 U/L (ref 7–52)
ANION GAP SERPL CALCULATED.3IONS-SCNC: 6 MMOL/L
AST SERPL W P-5'-P-CCNC: 13 U/L (ref 13–39)
BASOPHILS # BLD AUTO: 0.1 THOUSANDS/ÂΜL (ref 0–0.1)
BASOPHILS NFR BLD AUTO: 1 % (ref 0–1)
BILIRUB SERPL-MCNC: 0.94 MG/DL (ref 0.2–1)
BUN SERPL-MCNC: 20 MG/DL (ref 5–25)
CALCIUM SERPL-MCNC: 9.9 MG/DL (ref 8.4–10.2)
CHLORIDE SERPL-SCNC: 106 MMOL/L (ref 96–108)
CO2 SERPL-SCNC: 30 MMOL/L (ref 21–32)
CREAT SERPL-MCNC: 0.85 MG/DL (ref 0.6–1.3)
EOSINOPHIL # BLD AUTO: 0.14 THOUSAND/ÂΜL (ref 0–0.61)
EOSINOPHIL NFR BLD AUTO: 1 % (ref 0–6)
ERYTHROCYTE [DISTWIDTH] IN BLOOD BY AUTOMATED COUNT: 14.6 % (ref 11.6–15.1)
GFR SERPL CREATININE-BSD FRML MDRD: 74 ML/MIN/1.73SQ M
GLUCOSE P FAST SERPL-MCNC: 98 MG/DL (ref 65–99)
HCT VFR BLD AUTO: 40.1 % (ref 34.8–46.1)
HGB BLD-MCNC: 12.6 G/DL (ref 11.5–15.4)
IMM GRANULOCYTES # BLD AUTO: 0.03 THOUSAND/UL (ref 0–0.2)
IMM GRANULOCYTES NFR BLD AUTO: 0 % (ref 0–2)
LDH SERPL-CCNC: 145 U/L (ref 140–271)
LYMPHOCYTES # BLD AUTO: 4.93 THOUSANDS/ÂΜL (ref 0.6–4.47)
LYMPHOCYTES NFR BLD AUTO: 45 % (ref 14–44)
MCH RBC QN AUTO: 26.4 PG (ref 26.8–34.3)
MCHC RBC AUTO-ENTMCNC: 31.4 G/DL (ref 31.4–37.4)
MCV RBC AUTO: 84 FL (ref 82–98)
MONOCYTES # BLD AUTO: 0.63 THOUSAND/ÂΜL (ref 0.17–1.22)
MONOCYTES NFR BLD AUTO: 6 % (ref 4–12)
NEUTROPHILS # BLD AUTO: 5.19 THOUSANDS/ÂΜL (ref 1.85–7.62)
NEUTS SEG NFR BLD AUTO: 47 % (ref 43–75)
NRBC BLD AUTO-RTO: 0 /100 WBCS
PLATELET # BLD AUTO: 208 THOUSANDS/UL (ref 149–390)
PMV BLD AUTO: 14.1 FL (ref 8.9–12.7)
POTASSIUM SERPL-SCNC: 3.9 MMOL/L (ref 3.5–5.3)
PROT SERPL-MCNC: 6.9 G/DL (ref 6.4–8.4)
RBC # BLD AUTO: 4.77 MILLION/UL (ref 3.81–5.12)
RETICS # AUTO: NORMAL 10*3/UL (ref 14097–95744)
RETICS # CALC: 1.62 % (ref 0.37–1.87)
SODIUM SERPL-SCNC: 142 MMOL/L (ref 135–147)
WBC # BLD AUTO: 11.02 THOUSAND/UL (ref 4.31–10.16)

## 2023-12-06 PROCEDURE — 83615 LACTATE (LD) (LDH) ENZYME: CPT | Performed by: INTERNAL MEDICINE

## 2023-12-06 PROCEDURE — 85025 COMPLETE CBC W/AUTO DIFF WBC: CPT | Performed by: INTERNAL MEDICINE

## 2023-12-06 PROCEDURE — 85045 AUTOMATED RETICULOCYTE COUNT: CPT | Performed by: INTERNAL MEDICINE

## 2023-12-06 PROCEDURE — 80053 COMPREHEN METABOLIC PANEL: CPT | Performed by: INTERNAL MEDICINE

## 2023-12-06 PROCEDURE — 36415 COLL VENOUS BLD VENIPUNCTURE: CPT | Performed by: INTERNAL MEDICINE

## 2024-01-26 ENCOUNTER — APPOINTMENT (OUTPATIENT)
Dept: LAB | Facility: CLINIC | Age: 64
End: 2024-01-26
Payer: COMMERCIAL

## 2024-01-26 DIAGNOSIS — C91.10 CLL (CHRONIC LYMPHOCYTIC LEUKEMIA) (HCC): ICD-10-CM

## 2024-01-26 LAB
ALBUMIN SERPL BCP-MCNC: 4.4 G/DL (ref 3.5–5)
ALP SERPL-CCNC: 101 U/L (ref 34–104)
ALT SERPL W P-5'-P-CCNC: 16 U/L (ref 7–52)
ANION GAP SERPL CALCULATED.3IONS-SCNC: 6 MMOL/L
AST SERPL W P-5'-P-CCNC: 21 U/L (ref 13–39)
BASOPHILS # BLD MANUAL: 0 THOUSAND/UL (ref 0–0.1)
BASOPHILS NFR MAR MANUAL: 0 % (ref 0–1)
BILIRUB SERPL-MCNC: 0.95 MG/DL (ref 0.2–1)
BUN SERPL-MCNC: 22 MG/DL (ref 5–25)
CALCIUM SERPL-MCNC: 9.9 MG/DL (ref 8.4–10.2)
CHLORIDE SERPL-SCNC: 106 MMOL/L (ref 96–108)
CO2 SERPL-SCNC: 29 MMOL/L (ref 21–32)
CREAT SERPL-MCNC: 0.79 MG/DL (ref 0.6–1.3)
EOSINOPHIL # BLD MANUAL: 0.23 THOUSAND/UL (ref 0–0.4)
EOSINOPHIL NFR BLD MANUAL: 2 % (ref 0–6)
ERYTHROCYTE [DISTWIDTH] IN BLOOD BY AUTOMATED COUNT: 15.1 % (ref 11.6–15.1)
GFR SERPL CREATININE-BSD FRML MDRD: 81 ML/MIN/1.73SQ M
GLUCOSE P FAST SERPL-MCNC: 92 MG/DL (ref 65–99)
HCT VFR BLD AUTO: 42.5 % (ref 34.8–46.1)
HGB BLD-MCNC: 12.6 G/DL (ref 11.5–15.4)
LDH SERPL-CCNC: 200 U/L (ref 140–271)
LYMPHOCYTES # BLD AUTO: 60 % (ref 14–44)
LYMPHOCYTES # BLD AUTO: 7.04 THOUSAND/UL (ref 0.6–4.47)
MCH RBC QN AUTO: 25.3 PG (ref 26.8–34.3)
MCHC RBC AUTO-ENTMCNC: 29.6 G/DL (ref 31.4–37.4)
MCV RBC AUTO: 85 FL (ref 82–98)
MONOCYTES # BLD AUTO: 0.23 THOUSAND/UL (ref 0–1.22)
MONOCYTES NFR BLD: 2 % (ref 4–12)
NEUTROPHILS # BLD MANUAL: 4.22 THOUSAND/UL (ref 1.85–7.62)
NEUTS SEG NFR BLD AUTO: 36 % (ref 43–75)
PLATELET # BLD AUTO: 208 THOUSANDS/UL (ref 149–390)
PLATELET BLD QL SMEAR: ADEQUATE
PMV BLD AUTO: 14.2 FL (ref 8.9–12.7)
POTASSIUM SERPL-SCNC: 3.8 MMOL/L (ref 3.5–5.3)
PROT SERPL-MCNC: 7.2 G/DL (ref 6.4–8.4)
RBC # BLD AUTO: 4.99 MILLION/UL (ref 3.81–5.12)
RBC MORPH BLD: NORMAL
RETICS # AUTO: NORMAL 10*3/UL (ref 14097–95744)
RETICS # CALC: 1.22 % (ref 0.37–1.87)
SODIUM SERPL-SCNC: 141 MMOL/L (ref 135–147)
WBC # BLD AUTO: 11.73 THOUSAND/UL (ref 4.31–10.16)

## 2024-01-26 PROCEDURE — 85027 COMPLETE CBC AUTOMATED: CPT

## 2024-01-26 PROCEDURE — 85045 AUTOMATED RETICULOCYTE COUNT: CPT

## 2024-01-26 PROCEDURE — 36415 COLL VENOUS BLD VENIPUNCTURE: CPT

## 2024-01-26 PROCEDURE — 80053 COMPREHEN METABOLIC PANEL: CPT

## 2024-01-26 PROCEDURE — 85007 BL SMEAR W/DIFF WBC COUNT: CPT

## 2024-01-26 PROCEDURE — 83615 LACTATE (LD) (LDH) ENZYME: CPT

## 2024-02-05 DIAGNOSIS — K21.00 GASTROESOPHAGEAL REFLUX DISEASE WITH ESOPHAGITIS WITHOUT HEMORRHAGE: ICD-10-CM

## 2024-02-06 RX ORDER — OMEPRAZOLE 40 MG/1
40 CAPSULE, DELAYED RELEASE ORAL DAILY
Qty: 30 CAPSULE | Refills: 2 | Status: SHIPPED | OUTPATIENT
Start: 2024-02-06

## 2024-02-29 DIAGNOSIS — R23.2 HOT FLASHES: ICD-10-CM

## 2024-02-29 RX ORDER — PAROXETINE 10 MG/1
10 TABLET, FILM COATED ORAL DAILY
Qty: 30 TABLET | Refills: 2 | Status: SHIPPED | OUTPATIENT
Start: 2024-02-29

## 2024-03-13 ENCOUNTER — APPOINTMENT (OUTPATIENT)
Dept: RADIOLOGY | Facility: CLINIC | Age: 64
End: 2024-03-13
Payer: MEDICARE

## 2024-03-13 ENCOUNTER — OFFICE VISIT (OUTPATIENT)
Dept: FAMILY MEDICINE CLINIC | Facility: CLINIC | Age: 64
End: 2024-03-13
Payer: MEDICARE

## 2024-03-13 ENCOUNTER — APPOINTMENT (OUTPATIENT)
Dept: LAB | Facility: CLINIC | Age: 64
End: 2024-03-13
Payer: MEDICARE

## 2024-03-13 VITALS
WEIGHT: 202 LBS | TEMPERATURE: 96.6 F | BODY MASS INDEX: 35.79 KG/M2 | HEIGHT: 63 IN | OXYGEN SATURATION: 98 % | SYSTOLIC BLOOD PRESSURE: 132 MMHG | DIASTOLIC BLOOD PRESSURE: 90 MMHG | HEART RATE: 73 BPM

## 2024-03-13 DIAGNOSIS — E28.39 MENOPAUSE OVARIAN FAILURE: ICD-10-CM

## 2024-03-13 DIAGNOSIS — Z00.00 MEDICARE ANNUAL WELLNESS VISIT, INITIAL: ICD-10-CM

## 2024-03-13 DIAGNOSIS — D72.829 LEUKOCYTOSIS, UNSPECIFIED TYPE: ICD-10-CM

## 2024-03-13 DIAGNOSIS — R63.5 ABNORMAL WEIGHT GAIN: ICD-10-CM

## 2024-03-13 DIAGNOSIS — R74.8 ELEVATED LIVER ENZYMES: ICD-10-CM

## 2024-03-13 DIAGNOSIS — E78.1 HYPERTRIGLYCERIDEMIA: ICD-10-CM

## 2024-03-13 DIAGNOSIS — E66.01 OBESITY, MORBID (HCC): Primary | ICD-10-CM

## 2024-03-13 DIAGNOSIS — C91.10 CLL (CHRONIC LYMPHOCYTIC LEUKEMIA) (HCC): ICD-10-CM

## 2024-03-13 DIAGNOSIS — C85.90 STAGE 4 LOW GRADE LYMPHOMA (HCC): ICD-10-CM

## 2024-03-13 DIAGNOSIS — R53.83 OTHER FATIGUE: ICD-10-CM

## 2024-03-13 DIAGNOSIS — M25.561 ACUTE PAIN OF RIGHT KNEE: ICD-10-CM

## 2024-03-13 DIAGNOSIS — R23.2 HOT FLASHES: ICD-10-CM

## 2024-03-13 DIAGNOSIS — R22.0 MASS OF RIGHT SUBMANDIBULAR REGION: ICD-10-CM

## 2024-03-13 PROBLEM — H93.12 TINNITUS, LEFT EAR: Status: RESOLVED | Noted: 2023-06-06 | Resolved: 2024-03-13

## 2024-03-13 LAB
ALBUMIN SERPL BCP-MCNC: 4.4 G/DL (ref 3.5–5)
ALP SERPL-CCNC: 100 U/L (ref 34–104)
ALT SERPL W P-5'-P-CCNC: 11 U/L (ref 7–52)
ANION GAP SERPL CALCULATED.3IONS-SCNC: 9 MMOL/L (ref 4–13)
ANISOCYTOSIS BLD QL SMEAR: PRESENT
AST SERPL W P-5'-P-CCNC: 17 U/L (ref 13–39)
BASOPHILS # BLD MANUAL: 0.54 THOUSAND/UL (ref 0–0.1)
BASOPHILS NFR MAR MANUAL: 4 % (ref 0–1)
BILIRUB SERPL-MCNC: 1.04 MG/DL (ref 0.2–1)
BUN SERPL-MCNC: 17 MG/DL (ref 5–25)
CALCIUM SERPL-MCNC: 10 MG/DL (ref 8.4–10.2)
CHLORIDE SERPL-SCNC: 104 MMOL/L (ref 96–108)
CHOLEST SERPL-MCNC: 188 MG/DL
CO2 SERPL-SCNC: 27 MMOL/L (ref 21–32)
CREAT SERPL-MCNC: 0.85 MG/DL (ref 0.6–1.3)
EOSINOPHIL # BLD MANUAL: 0 THOUSAND/UL (ref 0–0.4)
EOSINOPHIL NFR BLD MANUAL: 0 % (ref 0–6)
ERYTHROCYTE [DISTWIDTH] IN BLOOD BY AUTOMATED COUNT: 15.3 % (ref 11.6–15.1)
FERRITIN SERPL-MCNC: 19 NG/ML (ref 11–307)
GFR SERPL CREATININE-BSD FRML MDRD: 74 ML/MIN/1.73SQ M
GLUCOSE P FAST SERPL-MCNC: 96 MG/DL (ref 65–99)
HCT VFR BLD AUTO: 40.8 % (ref 34.8–46.1)
HDLC SERPL-MCNC: 42 MG/DL
HGB BLD-MCNC: 12.9 G/DL (ref 11.5–15.4)
IRON SATN MFR SERPL: 9 % (ref 15–50)
IRON SERPL-MCNC: 36 UG/DL (ref 50–212)
LDLC SERPL CALC-MCNC: 105 MG/DL (ref 0–100)
LYMPHOCYTES # BLD AUTO: 41 % (ref 14–44)
LYMPHOCYTES # BLD AUTO: 5.99 THOUSAND/UL (ref 0.6–4.47)
MCH RBC QN AUTO: 25.8 PG (ref 26.8–34.3)
MCHC RBC AUTO-ENTMCNC: 31.6 G/DL (ref 31.4–37.4)
MCV RBC AUTO: 82 FL (ref 82–98)
MONOCYTES # BLD AUTO: 0.95 THOUSAND/UL (ref 0–1.22)
MONOCYTES NFR BLD: 7 % (ref 4–12)
NEUTROPHILS # BLD MANUAL: 6.13 THOUSAND/UL (ref 1.85–7.62)
NEUTS BAND NFR BLD MANUAL: 1 % (ref 0–8)
NEUTS SEG NFR BLD AUTO: 44 % (ref 43–75)
NONHDLC SERPL-MCNC: 146 MG/DL
PLATELET # BLD AUTO: 222 THOUSANDS/UL (ref 149–390)
PLATELET BLD QL SMEAR: ADEQUATE
PMV BLD AUTO: 14 FL (ref 8.9–12.7)
POTASSIUM SERPL-SCNC: 4.3 MMOL/L (ref 3.5–5.3)
PROT SERPL-MCNC: 7.1 G/DL (ref 6.4–8.4)
RBC # BLD AUTO: 5 MILLION/UL (ref 3.81–5.12)
RBC MORPH BLD: PRESENT
RETICS # AUTO: NORMAL 10*3/UL (ref 14097–95744)
RETICS # CALC: 1.65 % (ref 0.37–1.87)
SODIUM SERPL-SCNC: 140 MMOL/L (ref 135–147)
TIBC SERPL-MCNC: 421 UG/DL (ref 250–450)
TRIGL SERPL-MCNC: 203 MG/DL
TSH SERPL DL<=0.05 MIU/L-ACNC: 3.76 UIU/ML (ref 0.45–4.5)
UIBC SERPL-MCNC: 385 UG/DL (ref 155–355)
VARIANT LYMPHS # BLD AUTO: 3 %
WBC # BLD AUTO: 13.62 THOUSAND/UL (ref 4.31–10.16)

## 2024-03-13 PROCEDURE — 85027 COMPLETE CBC AUTOMATED: CPT

## 2024-03-13 PROCEDURE — 85007 BL SMEAR W/DIFF WBC COUNT: CPT

## 2024-03-13 PROCEDURE — 99214 OFFICE O/P EST MOD 30 MIN: CPT | Performed by: FAMILY MEDICINE

## 2024-03-13 PROCEDURE — 73562 X-RAY EXAM OF KNEE 3: CPT

## 2024-03-13 PROCEDURE — 80061 LIPID PANEL: CPT

## 2024-03-13 PROCEDURE — 84443 ASSAY THYROID STIM HORMONE: CPT

## 2024-03-13 PROCEDURE — 83550 IRON BINDING TEST: CPT

## 2024-03-13 PROCEDURE — G0402 INITIAL PREVENTIVE EXAM: HCPCS | Performed by: FAMILY MEDICINE

## 2024-03-13 PROCEDURE — 36415 COLL VENOUS BLD VENIPUNCTURE: CPT

## 2024-03-13 PROCEDURE — 83540 ASSAY OF IRON: CPT

## 2024-03-13 PROCEDURE — 82728 ASSAY OF FERRITIN: CPT

## 2024-03-13 PROCEDURE — 85045 AUTOMATED RETICULOCYTE COUNT: CPT

## 2024-03-13 PROCEDURE — 80053 COMPREHEN METABOLIC PANEL: CPT

## 2024-03-13 RX ORDER — PHENTERMINE HYDROCHLORIDE 15 MG/1
15 CAPSULE ORAL EVERY MORNING
Qty: 30 CAPSULE | Refills: 0 | Status: SHIPPED | OUTPATIENT
Start: 2024-03-13

## 2024-03-13 NOTE — PROGRESS NOTES
Assessment and Plan:     Problem List Items Addressed This Visit    None       Preventive health issues were discussed with patient, and age appropriate screening tests were ordered as noted in patient's After Visit Summary.  Personalized health advice and appropriate referrals for health education or preventive services given if needed, as noted in patient's After Visit Summary.     History of Present Illness:     Patient presents for a Medicare Wellness Visit    HPI   Patient Care Team:  Delio Salvador MD as PCP - General (Family Medicine)  Delio Salvador MD as PCP - [facility]  MD Francoise Huddleston PA-C (Gastroenterology)     Review of Systems:     Review of Systems     Problem List:     Patient Active Problem List   Diagnosis    Gastroesophageal reflux disease    Stage 4 low grade lymphoma (HCC)    History of iron deficiency anemia    Anxiety and depression    Acquired pes planus    Cervical transverse process fracture, subsequent encounter    Closed displaced fracture of lateral malleolus of left fibula    Closed fracture of medial malleolus of left tibia    Detached retina, left    Heartburn    Left renal mass    Optic neuropathy, left    Kidney cysts    Hot flashes    Tinnitus, left ear      Past Medical and Surgical History:     Past Medical History:   Diagnosis Date    Anxiety     Depression     Lymphoma (HCC) 02/2022    Right trigger finger      Past Surgical History:   Procedure Laterality Date    ANKLE HARDWARE REMOVAL      CHOLECYSTECTOMY      HYSTERECTOMY      IR BIOPSY BONE MARROW  1/12/2022    US GUIDED BREAST BIOPSY RIGHT COMPLETE Right 4/5/2022    US GUIDED BREAST LYMPH NODE BIOPSY LEFT Left 9/8/2023      Family History:     Family History   Problem Relation Age of Onset    Hypertension Mother     Breast cancer Maternal Grandmother 60        unknown age    Diabetes Brother     Colon cancer Neg Hx     Ovarian cancer Neg Hx     Uterine cancer Neg Hx     Cervical cancer Neg Hx        Social History:     Social History     Socioeconomic History    Marital status: Legally      Spouse name: Not on file    Number of children: Not on file    Years of education: Not on file    Highest education level: Not on file   Occupational History    Not on file   Tobacco Use    Smoking status: Never    Smokeless tobacco: Never   Vaping Use    Vaping status: Never Used   Substance and Sexual Activity    Alcohol use: Not Currently    Drug use: Never    Sexual activity: Not Currently     Partners: Male   Other Topics Concern    Not on file   Social History Narrative    Not on file     Social Determinants of Health     Financial Resource Strain: Not on file   Food Insecurity: Not on file   Transportation Needs: Not on file   Physical Activity: Not on file   Stress: Not on file   Social Connections: Not on file   Intimate Partner Violence: Not on file   Housing Stability: Not on file      Medications and Allergies:     Current Outpatient Medications   Medication Sig Dispense Refill    Ascorbic Acid (vitamin C) 1000 MG tablet Take 1 tablet by mouth daily      Multiple Vitamin (multivitamin stress formula) Take 1 tablet by mouth daily (Patient not taking: Reported on 12/5/2023)      Omega-3 Fatty Acids (fish oil) 1,000 mg Take 1,000 mg by mouth daily      omeprazole (PriLOSEC) 40 MG capsule TAKE 1 CAPSULE (40 MG TOTAL) BY MOUTH DAILY. 30 capsule 2    PARoxetine (PAXIL) 10 mg tablet TAKE 1 TABLET BY MOUTH EVERY DAY 30 tablet 2    VITAMIN D PO Take by mouth       No current facility-administered medications for this visit.     Allergies   Allergen Reactions    Benadryl [Diphenhydramine] Anaphylaxis    Erythromycin Hives      Immunizations:     Immunization History   Administered Date(s) Administered    COVID-19 Pfizer vac (Reymundo-sucrose, gray cap) 12 yr+ IM 02/18/2022, 03/11/2022    Influenza, recombinant, quadrivalent,injectable, preservative free 10/22/2021    Pneumococcal Polysaccharide PPV23 11/05/2021       Health Maintenance:         Topic Date Due    HIV Screening  Never done    Breast Cancer Screening: Mammogram  06/07/2024    Colorectal Cancer Screening  01/08/2032    Hepatitis C Screening  Completed         Topic Date Due    COVID-19 Vaccine (3 - Pfizer risk series) 04/08/2022    Pneumococcal Vaccine: Pediatrics (0 to 5 Years) and At-Risk Patients (6 to 64 Years) (2 of 2 - PCV) 11/05/2022    Influenza Vaccine (1) 09/01/2023      Medicare Screening Tests and Risk Assessments:         Health Risk Assessment:   Patient rates overall health as good. Patient feels that their physical health rating is slightly better. Patient is satisfied with their life. Eyesight was rated as same. Hearing was rated as same. Patient feels that their emotional and mental health rating is slightly better. Patients states they are never, rarely angry. Patient states they are never, rarely unusually tired/fatigued. Pain experienced in the last 7 days has been some. Patient's pain rating has been 8/10. Patient states that she has experienced no weight loss or gain in last 6 months.     Depression Screening:   PHQ-9 Score: 4      Fall Risk Screening:   In the past year, patient has experienced: no history of falling in past year      Urinary Incontinence Screening:   Patient has not leaked urine accidently in the last six months.     Home Safety:  Patient does not have trouble with stairs inside or outside of their home. Patient has working smoke alarms and has working carbon monoxide detector. Home safety hazards include: none.     Nutrition:   Current diet is Limited junk food and Regular.     Medications:   Patient is currently taking over-the-counter supplements. OTC medications include: see medication list. Patient is able to manage medications.     Activities of Daily Living (ADLs)/Instrumental Activities of Daily Living (IADLs):   Walk and transfer into and out of bed and chair?: Yes  Dress and groom yourself?: Yes    Bathe or  shower yourself?: Yes    Feed yourself? Yes  Do your laundry/housekeeping?: Yes  Manage your money, pay your bills and track your expenses?: Yes  Make your own meals?: Yes    Do your own shopping?: Yes    Previous Hospitalizations:   Any hospitalizations or ED visits within the last 12 months?: No      PREVENTIVE SCREENINGS      Cardiovascular Screening:    General: History Lipid Disorder and Screening Current      Diabetes Screening:     General: Screening Current      Colorectal Cancer Screening:     General: Screening Current      Breast Cancer Screening:     General: Screening Current      Osteoporosis Screening:      Due for: DXA Axial      Abdominal Aortic Aneurysm (AAA) Screening:        General: Screening Not Indicated      Lung Cancer Screening:     General: Screening Not Indicated      Hepatitis C Screening:    General: Screening Current    Screening, Brief Intervention, and Referral to Treatment (SBIRT)    Screening  Typical number of drinks in a day: 0  Typical number of drinks in a week: 0  Interpretation: Low risk drinking behavior.    Single Item Drug Screening:  How often have you used an illegal drug (including marijuana) or a prescription medication for non-medical reasons in the past year? never    Single Item Drug Screen Score: 0  Interpretation: Negative screen for possible drug use disorder    No results found.     Physical Exam:     There were no vitals taken for this visit.    Physical Exam     Delio Salvador MD

## 2024-03-13 NOTE — PROGRESS NOTES
Name: Beverley Paul      : 1960      MRN: [id number]  Encounter Provider: Delio Salvador MD  Encounter Date: 3/13/2024   Encounter department: [facility]    Assessment & Plan     1. Obesity, morbid (HCC)  After discussing risks and benefits of medication along with side effects will start the following:   -     phentermine 15 MG capsule; Take 1 capsule (15 mg total) by mouth every morning    2. Stage 4 low grade lymphoma (HCC)  -     Reticulocytes; Future    3. Menopause ovarian failure  -     DXA bone density spine hip and pelvis; Future; Expected date: 2024    4. Acute pain of right knee  Recommend to take tylenol  -     XR knee 3 vw right non injury; Future; Expected date: 2024  -     Diclofenac Sodium (VOLTAREN) 1 %; Apply 2 g topically 4 (four) times a day    5. Leukocytosis, unspecified type  -     Reticulocytes; Future    6. Hypertriglyceridemia  -     Lipid panel; Future    7. Elevated liver enzymes  -     Comprehensive metabolic panel; Future    8. Hot flashes  She was advised to contact Heme?onc team  Blood work ordered    9. Other fatigue  -     TSH, 3rd generation with Free T4 reflex; Future  -     CBC and differential; Future    10. Abnormal weight gain  Advised to stop Paxil  Will start low dose Phentermine she was explained side effects    11. Mass of right submandibular region  -     US head neck soft tissue; Future; Expected date: 2024    12. Medicare annual wellness visit, initial  See Medicare wellness note    Follow up in 3 months         Subjective     Patient is here to follow up.  She has been gaining weight. She has gained 10 lbs in the last 6 months.  She is also having hot flashes at night, denies any fevers or chills. Does have a Hx of does have a Hx of lymphoma. Saw Heme/Onc 3 months ago.  Has right knee pain as well denies any recent injuries however.      Review of Systems   Constitutional:  Positive for chills, fatigue, fever and  unexpected weight change. Negative for activity change and appetite change.   HENT:  Negative for congestion and ear discharge.    Respiratory:  Negative for cough and shortness of breath.    Cardiovascular:  Negative for chest pain and palpitations.   Gastrointestinal:  Negative for diarrhea and nausea.   Endocrine: Positive for heat intolerance.   Musculoskeletal:  Negative for arthralgias and back pain.   Skin:  Negative for color change and rash.   Neurological:  Negative for dizziness and headaches.   Psychiatric/Behavioral:  Negative for agitation and behavioral problems.        Past Medical History:   Diagnosis Date    Anxiety     Depression     Lymphoma (HCC) 02/2022    Right trigger finger      Past Surgical History:   Procedure Laterality Date    ANKLE HARDWARE REMOVAL      CHOLECYSTECTOMY      HYSTERECTOMY      IR BIOPSY BONE MARROW  1/12/2022    US GUIDED BREAST BIOPSY RIGHT COMPLETE Right 4/5/2022    US GUIDED BREAST LYMPH NODE BIOPSY LEFT Left 9/8/2023     Family History   Problem Relation Age of Onset    Hypertension Mother     Breast cancer Maternal Grandmother 60        unknown age    Diabetes Brother     Colon cancer Neg Hx     Ovarian cancer Neg Hx     Uterine cancer Neg Hx     Cervical cancer Neg Hx      Social History     Socioeconomic History    Marital status: Legally      Spouse name: None    Number of children: None    Years of education: None    Highest education level: None   Occupational History    None   Tobacco Use    Smoking status: Never    Smokeless tobacco: Never   Vaping Use    Vaping status: Never Used   Substance and Sexual Activity    Alcohol use: Not Currently    Drug use: Never    Sexual activity: Not Currently     Partners: Male   Other Topics Concern    None   Social History Narrative    None     Social Determinants of Health     Financial Resource Strain: Not on file   Food Insecurity: Patient Declined (3/13/2024)    Hunger Vital Sign     Worried About Running Out  "of Food in the Last Year: Patient declined     Ran Out of Food in the Last Year: Patient declined   Transportation Needs: Patient Declined (3/13/2024)    PRAPARE - Transportation     Lack of Transportation (Medical): Patient declined     Lack of Transportation (Non-Medical): Patient declined   Physical Activity: Not on file   Stress: Not on file   Social Connections: Not on file   Intimate Partner Violence: Not on file   Housing Stability: Patient Declined (3/13/2024)    Housing Stability Vital Sign     Unable to Pay for Housing in the Last Year: Patient declined     Number of Places Lived in the Last Year: Not on file     Unstable Housing in the Last Year: Patient declined     Current Outpatient Medications on File Prior to Visit   Medication Sig    Ascorbic Acid (vitamin C) 1000 MG tablet Take 1 tablet by mouth daily    Multiple Vitamin (multivitamin stress formula) Take 1 tablet by mouth daily (Patient not taking: Reported on 12/5/2023)    Omega-3 Fatty Acids (fish oil) 1,000 mg Take 1,000 mg by mouth daily    omeprazole (PriLOSEC) 40 MG capsule TAKE 1 CAPSULE (40 MG TOTAL) BY MOUTH DAILY.    VITAMIN D PO Take by mouth    [DISCONTINUED] PARoxetine (PAXIL) 10 mg tablet TAKE 1 TABLET BY MOUTH EVERY DAY     Allergies   Allergen Reactions    Benadryl [Diphenhydramine] Anaphylaxis    Erythromycin Hives     Immunization History   Administered Date(s) Administered    COVID-19 Pfizer vac (Reymundo-sucrose, gray cap) 12 yr+ IM 02/18/2022, 03/11/2022    Influenza, recombinant, quadrivalent,injectable, preservative free 10/22/2021    Pneumococcal Polysaccharide PPV23 11/05/2021       Objective     /90   Pulse 73   Temp (!) 96.6 °F (35.9 °C)   Ht 5' 3\" (1.6 m)   Wt 91.6 kg (202 lb)   SpO2 98%   BMI 35.78 kg/m²     Physical Exam  Constitutional:       General: She is not in acute distress.     Appearance: She is well-developed. She is not diaphoretic.   HENT:      Head: Normocephalic and atraumatic.      Nose: Nose " normal.   Eyes:      Conjunctiva/sclera: Conjunctivae normal.      Pupils: Pupils are equal, round, and reactive to light.   Cardiovascular:      Rate and Rhythm: Normal rate and regular rhythm.      Heart sounds: Normal heart sounds. No murmur heard.  Pulmonary:      Effort: Pulmonary effort is normal. No respiratory distress.      Breath sounds: Normal breath sounds. No wheezing.   Abdominal:      General: Bowel sounds are normal. There is no distension.      Palpations: Abdomen is soft.      Tenderness: There is no abdominal tenderness.   Skin:     General: Skin is warm and dry.      Findings: No erythema or rash.   Neurological:      Mental Status: She is alert and oriented to person, place, and time.      Coordination: Coordination normal.       Delio Salvador MD

## 2024-03-13 NOTE — PATIENT INSTRUCTIONS
Medicare Preventive Visit Patient Instructions  Thank you for completing your Welcome to Medicare Visit or Medicare Annual Wellness Visit today. Your next wellness visit will be due in one year (3/14/2025).  The screening/preventive services that you may require over the next 5-10 years are detailed below. Some tests may not apply to you based off risk factors and/or age. Screening tests ordered at today's visit but not completed yet may show as past due. Also, please note that scanned in results may not display below.  Preventive Screenings:  Service Recommendations Previous Testing/Comments   Colorectal Cancer Screening  * Colonoscopy    * Fecal Occult Blood Test (FOBT)/Fecal Immunochemical Test (FIT)  * Fecal DNA/Cologuard Test  * Flexible Sigmoidoscopy Age: 45-75 years old   Colonoscopy: every 10 years (may be performed more frequently if at higher risk)  OR  FOBT/FIT: every 1 year  OR  Cologuard: every 3 years  OR  Sigmoidoscopy: every 5 years  Screening may be recommended earlier than age 45 if at higher risk for colorectal cancer. Also, an individualized decision between you and your healthcare provider will decide whether screening between the ages of 76-85 would be appropriate. Colonoscopy: 01/10/2022  FOBT/FIT: Not on file  Cologuard: Not on file  Sigmoidoscopy: Not on file    Screening Current     Breast Cancer Screening Age: 40+ years old  Frequency: every 1-2 years  Not required if history of left and right mastectomy Mammogram: 06/07/2023    Screening Current   Cervical Cancer Screening Between the ages of 21-29, pap smear recommended once every 3 years.   Between the ages of 30-65, can perform pap smear with HPV co-testing every 5 years.   Recommendations may differ for women with a history of total hysterectomy, cervical cancer, or abnormal pap smears in past. Pap Smear: Not on file        Hepatitis C Screening Once for adults born between 1945 and 1965  More frequently in patients at high risk for  Hepatitis C Hep C Antibody: Not on file    Screening Current   Diabetes Screening 1-2 times per year if you're at risk for diabetes or have pre-diabetes Fasting glucose: 92 mg/dL (1/26/2024)  A1C: 5.3 % (2/7/2022)  Screening Current   Cholesterol Screening Once every 5 years if you don't have a lipid disorder. May order more often based on risk factors. Lipid panel: 06/12/2023    Screening Not Indicated  History Lipid Disorder     Other Preventive Screenings Covered by Medicare:  Abdominal Aortic Aneurysm (AAA) Screening: covered once if your at risk. You're considered to be at risk if you have a family history of AAA.  Lung Cancer Screening: covers low dose CT scan once per year if you meet all of the following conditions: (1) Age 55-77; (2) No signs or symptoms of lung cancer; (3) Current smoker or have quit smoking within the last 15 years; (4) You have a tobacco smoking history of at least 20 pack years (packs per day multiplied by number of years you smoked); (5) You get a written order from a healthcare provider.  Glaucoma Screening: covered annually if you're considered high risk: (1) You have diabetes OR (2) Family history of glaucoma OR (3)  aged 50 and older OR (4)  American aged 65 and older  Osteoporosis Screening: covered every 2 years if you meet one of the following conditions: (1) You're estrogen deficient and at risk for osteoporosis based off medical history and other findings; (2) Have a vertebral abnormality; (3) On glucocorticoid therapy for more than 3 months; (4) Have primary hyperparathyroidism; (5) On osteoporosis medications and need to assess response to drug therapy.   Last bone density test (DXA Scan): Not on file.  HIV Screening: covered annually if you're between the age of 15-65. Also covered annually if you are younger than 15 and older than 65 with risk factors for HIV infection. For pregnant patients, it is covered up to 3 times per  pregnancy.    Immunizations:  Immunization Recommendations   Influenza Vaccine Annual influenza vaccination during flu season is recommended for all persons aged >= 6 months who do not have contraindications   Pneumococcal Vaccine   * Pneumococcal conjugate vaccine = PCV13 (Prevnar 13), PCV15 (Vaxneuvance), PCV20 (Prevnar 20)  * Pneumococcal polysaccharide vaccine = PPSV23 (Pneumovax) Adults 19-65 yo with certain risk factors or if 65+ yo  If never received any pneumonia vaccine: recommend Prevnar 20 (PCV20)  Give PCV20 if previously received 1 dose of PCV13 or PPSV23   Hepatitis B Vaccine 3 dose series if at intermediate or high risk (ex: diabetes, end stage renal disease, liver disease)   Respiratory syncytial virus (RSV) Vaccine - COVERED BY MEDICARE PART D  * RSVPreF3 (Arexvy) CDC recommends that adults 60 years of age and older may receive a single dose of RSV vaccine using shared clinical decision-making (SCDM)   Tetanus (Td) Vaccine - COST NOT COVERED BY MEDICARE PART B Following completion of primary series, a booster dose should be given every 10 years to maintain immunity against tetanus. Td may also be given as tetanus wound prophylaxis.   Tdap Vaccine - COST NOT COVERED BY MEDICARE PART B Recommended at least once for all adults. For pregnant patients, recommended with each pregnancy.   Shingles Vaccine (Shingrix) - COST NOT COVERED BY MEDICARE PART B  2 shot series recommended in those 19 years and older who have or will have weakened immune systems or those 50 years and older     Health Maintenance Due:      Topic Date Due   • HIV Screening  Never done   • Breast Cancer Screening: Mammogram  06/07/2024   • Colorectal Cancer Screening  01/08/2032   • Hepatitis C Screening  Completed     Immunizations Due:      Topic Date Due   • COVID-19 Vaccine (3 - Pfizer risk series) 04/08/2022   • Pneumococcal Vaccine: Pediatrics (0 to 5 Years) and At-Risk Patients (6 to 64 Years) (2 of 2 - PCV) 11/05/2022   •  Influenza Vaccine (1) 09/01/2023     Advance Directives   What are advance directives?  Advance directives are legal documents that state your wishes and plans for medical care. These plans are made ahead of time in case you lose your ability to make decisions for yourself. Advance directives can apply to any medical decision, such as the treatments you want, and if you want to donate organs.   What are the types of advance directives?  There are many types of advance directives, and each state has rules about how to use them. You may choose a combination of any of the following:  Living will:  This is a written record of the treatment you want. You can also choose which treatments you do not want, which to limit, and which to stop at a certain time. This includes surgery, medicine, IV fluid, and tube feedings.   Durable power of  for healthcare (DPAHC):  This is a written record that states who you want to make healthcare choices for you when you are unable to make them for yourself. This person, called a proxy, is usually a family member or a friend. You may choose more than 1 proxy.  Do not resuscitate (DNR) order:  A DNR order is used in case your heart stops beating or you stop breathing. It is a request not to have certain forms of treatment, such as CPR. A DNR order may be included in other types of advance directives.  Medical directive:  This covers the care that you want if you are in a coma, near death, or unable to make decisions for yourself. You can list the treatments you want for each condition. Treatment may include pain medicine, surgery, blood transfusions, dialysis, IV or tube feedings, and a ventilator (breathing machine).  Values history:  This document has questions about your views, beliefs, and how you feel and think about life. This information can help others choose the care that you would choose.  Why are advance directives important?  An advance directive helps you control your  care. Although spoken wishes may be used, it is better to have your wishes written down. Spoken wishes can be misunderstood, or not followed. Treatments may be given even if you do not want them. An advance directive may make it easier for your family to make difficult choices about your care.   Weight Management   Why it is important to manage your weight:  Being overweight increases your risk of health conditions such as heart disease, high blood pressure, type 2 diabetes, and certain types of cancer. It can also increase your risk for osteoarthritis, sleep apnea, and other respiratory problems. Aim for a slow, steady weight loss. Even a small amount of weight loss can lower your risk of health problems.  How to lose weight safely:  A safe and healthy way to lose weight is to eat fewer calories and get regular exercise. You can lose up about 1 pound a week by decreasing the number of calories you eat by 500 calories each day.   Healthy meal plan for weight management:  A healthy meal plan includes a variety of foods, contains fewer calories, and helps you stay healthy. A healthy meal plan includes the following:  Eat whole-grain foods more often.  A healthy meal plan should contain fiber. Fiber is the part of grains, fruits, and vegetables that is not broken down by your body. Whole-grain foods are healthy and provide extra fiber in your diet. Some examples of whole-grain foods are whole-wheat breads and pastas, oatmeal, brown rice, and bulgur.  Eat a variety of vegetables every day.  Include dark, leafy greens such as spinach, kale, radha greens, and mustard greens. Eat yellow and orange vegetables such as carrots, sweet potatoes, and winter squash.   Eat a variety of fruits every day.  Choose fresh or canned fruit (canned in its own juice or light syrup) instead of juice. Fruit juice has very little or no fiber.  Eat low-fat dairy foods.  Drink fat-free (skim) milk or 1% milk. Eat fat-free yogurt and low-fat  cottage cheese. Try low-fat cheeses such as mozzarella and other reduced-fat cheeses.  Choose meat and other protein foods that are low in fat.  Choose beans or other legumes such as split peas or lentils. Choose fish, skinless poultry (chicken or turkey), or lean cuts of red meat (beef or pork). Before you cook meat or poultry, cut off any visible fat.   Use less fat and oil.  Try baking foods instead of frying them. Add less fat, such as margarine, sour cream, regular salad dressing and mayonnaise to foods. Eat fewer high-fat foods. Some examples of high-fat foods include french fries, doughnuts, ice cream, and cakes.  Eat fewer sweets.  Limit foods and drinks that are high in sugar. This includes candy, cookies, regular soda, and sweetened drinks.  Exercise:  Exercise at least 30 minutes per day on most days of the week. Some examples of exercise include walking, biking, dancing, and swimming. You can also fit in more physical activity by taking the stairs instead of the elevator or parking farther away from stores. Ask your healthcare provider about the best exercise plan for you.      © Copyright [employer] 2018 Information is for End User's use only and may not be sold, redistributed or otherwise used for commercial purposes. All illustrations and images included in CareNotes® are the copyrighted property of A.D.A.M., Inc. or "[employer]

## 2024-04-26 DIAGNOSIS — K21.00 GASTROESOPHAGEAL REFLUX DISEASE WITH ESOPHAGITIS WITHOUT HEMORRHAGE: ICD-10-CM

## 2024-04-26 RX ORDER — OMEPRAZOLE 40 MG/1
40 CAPSULE, DELAYED RELEASE ORAL DAILY
Qty: 90 CAPSULE | Refills: 1 | Status: SHIPPED | OUTPATIENT
Start: 2024-04-26

## 2024-05-01 PROBLEM — Z00.00 MEDICARE ANNUAL WELLNESS VISIT, INITIAL: Status: RESOLVED | Noted: 2024-03-13 | Resolved: 2024-05-01

## 2024-05-22 ENCOUNTER — OFFICE VISIT (OUTPATIENT)
Dept: GASTROENTEROLOGY | Facility: CLINIC | Age: 64
End: 2024-05-22
Payer: COMMERCIAL

## 2024-05-22 VITALS
OXYGEN SATURATION: 98 % | TEMPERATURE: 98 F | HEIGHT: 63 IN | DIASTOLIC BLOOD PRESSURE: 84 MMHG | WEIGHT: 199 LBS | HEART RATE: 74 BPM | RESPIRATION RATE: 18 BRPM | SYSTOLIC BLOOD PRESSURE: 124 MMHG | BODY MASS INDEX: 35.26 KG/M2

## 2024-05-22 DIAGNOSIS — K31.84 GASTROPARESIS: ICD-10-CM

## 2024-05-22 DIAGNOSIS — K21.9 GASTROESOPHAGEAL REFLUX DISEASE WITHOUT ESOPHAGITIS: Primary | ICD-10-CM

## 2024-05-22 DIAGNOSIS — K44.9 HIATAL HERNIA: ICD-10-CM

## 2024-05-22 PROCEDURE — 99213 OFFICE O/P EST LOW 20 MIN: CPT | Performed by: PHYSICIAN ASSISTANT

## 2024-05-22 NOTE — PROGRESS NOTES
[facility] - Outpatient Follow-up Note  Beverley Paul 63 y.o. female MRN: [id number]  Encounter: [id number]          ASSESSMENT AND PLAN:      GERD  Hiatal Hernia  Gastroparesis  - Overall very well controlled with omeprazole 40 mg daily and following a gastroparesis diet with small portions low in fat  - EGD in 2022 showed a 5 cm hiatal hernia  - GES positive in 2023  - We will continue omeprazole 40 mg daily, given her 5 cm hiatal hernia and gastroparesis she will likely need some kind of antacid regimen indefinitely  ______________________________________________________________________    SUBJECTIVE: Beverley is a 63-year-old female presenting for routine follow-up of GERD and gastroparesis.  She was last seen in 2023 and at that time she was doing much better on omeprazole 40 mg daily.  This is controlling her reflux symptoms and she was no longer having nausea or episodes of vomiting.  She reports that she remains to be doing well without any symptoms as long as she is on the 40 mg of omeprazole every day.  She previously was struggling with some constipation at her last office visit but reports that she is having a bowel movement every day routinely without any problems.  She really has no complaints and is happy with her progress so far.  She is more so stressed by her hematologic diagnosis of low-grade lymphoma in the bone marrow and she will be seeing hematology in a few weeks for routine follow-up.      REVIEW OF SYSTEMS IS OTHERWISE NEGATIVE.      Historical Information   Past Medical History:   Diagnosis Date    Anxiety     Depression     Lymphoma (HCC) 02/2022    Right trigger finger      Past Surgical History:   Procedure Laterality Date    ANKLE HARDWARE REMOVAL      CHOLECYSTECTOMY      HYSTERECTOMY      IR BIOPSY BONE MARROW  1/12/2022    US GUIDED BREAST BIOPSY RIGHT COMPLETE Right 4/5/2022    US GUIDED BREAST LYMPH NODE BIOPSY LEFT Left 9/8/2023     Social History  "  Social History     Substance and Sexual Activity   Alcohol Use Not Currently     Social History     Substance and Sexual Activity   Drug Use Never     Social History     Tobacco Use   Smoking Status Never   Smokeless Tobacco Never     Family History   Problem Relation Age of Onset    Hypertension Mother     Breast cancer Maternal Grandmother 60        unknown age    Diabetes Brother     Colon cancer Neg Hx     Ovarian cancer Neg Hx     Uterine cancer Neg Hx     Cervical cancer Neg Hx        Meds/Allergies       Current Outpatient Medications:     Ascorbic Acid (vitamin C) 1000 MG tablet    Diclofenac Sodium (VOLTAREN) 1 %    Omega-3 Fatty Acids (fish oil) 1,000 mg    omeprazole (PriLOSEC) 40 MG capsule    phentermine 15 MG capsule    VITAMIN D PO    Multiple Vitamin (multivitamin stress formula)    Allergies   Allergen Reactions    Benadryl [Diphenhydramine] Anaphylaxis    Erythromycin Hives           Objective     Blood pressure 124/84, pulse 74, temperature 98 °F (36.7 °C), temperature source Tympanic, resp. rate 18, height 5' 3\" (1.6 m), weight 90.3 kg (199 lb), SpO2 98%. Body mass index is 35.25 kg/m².      PHYSICAL EXAM:      General Appearance:   Alert, cooperative, no distress   HEENT:   Normocephalic, atraumatic, anicteric.     Neck:  Supple, symmetrical, trachea midline   Lungs:   Clear to auscultation bilaterally; no rales, rhonchi or wheezing; respirations unlabored    Heart::   Regular rate and rhythm; no murmur, rub, or gallop.   Abdomen:   Soft, non-tender, non-distended; normal bowel sounds; no masses, no organomegaly    Genitalia:   Deferred    Rectal:   Deferred    Extremities:  No cyanosis, clubbing or edema    Pulses:  2+ and symmetric    Skin:  No jaundice, rashes, or lesions    Lymph nodes:  No palpable cervical lymphadenopathy        Lab Results:   No visits with results within 1 Day(s) from this visit.   Latest known visit with results is:   Appointment on 03/13/2024   Component Date Value "    Sodium 03/13/2024 140     Potassium 03/13/2024 4.3     Chloride 03/13/2024 104     CO2 03/13/2024 27     ANION GAP 03/13/2024 9     BUN 03/13/2024 17     Creatinine 03/13/2024 0.85     Glucose, Fasting 03/13/2024 96     Calcium 03/13/2024 10.0     AST 03/13/2024 17     ALT 03/13/2024 11     Alkaline Phosphatase 03/13/2024 100     Total Protein 03/13/2024 7.1     Albumin 03/13/2024 4.4     Total Bilirubin 03/13/2024 1.04 (H)     eGFR 03/13/2024 74     Cholesterol 03/13/2024 188     Triglycerides 03/13/2024 203 (H)     HDL, Direct 03/13/2024 42 (L)     LDL Calculated 03/13/2024 105 (H)     Non-HDL-Chol (CHOL-HDL) 03/13/2024 146     Retic Ct Abs 03/13/2024 77,500     Retic Ct Pct 03/13/2024 1.65     TSH 3RD GENERATON 03/13/2024 3.761     WBC 03/13/2024 13.62 (H)     RBC 03/13/2024 5.00     Hemoglobin 03/13/2024 12.9     Hematocrit 03/13/2024 40.8     MCV 03/13/2024 82     MCH 03/13/2024 25.8 (L)     MCHC 03/13/2024 31.6     RDW 03/13/2024 15.3 (H)     MPV 03/13/2024 14.0 (H)     Platelets 03/13/2024 222     Iron Saturation 03/13/2024 9 (L)     TIBC 03/13/2024 421     Iron 03/13/2024 36 (L)     UIBC 03/13/2024 385 (H)     Ferritin 03/13/2024 19     Segmented % 03/13/2024 44     Bands % 03/13/2024 1     Lymphocytes % 03/13/2024 41     Monocytes % 03/13/2024 7     Eosinophils % 03/13/2024 0     Basophils % 03/13/2024 4 (H)     Atypical Lymphocytes % 03/13/2024 3 (H)     Absolute Neutrophils 03/13/2024 6.13     Absolute Lymphocytes 03/13/2024 5.99 (H)     Absolute Monocytes 03/13/2024 0.95     Absolute Eosinophils 03/13/2024 0.00     Absolute Basophils 03/13/2024 0.54 (H)     RBC Morphology 03/13/2024 Present     Platelet Estimate 03/13/2024 Adequate     Anisocytosis 03/13/2024 Present          Radiology Results:   No results found.

## 2024-06-05 ENCOUNTER — OFFICE VISIT (OUTPATIENT)
Dept: HEMATOLOGY ONCOLOGY | Facility: CLINIC | Age: 64
End: 2024-06-05
Payer: COMMERCIAL

## 2024-06-05 VITALS
WEIGHT: 199 LBS | HEIGHT: 63 IN | BODY MASS INDEX: 35.26 KG/M2 | TEMPERATURE: 98.2 F | DIASTOLIC BLOOD PRESSURE: 70 MMHG | OXYGEN SATURATION: 97 % | SYSTOLIC BLOOD PRESSURE: 112 MMHG | HEART RATE: 59 BPM | RESPIRATION RATE: 16 BRPM

## 2024-06-05 DIAGNOSIS — C85.90 STAGE 4 LOW GRADE LYMPHOMA (HCC): Primary | ICD-10-CM

## 2024-06-05 PROCEDURE — 99214 OFFICE O/P EST MOD 30 MIN: CPT | Performed by: INTERNAL MEDICINE

## 2024-06-05 PROCEDURE — G2211 COMPLEX E/M VISIT ADD ON: HCPCS | Performed by: INTERNAL MEDICINE

## 2024-06-05 NOTE — PROGRESS NOTES
Hematology/Oncology Outpatient Follow- up Note  Beverley Paul 63 y.o. female MRN: @ Encounter: [id number]        Date:  6/5/2024        Assessment / Plan:    1 low-grade B-cell lymphoma/CLL/SLL.  Trisomy 12 positive I GVH mutated.  Plan: Patient will return in 6 months repeat her labs at that point.  Doing well.      HPI: 63-year-old female with a history of low-grade B-cell lymphoma CLL/SLL.  Was found in the left axillary node on biopsy.  She is doing well.  Her counts are stable.  She has no major swelling or edema.  She has no fever chills weight loss.  No bruising or bleeding.  We talked about treatment.  We would observe.  If she develops anemia thrombocytopenia bulky lymphadenopathy fever sweats we will then consider treatment.  She has done these and is doing well with stable counts at present.      Interval History: Note from 12/5/2023:  PRIMARY HEMATOLOGIC/ONCOLOGIC DIAGNOSIS:  Low grade B cell lymphoma--CLL/SLL. Trisomy 12 positive.  IgVH mutated  PATHOLOGY:  Final Diagnosis   A. Axilla, lymph node, left, biopsy:  -   Chronic lymphocytic leukemia/small lymphocytic lymphoma (CLL/SLL), see comment.   -   Negative for carcinoma.     Comment: The patient's history of axillary lymphadenopathy and low grade B-cell lymphoma identified in the bone marrow are noted. The current sample shows a CD5+ B-cell lymphoma with expression of CD23 and LEF1 with negative BCL-1 and SOX-11 staining supports a diagnosis of chronic lymphocytic leukemia/small lymphocytic lymphoma (CLL/SLL). Flow cytometry reveals a clonal B-cell population with dim to partial expression of CD5 and CD23.               Prognostic CLL FISH identifies persistence of the patient's trisomy 12 (intermediate risk), which was previously identified in the patient's bone marrow biopsy ([id number]). Cyclin D1 FISH is negative. There is no evidence of disease progression by FISH analysis. IgVH mutations analysis shows a [id number]_02 mutation with a mutation  "rate of 5.75%, confirming a favorable prognostic mutated CLL. Lymphoid Next generation sequencing is pending and will be reported as a separate addendum. Follow-up with patient's hematology/oncology provider is recommended.    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 Labs stable. Continue surveillance. Imaging from 2022 reviewed. F/u in 6m. Labs Q3m      Cancer Staging:  Cancer Staging   No matching staging information was found for the patient.      Molecular Testing:     Previous Hematologic/ Oncologic History:    Oncology History   Stage 4 low grade lymphoma (HCC)   1/12/2022 Initial Diagnosis    October 2021 patient had a white count showing 13.7, hemoglobin 8.5, MCV 70, platelet count 257, normal differential.  Absolute lymphocytes 5.7.  Iron saturation 5%.  November 2021 flow cytometry showed MBL, nonspecific phenotype, clonal B cells 2.1.  January 12, 2022 bone marrow biopsy showed low-grade B-cell lymphoma, nonspecific phenotype, 30-35% of 65-70% cellular marrow.  NexGen sequencing showed no mutations.  Karyotype 47 XX trisomy 12.  Otherwise normal maturation.         Current Hematologic/ Oncologic Treatment:       Cycle 1         Test Results:    Imaging: No results found.          Labs:   Lab Results   Component Value Date    WBC 13.62 (H) 03/13/2024    HGB 12.9 03/13/2024    HCT 40.8 03/13/2024    MCV 82 03/13/2024     03/13/2024     Lab Results   Component Value Date    K 4.3 03/13/2024     03/13/2024    CO2 27 03/13/2024    BUN 17 03/13/2024    CREATININE 0.85 03/13/2024    GLUF 96 03/13/2024    CALCIUM 10.0 03/13/2024    AST 17 03/13/2024    ALT 11 03/13/2024    ALKPHOS 100 03/13/2024    EGFR 74 03/13/2024         No results found for: \"SPEP\", \"UPEP\"    No results found for: \"PSA\"    No results found for: \"CEA\"    No results found for: \"\"    No results found for: \"AFP\"    Lab Results   Component Value Date    IRON 36 (L) 03/13/2024    " TIBC 421 03/13/2024    FERRITIN 19 03/13/2024       Lab Results   Component Value Date    KYOUMIGD20 1,139 (H) 11/30/2021         ROS: Review of Systems   Constitutional: Negative.    HENT: Negative.     Eyes: Negative.    Respiratory: Negative.     Cardiovascular: Negative.    Gastrointestinal: Negative.    Endocrine: Negative.    Genitourinary: Negative.    Musculoskeletal: Negative.    Skin: Negative.    Allergic/Immunologic: Negative.    Neurological: Negative.    Hematological: Negative.  Negative for adenopathy.         Current Medications: Reviewed  Allergies: Reviewed  PMH/FH/SH:  Reviewed      Physical Exam:    Body surface area is 1.93 meters squared.    Wt Readings from Last 3 Encounters:   06/05/24 90.3 kg (199 lb)   05/22/24 90.3 kg (199 lb)   03/13/24 91.6 kg (202 lb)        Temp Readings from Last 3 Encounters:   06/05/24 98.2 °F (36.8 °C) (Temporal)   05/22/24 98 °F (36.7 °C) (Tympanic)   03/13/24 (!) 96.6 °F (35.9 °C)        BP Readings from Last 3 Encounters:   06/05/24 112/70   05/22/24 124/84   03/13/24 132/90         Pulse Readings from Last 3 Encounters:   06/05/24 59   05/22/24 74   03/13/24 73     @LASTSAO2(3)@      Physical Exam  Constitutional:       Appearance: Normal appearance. She is normal weight.   HENT:      Head: Normocephalic and atraumatic.   Eyes:      Extraocular Movements: Extraocular movements intact.      Conjunctiva/sclera: Conjunctivae normal.      Pupils: Pupils are equal, round, and reactive to light.   Cardiovascular:      Rate and Rhythm: Normal rate and regular rhythm.      Heart sounds: Normal heart sounds.   Pulmonary:      Effort: Pulmonary effort is normal.      Breath sounds: Normal breath sounds.   Abdominal:      General: Abdomen is flat. Bowel sounds are normal.      Palpations: Abdomen is soft.   Musculoskeletal:         General: Normal range of motion.      Cervical back: Normal range of motion and neck supple.   Skin:     General: Skin is warm and dry.    Neurological:      General: No focal deficit present.      Mental Status: She is alert and oriented to person, place, and time. Mental status is at baseline.     She has no significant adenopathy in the axillary cervical or inguinal areas.  She is moderately overweight      Goals and Barriers:  Current Goal: Prolong Survival from Cancer.   Barriers: None.      Patient's Capacity to Self Care:  Patient is able to self care.    Code Status: [unfilled]  Advance Directive and Living Will:      Power of :

## 2024-06-17 ENCOUNTER — OFFICE VISIT (OUTPATIENT)
Dept: FAMILY MEDICINE CLINIC | Facility: CLINIC | Age: 64
End: 2024-06-17
Payer: COMMERCIAL

## 2024-06-17 VITALS
WEIGHT: 200 LBS | DIASTOLIC BLOOD PRESSURE: 82 MMHG | SYSTOLIC BLOOD PRESSURE: 130 MMHG | BODY MASS INDEX: 35.44 KG/M2 | OXYGEN SATURATION: 96 % | HEIGHT: 63 IN | TEMPERATURE: 97.3 F | HEART RATE: 66 BPM

## 2024-06-17 DIAGNOSIS — C85.90 STAGE 4 LOW GRADE LYMPHOMA (HCC): ICD-10-CM

## 2024-06-17 DIAGNOSIS — E28.39 MENOPAUSE OVARIAN FAILURE: ICD-10-CM

## 2024-06-17 DIAGNOSIS — Z12.31 ENCOUNTER FOR SCREENING MAMMOGRAM FOR MALIGNANT NEOPLASM OF BREAST: ICD-10-CM

## 2024-06-17 PROBLEM — R23.2 HOT FLASHES: Status: RESOLVED | Noted: 2023-06-06 | Resolved: 2024-06-17

## 2024-06-17 PROBLEM — M25.561 ACUTE PAIN OF RIGHT KNEE: Status: RESOLVED | Noted: 2024-03-13 | Resolved: 2024-06-17

## 2024-06-17 PROCEDURE — G2211 COMPLEX E/M VISIT ADD ON: HCPCS | Performed by: FAMILY MEDICINE

## 2024-06-17 PROCEDURE — 99213 OFFICE O/P EST LOW 20 MIN: CPT | Performed by: FAMILY MEDICINE

## 2024-06-17 RX ORDER — PHENTERMINE HYDROCHLORIDE 15 MG/1
15 CAPSULE ORAL EVERY MORNING
Qty: 30 CAPSULE | Refills: 0 | Status: SHIPPED | OUTPATIENT
Start: 2024-06-17

## 2024-06-17 NOTE — PROGRESS NOTES
Ambulatory Visit  Name: Beverley Paul      : 1960      MRN: [id number]  Encounter Provider: Delio Salvador MD  Encounter Date: 2024   Encounter department: [facility]    Assessment & Plan   1. BMI 35.0-35.9,adult  After discussing risks and benefits of medication along with side effects will start the following:   -     phentermine 15 MG capsule; Take 1 capsule (15 mg total) by mouth every morning  2. Encounter for screening mammogram for malignant neoplasm of breast  -     Mammo screening bilateral w 3d & cad; Future  3. Menopause ovarian failure  -     DXA bone density spine hip and pelvis; Future; Expected date: 2024  4. Stage 4 low grade lymphoma (HCC)  In remission    F/U in 6 months       History of Present Illness     Patient is here for a follow up.  She is interested in taking phentermine. Took it in the past which helped lose weight however she gained it back. No side effects.  Has a hx of Lymphoma in remission.      Review of Systems   Constitutional:  Negative for activity change, appetite change, fatigue and fever.   HENT:  Negative for congestion and ear discharge.    Respiratory:  Negative for cough and shortness of breath.    Cardiovascular:  Negative for chest pain and palpitations.   Gastrointestinal:  Negative for diarrhea and nausea.   Musculoskeletal:  Negative for arthralgias and back pain.   Skin:  Negative for color change and rash.   Neurological:  Negative for dizziness and headaches.   Psychiatric/Behavioral:  Negative for agitation and behavioral problems.      Past Medical History:   Diagnosis Date   • Anxiety    • Depression    • Lymphoma (HCC) 2022   • Right trigger finger      Past Surgical History:   Procedure Laterality Date   • ANKLE HARDWARE REMOVAL     • CHOLECYSTECTOMY     • HYSTERECTOMY     • IR BIOPSY BONE MARROW  2022   • US GUIDED BREAST BIOPSY RIGHT COMPLETE Right 2022   • US GUIDED BREAST LYMPH NODE BIOPSY LEFT Left  "9/8/2023     Family History   Problem Relation Age of Onset   • Hypertension Mother    • Breast cancer Maternal Grandmother 60        unknown age   • Diabetes Brother    • Colon cancer Neg Hx    • Ovarian cancer Neg Hx    • Uterine cancer Neg Hx    • Cervical cancer Neg Hx      Social History     Tobacco Use   • Smoking status: Never   • Smokeless tobacco: Never   Vaping Use   • Vaping status: Never Used   Substance and Sexual Activity   • Alcohol use: Not Currently   • Drug use: Never   • Sexual activity: Not Currently     Partners: Male     Current Outpatient Medications on File Prior to Visit   Medication Sig   • Ascorbic Acid (vitamin C) 1000 MG tablet Take 1 tablet by mouth daily   • Diclofenac Sodium (VOLTAREN) 1 % Apply 2 g topically 4 (four) times a day   • Multiple Vitamin (multivitamin stress formula) Take 1 tablet by mouth daily   • Omega-3 Fatty Acids (fish oil) 1,000 mg Take 1,000 mg by mouth daily   • omeprazole (PriLOSEC) 40 MG capsule TAKE 1 CAPSULE (40 MG TOTAL) BY MOUTH DAILY.   • VITAMIN D PO Take by mouth   • [DISCONTINUED] phentermine 15 MG capsule Take 1 capsule (15 mg total) by mouth every morning (Patient not taking: Reported on 6/5/2024)     Allergies   Allergen Reactions   • Benadryl [Diphenhydramine] Anaphylaxis   • Erythromycin Hives     Immunization History   Administered Date(s) Administered   • COVID-19 Pfizer vac (Reymundo-sucrose, gray cap) 12 yr+ IM 02/18/2022, 03/11/2022   • Influenza, recombinant, quadrivalent,injectable, preservative free 10/22/2021   • Pneumococcal Polysaccharide PPV23 11/05/2021     Objective     /82 (BP Location: Left arm, Patient Position: Sitting, Cuff Size: Large)   Pulse 66   Temp (!) 97.3 °F (36.3 °C)   Ht 5' 3\" (1.6 m)   Wt 90.7 kg (200 lb)   SpO2 96%   BMI 35.43 kg/m²     Physical Exam  Vitals and nursing note reviewed.   Constitutional:       General: She is not in acute distress.     Appearance: She is well-developed.   HENT:      Head: " Normocephalic and atraumatic.   Eyes:      Conjunctiva/sclera: Conjunctivae normal.   Cardiovascular:      Rate and Rhythm: Normal rate and regular rhythm.      Heart sounds: No murmur heard.  Pulmonary:      Effort: Pulmonary effort is normal. No respiratory distress.      Breath sounds: Normal breath sounds.   Abdominal:      Palpations: Abdomen is soft.      Tenderness: There is no abdominal tenderness.   Musculoskeletal:         General: No swelling.      Cervical back: Neck supple.   Skin:     General: Skin is warm and dry.      Capillary Refill: Capillary refill takes less than 2 seconds.   Neurological:      Mental Status: She is alert.   Psychiatric:         Mood and Affect: Mood normal.       Administrative Statements   I have spent a total time of 15 minutes on 06/17/24 In caring for this patient including Risks and benefits of tx options.

## 2024-06-28 ENCOUNTER — HOSPITAL ENCOUNTER (OUTPATIENT)
Dept: MAMMOGRAPHY | Facility: CLINIC | Age: 64
End: 2024-06-28
Payer: COMMERCIAL

## 2024-06-28 VITALS — BODY MASS INDEX: 35.44 KG/M2 | WEIGHT: 200 LBS | HEIGHT: 63 IN

## 2024-06-28 DIAGNOSIS — Z12.31 ENCOUNTER FOR SCREENING MAMMOGRAM FOR MALIGNANT NEOPLASM OF BREAST: ICD-10-CM

## 2024-06-28 PROCEDURE — 77063 BREAST TOMOSYNTHESIS BI: CPT

## 2024-06-28 PROCEDURE — 77067 SCR MAMMO BI INCL CAD: CPT

## 2024-07-22 ENCOUNTER — HOSPITAL ENCOUNTER (OUTPATIENT)
Dept: RADIOLOGY | Facility: MEDICAL CENTER | Age: 64
Discharge: HOME | End: 2024-07-22
Payer: COMMERCIAL

## 2024-07-22 DIAGNOSIS — E28.39 MENOPAUSE OVARIAN FAILURE: ICD-10-CM

## 2024-07-22 PROCEDURE — 77080 DXA BONE DENSITY AXIAL: CPT

## 2024-10-23 DIAGNOSIS — K21.00 GASTROESOPHAGEAL REFLUX DISEASE WITH ESOPHAGITIS WITHOUT HEMORRHAGE: ICD-10-CM

## 2024-10-23 RX ORDER — OMEPRAZOLE 40 MG/1
40 CAPSULE, DELAYED RELEASE ORAL DAILY
Qty: 90 CAPSULE | Refills: 1 | Status: SHIPPED | OUTPATIENT
Start: 2024-10-23

## 2024-12-03 ENCOUNTER — TELEPHONE (OUTPATIENT)
Dept: HEMATOLOGY ONCOLOGY | Facility: CLINIC | Age: 64
End: 2024-12-03

## 2024-12-03 DIAGNOSIS — C85.90 STAGE 4 LOW GRADE LYMPHOMA (HCC): ICD-10-CM

## 2024-12-03 DIAGNOSIS — C91.10 CLL (CHRONIC LYMPHOCYTIC LEUKEMIA) (HCC): Primary | ICD-10-CM

## 2024-12-03 DIAGNOSIS — D72.820 MONOCLONAL B-CELL LYMPHOCYTOSIS WITH IMMUNOPHENOTYPE NOT LIKE CHRONIC LYMPHOCYTIC LEUKEMIA: ICD-10-CM

## 2024-12-03 NOTE — TELEPHONE ENCOUNTER
LMOM anson Lowery regarding labs that need to be completed for upcoming appt. Provided hope line # in case pt may need to r/s.   Will send my chart msg as patient has been active

## 2024-12-04 ENCOUNTER — APPOINTMENT (OUTPATIENT)
Dept: LAB | Facility: CLINIC | Age: 64
End: 2024-12-04
Payer: COMMERCIAL

## 2024-12-04 DIAGNOSIS — D72.820 MONOCLONAL B-CELL LYMPHOCYTOSIS WITH IMMUNOPHENOTYPE NOT LIKE CHRONIC LYMPHOCYTIC LEUKEMIA: ICD-10-CM

## 2024-12-04 DIAGNOSIS — C85.90 STAGE 4 LOW GRADE LYMPHOMA (HCC): ICD-10-CM

## 2024-12-04 DIAGNOSIS — C91.10 CLL (CHRONIC LYMPHOCYTIC LEUKEMIA) (HCC): ICD-10-CM

## 2024-12-04 LAB
ALBUMIN SERPL BCG-MCNC: 4.3 G/DL (ref 3.5–5)
ALP SERPL-CCNC: 90 U/L (ref 34–104)
ALT SERPL W P-5'-P-CCNC: 11 U/L (ref 7–52)
ANION GAP SERPL CALCULATED.3IONS-SCNC: 11 MMOL/L (ref 4–13)
ANISOCYTOSIS BLD QL SMEAR: PRESENT
AST SERPL W P-5'-P-CCNC: 16 U/L (ref 13–39)
BASOPHILS # BLD MANUAL: 0 THOUSAND/UL (ref 0–0.1)
BASOPHILS NFR MAR MANUAL: 0 % (ref 0–1)
BILIRUB SERPL-MCNC: 0.88 MG/DL (ref 0.2–1)
BUN SERPL-MCNC: 16 MG/DL (ref 5–25)
CALCIUM SERPL-MCNC: 9.9 MG/DL (ref 8.4–10.2)
CHLORIDE SERPL-SCNC: 108 MMOL/L (ref 96–108)
CO2 SERPL-SCNC: 24 MMOL/L (ref 21–32)
CREAT SERPL-MCNC: 0.91 MG/DL (ref 0.6–1.3)
EOSINOPHIL # BLD MANUAL: 0.26 THOUSAND/UL (ref 0–0.4)
EOSINOPHIL NFR BLD MANUAL: 2 % (ref 0–6)
ERYTHROCYTE [DISTWIDTH] IN BLOOD BY AUTOMATED COUNT: 16.3 % (ref 11.6–15.1)
FERRITIN SERPL-MCNC: 4 NG/ML (ref 11–307)
GFR SERPL CREATININE-BSD FRML MDRD: 67 ML/MIN/1.73SQ M
GLUCOSE P FAST SERPL-MCNC: 94 MG/DL (ref 65–99)
HCT VFR BLD AUTO: 29.8 % (ref 34.8–46.1)
HGB BLD-MCNC: 8.3 G/DL (ref 11.5–15.4)
HYPERCHROMIA BLD QL SMEAR: PRESENT
IRON SATN MFR SERPL: 3 % (ref 15–50)
IRON SERPL-MCNC: 17 UG/DL (ref 50–212)
LYMPHOCYTES # BLD AUTO: 40 % (ref 14–44)
LYMPHOCYTES # BLD AUTO: 6.35 THOUSAND/UL (ref 0.6–4.47)
MCH RBC QN AUTO: 20.5 PG (ref 26.8–34.3)
MCHC RBC AUTO-ENTMCNC: 27.9 G/DL (ref 31.4–37.4)
MCV RBC AUTO: 74 FL (ref 82–98)
METAMYELOCYTE ABSOLUTE CT: 0.13 THOUSAND/UL (ref 0–0.1)
METAMYELOCYTES NFR BLD MANUAL: 1 % (ref 0–1)
MICROCYTES BLD QL AUTO: PRESENT
MONOCYTES # BLD AUTO: 0.26 THOUSAND/UL (ref 0–1.22)
MONOCYTES NFR BLD: 2 % (ref 4–12)
NEUTROPHILS # BLD MANUAL: 6.22 THOUSAND/UL (ref 1.85–7.62)
NEUTS SEG NFR BLD AUTO: 47 % (ref 43–75)
OVALOCYTES BLD QL SMEAR: PRESENT
PLATELET # BLD AUTO: 253 THOUSANDS/UL (ref 149–390)
PLATELET BLD QL SMEAR: ADEQUATE
POIKILOCYTOSIS BLD QL SMEAR: PRESENT
POLYCHROMASIA BLD QL SMEAR: PRESENT
POTASSIUM SERPL-SCNC: 4 MMOL/L (ref 3.5–5.3)
PROT SERPL-MCNC: 7.1 G/DL (ref 6.4–8.4)
RBC # BLD AUTO: 4.05 MILLION/UL (ref 3.81–5.12)
RBC MORPH BLD: PRESENT
RETICS # AUTO: NORMAL 10*3/UL (ref 14097–95744)
RETICS # CALC: 1.5 % (ref 0.37–1.87)
SODIUM SERPL-SCNC: 143 MMOL/L (ref 135–147)
TIBC SERPL-MCNC: 675 UG/DL (ref 250–450)
UIBC SERPL-MCNC: 658 UG/DL (ref 155–355)
VARIANT LYMPHS # BLD AUTO: 8 %
WBC # BLD AUTO: 13.23 THOUSAND/UL (ref 4.31–10.16)

## 2024-12-04 PROCEDURE — 85007 BL SMEAR W/DIFF WBC COUNT: CPT

## 2024-12-04 PROCEDURE — 82728 ASSAY OF FERRITIN: CPT

## 2024-12-04 PROCEDURE — 85027 COMPLETE CBC AUTOMATED: CPT

## 2024-12-04 PROCEDURE — 36415 COLL VENOUS BLD VENIPUNCTURE: CPT

## 2024-12-04 PROCEDURE — 85045 AUTOMATED RETICULOCYTE COUNT: CPT

## 2024-12-04 PROCEDURE — 80053 COMPREHEN METABOLIC PANEL: CPT

## 2024-12-04 PROCEDURE — 83550 IRON BINDING TEST: CPT

## 2024-12-04 PROCEDURE — 83540 ASSAY OF IRON: CPT

## 2024-12-05 ENCOUNTER — TELEPHONE (OUTPATIENT)
Dept: HEMATOLOGY ONCOLOGY | Facility: CLINIC | Age: 64
End: 2024-12-05

## 2024-12-05 ENCOUNTER — OFFICE VISIT (OUTPATIENT)
Dept: HEMATOLOGY ONCOLOGY | Facility: CLINIC | Age: 64
End: 2024-12-05
Payer: COMMERCIAL

## 2024-12-05 VITALS
TEMPERATURE: 97.3 F | HEIGHT: 63 IN | WEIGHT: 194 LBS | OXYGEN SATURATION: 96 % | DIASTOLIC BLOOD PRESSURE: 80 MMHG | RESPIRATION RATE: 18 BRPM | HEART RATE: 81 BPM | BODY MASS INDEX: 34.38 KG/M2 | SYSTOLIC BLOOD PRESSURE: 132 MMHG

## 2024-12-05 DIAGNOSIS — C85.90 STAGE 4 LOW GRADE LYMPHOMA (HCC): ICD-10-CM

## 2024-12-05 DIAGNOSIS — Z86.2 HISTORY OF IRON DEFICIENCY ANEMIA: Primary | ICD-10-CM

## 2024-12-05 DIAGNOSIS — C91.10 CLL (CHRONIC LYMPHOCYTIC LEUKEMIA) (HCC): Primary | ICD-10-CM

## 2024-12-05 PROCEDURE — 99211 OFF/OP EST MAY X REQ PHY/QHP: CPT | Performed by: INTERNAL MEDICINE

## 2024-12-05 RX ORDER — SODIUM CHLORIDE 9 MG/ML
20 INJECTION, SOLUTION INTRAVENOUS ONCE
Status: CANCELLED | OUTPATIENT
Start: 2024-12-12

## 2024-12-05 RX ORDER — SODIUM CHLORIDE 9 MG/ML
20 INJECTION, SOLUTION INTRAVENOUS ONCE
OUTPATIENT
Start: 2024-12-12

## 2024-12-05 NOTE — PROGRESS NOTES
Beverleybrandee Paul  1960  [facility] [place]  [address] [address]    Chief Complaint   Patient presents with    Follow-up     Came for F/U of CLL and lab work , complaining of generalized weakness       Oncology History   Stage 4 low grade lymphoma (HCC)   1/12/2022 Initial Diagnosis    October 2021 patient had a white count showing 13.7, hemoglobin 8.5, MCV 70, platelet count 257, normal differential.  Absolute lymphocytes 5.7.  Iron saturation 5%.  November 2021 flow cytometry showed MBL, nonspecific phenotype, clonal B cells 2.1.  January 12, 2022 bone marrow biopsy showed low-grade B-cell lymphoma, nonspecific phenotype, 30-35% of 65-70% cellular marrow.  NexGen sequencing showed no mutations.  Karyotype 47 XX trisomy 12.  Otherwise normal maturation.         History of Present Illness:  -No ref. provider found:  - 63-year-old female with a history of low-grade B-cell lymphoma CLL/SLL.  Was found in the left axillary node on biopsy.  She is doing well.  Her counts are stable.  She has no major swelling or edema.  She has no fever chills weight loss.  No bruising or bleeding.  We talked about treatment. She  came today with  generalized weakness and easy fatigability , I noticed today lab that there is marked microcytic anemia , low iron and ferritin and very high TIBC   No h/o bleeding per rectum or black stool   The patient had EGD and coloscopy one year ago   Review of Systems     Patient Active Problem List   Diagnosis    Gastroesophageal reflux disease    Stage 4 low grade lymphoma (HCC)    History of iron deficiency anemia    Anxiety and depression    Acquired pes planus    Cervical transverse process fracture, subsequent encounter    Closed displaced fracture of lateral malleolus of left fibula    Closed fracture of medial malleolus of left tibia    Detached retina, left    Heartburn    Left renal mass    Optic neuropathy,  left    Kidney cysts    Obesity, morbid (HCC)    Mass of right submandibular region    BMI 35.0-35.9,adult     Past Medical History:   Diagnosis Date    Anxiety     Depression     Lymphoma (HCC) 02/2022    Right trigger finger      Past Surgical History:   Procedure Laterality Date    ANKLE HARDWARE REMOVAL      CHOLECYSTECTOMY      HYSTERECTOMY      IR BIOPSY BONE MARROW  1/12/2022    US GUIDED BREAST BIOPSY RIGHT COMPLETE Right 4/5/2022    US GUIDED BREAST LYMPH NODE BIOPSY LEFT Left 9/8/2023     Family History   Problem Relation Age of Onset    Hypertension Mother     Breast cancer Maternal Grandmother 60        unknown age    Diabetes Brother     Colon cancer Neg Hx     Ovarian cancer Neg Hx     Uterine cancer Neg Hx     Cervical cancer Neg Hx      Social History     Socioeconomic History    Marital status: Legally      Spouse name: Not on file    Number of children: Not on file    Years of education: Not on file    Highest education level: Not on file   Occupational History    Not on file   Tobacco Use    Smoking status: Never    Smokeless tobacco: Never   Vaping Use    Vaping status: Never Used   Substance and Sexual Activity    Alcohol use: Not Currently    Drug use: Never    Sexual activity: Not Currently     Partners: Male   Other Topics Concern    Not on file   Social History Narrative    Not on file     Social Drivers of Health     Financial Resource Strain: Not on file   Food Insecurity: Patient Declined (3/13/2024)    Nursing - Inadequate Food Risk Classification     Worried About Running Out of Food in the Last Year: Patient declined     Ran Out of Food in the Last Year: Patient declined     Ran Out of Food in the Last Year: Not on file   Transportation Needs: Patient Declined (3/13/2024)    PRAPARE - Transportation     Lack of Transportation (Medical): Patient declined     Lack of Transportation (Non-Medical): Patient declined   Physical Activity: Not on file   Stress: Not on file   Social  "Connections: Unknown (6/18/2024)    Received from [email or web address]     How often do you feel lonely or isolated from those around you? (Adult - for ages 18 years and over): Not on file   Intimate Partner Violence: Not on file   Housing Stability: Patient Declined (3/13/2024)    Housing Stability Vital Sign     Unable to Pay for Housing in the Last Year: Patient declined     Number of Times Moved in the Last Year: Not on file     Homeless in the Last Year: Patient declined       Current Outpatient Medications:     Ascorbic Acid (vitamin C) 1000 MG tablet, Take 1 tablet by mouth daily, Disp: , Rfl:     Diclofenac Sodium (VOLTAREN) 1 %, Apply 2 g topically 4 (four) times a day, Disp: 100 g, Rfl: 3    Multiple Vitamin (multivitamin stress formula), Take 1 tablet by mouth daily, Disp: , Rfl:     Omega-3 Fatty Acids (fish oil) 1,000 mg, Take 1,000 mg by mouth daily, Disp: , Rfl:     omeprazole (PriLOSEC) 40 MG capsule, TAKE 1 CAPSULE (40 MG TOTAL) BY MOUTH DAILY., Disp: 90 capsule, Rfl: 1    phentermine 15 MG capsule, Take 1 capsule (15 mg total) by mouth every morning, Disp: 30 capsule, Rfl: 0    VITAMIN D PO, Take by mouth, Disp: , Rfl:   Allergies   Allergen Reactions    Benadryl [Diphenhydramine] Anaphylaxis    Erythromycin Hives     Vitals:    12/05/24 0950   BP: 132/80   Pulse: 81   Resp: 18   Temp: (!) 97.3 °F (36.3 °C)   SpO2: 96%         /80 (BP Location: Left arm, Patient Position: Sitting, Cuff Size: Adult)   Pulse 81   Temp (!) 97.3 °F (36.3 °C) (Temporal)   Resp 18   Ht 5' 3\" (1.6 m)   Wt 88 kg (194 lb)   SpO2 96%   BMI 34.37 kg/m²     General Appearance:    Alert, cooperative, no distress, pallor    Head:    Normocephalic, without obvious abnormality, atraumatic   Eyes:    PERRL, conjunctiva/corneas clear, EOM's intact, fundi     benign, both eyes        Ears:    Normal TM's and external ear canals, both ears   Nose:   Nares normal, septum midline, mucosa normal, no drainage    or " sinus tenderness   Throat:   Lips, mucosa, and tongue normal; teeth and gums normal   Neck:   Supple, symmetrical, trachea midline, no adenopathy;        thyroid:  No enlargement/tenderness/nodules; no carotid    bruit or JVD   Back:     Symmetric, no curvature, ROM normal, no CVA tenderness   Lungs:     Clear to auscultation bilaterally, respirations unlabored   Chest wall:    No tenderness or deformity   Heart:    Regular rate and rhythm, S1 and S2 normal, no murmur, rub    or gallop   Abdomen:     Soft, non-tender, bowel sounds active all four quadrants,     no masses, no organomegaly           Extremities:   Extremities normal, atraumatic, no cyanosis or edema   Pulses:   2+ and symmetric all extremities   Skin:   Skin color, texture, turgor normal, no rashes or lesions   Lymph nodes:   Cervical, supraclavicular, and axillary nodes normal   Neurologic:   CNII-XII intact. Normal strength, sensation and reflexes       throughout          Performance Status: ECOG/Zubrod/WHO: 1 - Symptomatic but completely ambulatory    Labs:    Result Notes            Component  Ref Range & Units (hover) 12/4/24 10:05 AM 3/13/24 10:15 AM 1/26/24 10:23 AM 12/6/23 10:02 AM 11/27/23  8:32 AM 5/9/23  8:28 AM 10/24/22  9:07 AM   WBC 13.23 High  13.62 High  11.73 High  11.02 High  12.00 High  11.93 High  13.91 High    RBC 4.05 5.00 4.99 4.77 4.90 4.76 4.77   Hemoglobin 8.3 Low  12.9 12.6 12.6 12.4 12.0 12.1   Hematocrit 29.8 Low  40.8 42.5 40.1 41.3 39.7 39.5   MCV 74 Low  82 85 84 84 83 83   MCH 20.5 Low  25.8 Low  25.3 Low  26.4 Low  25.3 Low  25.2 Low  25.4 Low    MCHC 27.9 Low  31.6 29.6 Low  31.4 30.0 Low  30.2 Low  30.6 Low    RDW 16.3 High  15.3 High  15.1 14.6 14.8 15.3 High  14.9   Platelets 253 222 208 208 213 192 216   MPV  14.0 High  R 14.2 High  R 14.1 High  R 14.4 High  R 13.8 High  R 13.4 High  R   Absolute Immature Grans    0.03 R  0.03 R 0.04 R   Absolute Lymphocytes    4.93 High  R  5.12 High  R 7.04 High  R   Absolute  Monocytes    0.63 R  0.72 R 0.82 R   Eosinophils Absolute    0.14 R  0.14 R 0.17 R   Basophils Absolute    0.10 R  0.11 High  R 0.13 High  R   nRBC    0 R  0 R 0 R   Segmented %    47 R  49 R 41 Low  R   Immature Grans %    0 R  0 R 0 R   Lymphocytes %    45 High  R  43 R 51 High  R   Monocytes %    6 R  6 R 6 R   Eosinophils Relative    1 R  1 R 1 R   Basophils Relative    1 R  1 R 1 R   Absolute Neutrophils    5.19 R  5.81 R 5.71 R            ntains abnormal data TIBC Panel (incl. Iron, TIBC, % Iron Saturation)  Order: [id number] - Part of Panel Order [id number]   Status: Final result       Next appt: 12/20/2024 at 08:00 AM in [facility] (Delio Salvador MD)       Dx: CLL (chronic lymphocytic leukemia) (H...    Test Result Released: Yes (seen)    0 Result Notes          Component  Ref Range & Units (hover) 12/4/24 10:05 AM 3/13/24 10:15 AM 4/18/22  9:18 AM 11/30/21 12:38 PM 11/5/21  9:25 AM   Iron Saturation 3 Low  9 Low  21 5 Low  5 Low    TIBC 675 High  421 351 442 502 High    Iron 17 Low  36 Low  CM 74 R, CM 20 Low  R, CM 26 Low  R, CM   Comment: Patients treated with metal-binding drugs (ie. Deferoxamine) may have depressed iron values.   UIBC 658 High  385 High                 Specimen Collected: 12/04/24 10:05 AM Last Resulted: 12/04/24 10:48 PM        Lab Flowsheet        Order Details        View Encounter        Lab and Collection Details        Routing        Result History     View All Conversations on this Encounter     CM=Additional comments  R=Reference range differs from most recent result in table     Result Care Coordination      Patient Communication     Add Comments   Seen Back to Top      Contains abnormal data Ferritin  Order: [id number] - Part of Panel Order [id number]   Status: Final result       Next appt: 12/20/2024 at 08:00 AM in [facility] (Delio Salvador MD)       Dx: CLL (chronic lymphocytic leukemia) (H...    Test Result Released: Yes (seen)    0 Result Notes          Component  Ref  Range & Units (hover) 12/4/24 10:05 AM 3/13/24 10:15 AM 4/18/22  9:18 AM 11/30/21 12:38 PM 11/5/21  9:25 AM   Ferritin 4 Low  19 61 R 9 R 4 Low  R               Narrative    Imaging  No results found.  I reviewed the above laboratory and imaging data.      Discussion/Summary:    1 low-grade B-cell lymphoma/CLL/SLL.  Trisomy 12 positive I GVH mutated.  Plan: no need to start treatment for now     2) iron diff anemia : unclear etiology , the patient had EGD and coloscopy one year ago , she received IV iron 2 years ago     Will repeat IV iron   F/U in 3 months with repeat lab       Rajendra Lopez MD

## 2024-12-19 RX ORDER — SODIUM CHLORIDE 9 MG/ML
20 INJECTION, SOLUTION INTRAVENOUS ONCE
Status: CANCELLED | OUTPATIENT
Start: 2024-12-23

## 2024-12-20 ENCOUNTER — APPOINTMENT (OUTPATIENT)
Dept: LAB | Facility: HOSPITAL | Age: 64
End: 2024-12-20
Attending: INTERNAL MEDICINE
Payer: COMMERCIAL

## 2024-12-20 ENCOUNTER — OFFICE VISIT (OUTPATIENT)
Dept: FAMILY MEDICINE CLINIC | Facility: CLINIC | Age: 64
End: 2024-12-20
Payer: COMMERCIAL

## 2024-12-20 VITALS
WEIGHT: 193 LBS | SYSTOLIC BLOOD PRESSURE: 116 MMHG | HEIGHT: 63 IN | HEART RATE: 80 BPM | TEMPERATURE: 96.4 F | BODY MASS INDEX: 34.2 KG/M2 | DIASTOLIC BLOOD PRESSURE: 80 MMHG

## 2024-12-20 DIAGNOSIS — D50.9 IRON DEFICIENCY ANEMIA, UNSPECIFIED IRON DEFICIENCY ANEMIA TYPE: ICD-10-CM

## 2024-12-20 DIAGNOSIS — D53.9 NUTRITIONAL ANEMIA, UNSPECIFIED: ICD-10-CM

## 2024-12-20 DIAGNOSIS — D64.9 SYMPTOMATIC ANEMIA: Primary | ICD-10-CM

## 2024-12-20 DIAGNOSIS — D64.9 SYMPTOMATIC ANEMIA: ICD-10-CM

## 2024-12-20 DIAGNOSIS — C91.10 CLL (CHRONIC LYMPHOCYTIC LEUKEMIA) (HCC): ICD-10-CM

## 2024-12-20 LAB
ALBUMIN SERPL BCG-MCNC: 4.4 G/DL (ref 3.5–5)
ALP SERPL-CCNC: 85 U/L (ref 34–104)
ALT SERPL W P-5'-P-CCNC: 9 U/L (ref 7–52)
ANION GAP SERPL CALCULATED.3IONS-SCNC: 5 MMOL/L (ref 4–13)
AST SERPL W P-5'-P-CCNC: 12 U/L (ref 13–39)
BACTERIA UR QL AUTO: ABNORMAL /HPF
BASOPHILS # BLD MANUAL: 0 THOUSAND/UL (ref 0–0.1)
BASOPHILS NFR MAR MANUAL: 0 % (ref 0–1)
BILIRUB SERPL-MCNC: 0.87 MG/DL (ref 0.2–1)
BILIRUB UR QL STRIP: NEGATIVE
BUN SERPL-MCNC: 13 MG/DL (ref 5–25)
CALCIUM SERPL-MCNC: 9.9 MG/DL (ref 8.4–10.2)
CHLORIDE SERPL-SCNC: 106 MMOL/L (ref 96–108)
CLARITY UR: CLEAR
CO2 SERPL-SCNC: 27 MMOL/L (ref 21–32)
COLOR UR: ABNORMAL
CREAT SERPL-MCNC: 0.84 MG/DL (ref 0.6–1.3)
EOSINOPHIL # BLD MANUAL: 0.25 THOUSAND/UL (ref 0–0.4)
EOSINOPHIL NFR BLD MANUAL: 2 % (ref 0–6)
ERYTHROCYTE [DISTWIDTH] IN BLOOD BY AUTOMATED COUNT: 16.7 % (ref 11.6–15.1)
FERRITIN SERPL-MCNC: 3 NG/ML (ref 11–307)
FOLATE SERPL-MCNC: 10.7 NG/ML
GFR SERPL CREATININE-BSD FRML MDRD: 74 ML/MIN/1.73SQ M
GLUCOSE P FAST SERPL-MCNC: 99 MG/DL (ref 65–99)
GLUCOSE UR STRIP-MCNC: NEGATIVE MG/DL
HCT VFR BLD AUTO: 28.5 % (ref 34.8–46.1)
HGB BLD-MCNC: 8 G/DL (ref 11.5–15.4)
HGB UR QL STRIP.AUTO: NEGATIVE
HYALINE CASTS #/AREA URNS LPF: ABNORMAL /LPF
HYPERCHROMIA BLD QL SMEAR: PRESENT
IRON SATN MFR SERPL: 5 % (ref 15–50)
IRON SERPL-MCNC: 26 UG/DL (ref 50–212)
KETONES UR STRIP-MCNC: NEGATIVE MG/DL
LEUKOCYTE ESTERASE UR QL STRIP: ABNORMAL
LYMPHOCYTES # BLD AUTO: 30 % (ref 14–44)
LYMPHOCYTES # BLD AUTO: 4 THOUSAND/UL (ref 0.6–4.47)
MCH RBC QN AUTO: 19.7 PG (ref 26.8–34.3)
MCHC RBC AUTO-ENTMCNC: 28.1 G/DL (ref 31.4–37.4)
MCV RBC AUTO: 70 FL (ref 82–98)
MICROCYTES BLD QL AUTO: PRESENT
MONOCYTES # BLD AUTO: 0.38 THOUSAND/UL (ref 0–1.22)
MONOCYTES NFR BLD: 3 % (ref 4–12)
MUCOUS THREADS UR QL AUTO: ABNORMAL
NEUTROPHILS # BLD MANUAL: 7.88 THOUSAND/UL (ref 1.85–7.62)
NEUTS SEG NFR BLD AUTO: 63 % (ref 43–75)
NITRITE UR QL STRIP: NEGATIVE
NON-SQ EPI CELLS URNS QL MICRO: ABNORMAL /HPF
OVALOCYTES BLD QL SMEAR: PRESENT
PH UR STRIP.AUTO: 5.5 [PH]
PLATELET # BLD AUTO: 244 THOUSANDS/UL (ref 149–390)
PLATELET BLD QL SMEAR: ADEQUATE
PMV BLD AUTO: 11.4 FL (ref 8.9–12.7)
POLYCHROMASIA BLD QL SMEAR: PRESENT
POTASSIUM SERPL-SCNC: 4.1 MMOL/L (ref 3.5–5.3)
PROT SERPL-MCNC: 7.3 G/DL (ref 6.4–8.4)
PROT UR STRIP-MCNC: NEGATIVE MG/DL
RBC # BLD AUTO: 4.06 MILLION/UL (ref 3.81–5.12)
RBC #/AREA URNS AUTO: ABNORMAL /HPF
SODIUM SERPL-SCNC: 138 MMOL/L (ref 135–147)
SP GR UR STRIP.AUTO: 1.01 (ref 1–1.03)
TIBC SERPL-MCNC: 526.4 UG/DL (ref 250–450)
TRANSFERRIN SERPL-MCNC: 376 MG/DL (ref 203–362)
UIBC SERPL-MCNC: 500 UG/DL (ref 155–355)
UROBILINOGEN UR STRIP-ACNC: <2 MG/DL
VARIANT LYMPHS # BLD AUTO: 2 %
VIT B12 SERPL-MCNC: 323 PG/ML (ref 180–914)
WBC # BLD AUTO: 12.5 THOUSAND/UL (ref 4.31–10.16)
WBC #/AREA URNS AUTO: ABNORMAL /HPF

## 2024-12-20 PROCEDURE — 36415 COLL VENOUS BLD VENIPUNCTURE: CPT

## 2024-12-20 PROCEDURE — 85007 BL SMEAR W/DIFF WBC COUNT: CPT

## 2024-12-20 PROCEDURE — 83550 IRON BINDING TEST: CPT

## 2024-12-20 PROCEDURE — 85027 COMPLETE CBC AUTOMATED: CPT

## 2024-12-20 PROCEDURE — 82607 VITAMIN B-12: CPT

## 2024-12-20 PROCEDURE — 82728 ASSAY OF FERRITIN: CPT

## 2024-12-20 PROCEDURE — 99214 OFFICE O/P EST MOD 30 MIN: CPT | Performed by: FAMILY MEDICINE

## 2024-12-20 PROCEDURE — 80053 COMPREHEN METABOLIC PANEL: CPT

## 2024-12-20 PROCEDURE — 83540 ASSAY OF IRON: CPT

## 2024-12-20 PROCEDURE — 81001 URINALYSIS AUTO W/SCOPE: CPT

## 2024-12-20 PROCEDURE — 82746 ASSAY OF FOLIC ACID SERUM: CPT

## 2024-12-20 PROCEDURE — 93000 ELECTROCARDIOGRAM COMPLETE: CPT | Performed by: FAMILY MEDICINE

## 2024-12-20 NOTE — ASSESSMENT & PLAN NOTE
Orders:  •  CBC and differential; Future  •  Vitamin B12; Future  •  Folate; Future  •  Urinalysis with microscopic; Future  •  POCT ECG- normal sinus rhythm

## 2024-12-20 NOTE — PROGRESS NOTES
"Name: Beverley Paul      : 1960      MRN: [id number]  Encounter Provider: Delio Salvador MD  Encounter Date: 2024   Encounter department: [facility]  :  Assessment & Plan  Symptomatic anemia    Orders:  •  CBC and differential; Future  •  Vitamin B12; Future  •  Folate; Future  •  Urinalysis with microscopic; Future  •  POCT ECG- normal sinus rhythm    Iron deficiency anemia, unspecified iron deficiency anemia type  Advised to follow up with Iron infusion       Nutritional anemia, unspecified    Orders:  •  Vitamin B12; Future  •  Folate; Future    Follow up in 3-4 months or as needed       History of Present Illness     Patient is here to follow up.  Has anemia due to iron deficiency. Sees Hematologist. She has an appt on Monday for Iron infusion. Last Hgb was 8.3 done 16 days ago. She states that she gest SOB at times no chest pain or palpitations however. Denies any blood in the stool or blood in the urine.       Review of Systems   Constitutional:  Positive for fatigue. Negative for activity change, appetite change and fever.   HENT:  Negative for congestion and ear discharge.    Respiratory:  Negative for cough and shortness of breath.    Cardiovascular:  Negative for chest pain and palpitations.   Gastrointestinal:  Negative for diarrhea and nausea.   Musculoskeletal:  Negative for arthralgias and back pain.   Skin:  Negative for color change and rash.   Neurological:  Negative for dizziness and headaches.   Psychiatric/Behavioral:  Negative for agitation and behavioral problems.        Objective   /80   Pulse 80   Temp (!) 96.4 °F (35.8 °C)   Ht 5' 3\" (1.6 m)   Wt 87.5 kg (193 lb)   BMI 34.19 kg/m²      Physical Exam  Vitals and nursing note reviewed.   Constitutional:       General: She is not in acute distress.     Appearance: She is well-developed.   HENT:      Head: Normocephalic and atraumatic.   Eyes:      Conjunctiva/sclera: Conjunctivae normal. "   Cardiovascular:      Rate and Rhythm: Normal rate and regular rhythm.      Heart sounds: No murmur heard.  Pulmonary:      Effort: Pulmonary effort is normal. No respiratory distress.      Breath sounds: Normal breath sounds.   Abdominal:      Palpations: Abdomen is soft.      Tenderness: There is no abdominal tenderness.   Musculoskeletal:         General: No swelling.      Cervical back: Neck supple.   Skin:     General: Skin is warm and dry.      Capillary Refill: Capillary refill takes less than 2 seconds.   Neurological:      Mental Status: She is alert.   Psychiatric:         Mood and Affect: Mood normal.

## 2024-12-22 ENCOUNTER — RESULTS FOLLOW-UP (OUTPATIENT)
Dept: FAMILY MEDICINE CLINIC | Facility: CLINIC | Age: 64
End: 2024-12-22

## 2024-12-22 DIAGNOSIS — R31.9 HEMATURIA, UNSPECIFIED TYPE: Primary | ICD-10-CM

## 2024-12-23 ENCOUNTER — HOSPITAL ENCOUNTER (OUTPATIENT)
Dept: INFUSION CENTER | Facility: CLINIC | Age: 64
Discharge: HOME | End: 2024-12-23
Payer: COMMERCIAL

## 2024-12-23 VITALS
RESPIRATION RATE: 20 BRPM | DIASTOLIC BLOOD PRESSURE: 72 MMHG | TEMPERATURE: 98.4 F | SYSTOLIC BLOOD PRESSURE: 136 MMHG | HEART RATE: 95 BPM

## 2024-12-23 DIAGNOSIS — Z86.2 HISTORY OF IRON DEFICIENCY ANEMIA: Primary | ICD-10-CM

## 2024-12-23 DIAGNOSIS — C85.90 STAGE 4 LOW GRADE LYMPHOMA (HCC): ICD-10-CM

## 2024-12-23 RX ORDER — SODIUM CHLORIDE 9 MG/ML
20 INJECTION, SOLUTION INTRAVENOUS ONCE
Status: COMPLETED | OUTPATIENT
Start: 2024-12-23 | End: 2024-12-23

## 2024-12-23 RX ORDER — SODIUM CHLORIDE 9 MG/ML
20 INJECTION, SOLUTION INTRAVENOUS ONCE
OUTPATIENT
Start: 2024-12-30

## 2024-12-23 RX ADMIN — SODIUM CHLORIDE 20 ML/HR: 9 INJECTION, SOLUTION INTRAVENOUS at 07:56

## 2024-12-23 RX ADMIN — IRON SUCROSE 200 MG: 20 INJECTION, SOLUTION INTRAVENOUS at 07:59

## 2024-12-23 NOTE — PROGRESS NOTES
Pt presents to clinic for venofer infusion. Pt tolerated treatment well, no complaints. AVS declined. Pt aware of next appointment on 12/30 at 8:00.

## 2024-12-30 ENCOUNTER — HOSPITAL ENCOUNTER (OUTPATIENT)
Dept: INFUSION CENTER | Facility: CLINIC | Age: 64
Discharge: HOME | End: 2024-12-30
Payer: COMMERCIAL

## 2024-12-30 VITALS
DIASTOLIC BLOOD PRESSURE: 75 MMHG | RESPIRATION RATE: 18 BRPM | HEART RATE: 93 BPM | TEMPERATURE: 97 F | SYSTOLIC BLOOD PRESSURE: 139 MMHG

## 2024-12-30 DIAGNOSIS — C85.90 STAGE 4 LOW GRADE LYMPHOMA (HCC): ICD-10-CM

## 2024-12-30 DIAGNOSIS — Z86.2 HISTORY OF IRON DEFICIENCY ANEMIA: Primary | ICD-10-CM

## 2024-12-30 PROCEDURE — 96365 THER/PROPH/DIAG IV INF INIT: CPT

## 2024-12-30 RX ORDER — SODIUM CHLORIDE 9 MG/ML
20 INJECTION, SOLUTION INTRAVENOUS ONCE
Status: COMPLETED | OUTPATIENT
Start: 2024-12-30 | End: 2024-12-30

## 2024-12-30 RX ORDER — SODIUM CHLORIDE 9 MG/ML
20 INJECTION, SOLUTION INTRAVENOUS ONCE
Status: CANCELLED | OUTPATIENT
Start: 2025-01-06

## 2024-12-30 RX ADMIN — IRON SUCROSE 200 MG: 20 INJECTION, SOLUTION INTRAVENOUS at 08:01

## 2024-12-30 RX ADMIN — SODIUM CHLORIDE 20 ML/HR: 9 INJECTION, SOLUTION INTRAVENOUS at 08:01

## 2024-12-30 NOTE — PROGRESS NOTES
Pt to clinic for Venofer. Pt offers no complaints. Pt tolerated infusion without complications. PIV removed. Pt aware of next appointment on 1/6/24 at 730. AVS printed.

## 2025-01-06 ENCOUNTER — HOSPITAL ENCOUNTER (OUTPATIENT)
Dept: INFUSION CENTER | Facility: CLINIC | Age: 65
Discharge: HOME | End: 2025-01-06
Payer: MEDICARE

## 2025-01-06 VITALS
HEART RATE: 93 BPM | DIASTOLIC BLOOD PRESSURE: 68 MMHG | TEMPERATURE: 96.3 F | RESPIRATION RATE: 17 BRPM | SYSTOLIC BLOOD PRESSURE: 149 MMHG

## 2025-01-06 DIAGNOSIS — C85.90 STAGE 4 LOW GRADE LYMPHOMA (HCC): ICD-10-CM

## 2025-01-06 DIAGNOSIS — Z86.2 HISTORY OF IRON DEFICIENCY ANEMIA: Primary | ICD-10-CM

## 2025-01-06 PROCEDURE — 96365 THER/PROPH/DIAG IV INF INIT: CPT

## 2025-01-06 RX ORDER — SODIUM CHLORIDE 9 MG/ML
20 INJECTION, SOLUTION INTRAVENOUS ONCE
Status: COMPLETED | OUTPATIENT
Start: 2025-01-06 | End: 2025-01-06

## 2025-01-06 RX ORDER — SODIUM CHLORIDE 9 MG/ML
20 INJECTION, SOLUTION INTRAVENOUS ONCE
Status: CANCELLED | OUTPATIENT
Start: 2025-01-13

## 2025-01-06 RX ADMIN — SODIUM CHLORIDE 20 ML/HR: 9 INJECTION, SOLUTION INTRAVENOUS at 07:47

## 2025-01-06 RX ADMIN — IRON SUCROSE 200 MG: 20 INJECTION, SOLUTION INTRAVENOUS at 07:47

## 2025-01-06 NOTE — PROGRESS NOTES
Pt here for venofer infusion, offering no complaints. Peripheral IV placed with positive blood return noted. Tolerated infusion without incident. Peripheral IV removed. AVS declined. Walked out in stable condition. Next appointment on 1/13/25 at 8:30am.

## 2025-01-13 ENCOUNTER — HOSPITAL ENCOUNTER (OUTPATIENT)
Dept: INFUSION CENTER | Facility: CLINIC | Age: 65
Discharge: HOME | End: 2025-01-13
Payer: MEDICARE

## 2025-01-13 VITALS
DIASTOLIC BLOOD PRESSURE: 80 MMHG | RESPIRATION RATE: 20 BRPM | HEART RATE: 80 BPM | SYSTOLIC BLOOD PRESSURE: 148 MMHG | TEMPERATURE: 97.2 F

## 2025-01-13 DIAGNOSIS — C85.90 STAGE 4 LOW GRADE LYMPHOMA (HCC): ICD-10-CM

## 2025-01-13 DIAGNOSIS — Z86.2 HISTORY OF IRON DEFICIENCY ANEMIA: Primary | ICD-10-CM

## 2025-01-13 PROCEDURE — 96365 THER/PROPH/DIAG IV INF INIT: CPT

## 2025-01-13 RX ORDER — SODIUM CHLORIDE 9 MG/ML
20 INJECTION, SOLUTION INTRAVENOUS ONCE
Status: COMPLETED | OUTPATIENT
Start: 2025-01-13 | End: 2025-01-13

## 2025-01-13 RX ORDER — SODIUM CHLORIDE 9 MG/ML
20 INJECTION, SOLUTION INTRAVENOUS ONCE
Status: CANCELLED | OUTPATIENT
Start: 2025-01-20

## 2025-01-13 RX ADMIN — IRON SUCROSE 200 MG: 20 INJECTION, SOLUTION INTRAVENOUS at 08:39

## 2025-01-13 RX ADMIN — SODIUM CHLORIDE 20 ML/HR: 9 INJECTION, SOLUTION INTRAVENOUS at 08:38

## 2025-01-13 NOTE — PROGRESS NOTES
Pt presents to clinic for venofer infusion. Pt tolerated treatment well, no complaints. AVS declined. Pt aware of next appointment on 1/20 at 9:00.

## 2025-01-20 ENCOUNTER — HOSPITAL ENCOUNTER (OUTPATIENT)
Dept: INFUSION CENTER | Facility: CLINIC | Age: 65
Discharge: HOME | End: 2025-01-20
Payer: MEDICARE

## 2025-01-20 VITALS
DIASTOLIC BLOOD PRESSURE: 92 MMHG | SYSTOLIC BLOOD PRESSURE: 142 MMHG | RESPIRATION RATE: 20 BRPM | HEART RATE: 84 BPM | TEMPERATURE: 97.2 F

## 2025-01-20 DIAGNOSIS — C85.90 STAGE 4 LOW GRADE LYMPHOMA (HCC): ICD-10-CM

## 2025-01-20 DIAGNOSIS — Z86.2 HISTORY OF IRON DEFICIENCY ANEMIA: Primary | ICD-10-CM

## 2025-01-20 PROCEDURE — 96365 THER/PROPH/DIAG IV INF INIT: CPT

## 2025-01-20 RX ORDER — SODIUM CHLORIDE 9 MG/ML
20 INJECTION, SOLUTION INTRAVENOUS ONCE
Status: COMPLETED | OUTPATIENT
Start: 2025-01-20 | End: 2025-01-20

## 2025-01-20 RX ORDER — SODIUM CHLORIDE 9 MG/ML
20 INJECTION, SOLUTION INTRAVENOUS ONCE
Status: CANCELLED | OUTPATIENT
Start: 2025-01-20

## 2025-01-20 RX ADMIN — SODIUM CHLORIDE 20 ML/HR: 9 INJECTION, SOLUTION INTRAVENOUS at 09:07

## 2025-01-20 RX ADMIN — IRON SUCROSE 200 MG: 20 INJECTION, SOLUTION INTRAVENOUS at 09:10

## 2025-01-20 NOTE — PROGRESS NOTES
Pt presents to clinic for venofer infusion. Pt tolerated treatment well, no complaints. AVS declined. Patient has completed all of her treatments, no next appointment needed.

## 2025-03-03 ENCOUNTER — TELEPHONE (OUTPATIENT)
Age: 65
End: 2025-03-03

## 2025-03-03 DIAGNOSIS — C91.10 CLL (CHRONIC LYMPHOCYTIC LEUKEMIA) (HCC): ICD-10-CM

## 2025-03-03 DIAGNOSIS — C91.10 CLL (CHRONIC LYMPHOCYTIC LEUKEMIA) (HCC): Primary | ICD-10-CM

## 2025-03-03 DIAGNOSIS — D72.820 MONOCLONAL B-CELL LYMPHOCYTOSIS WITH IMMUNOPHENOTYPE NOT LIKE CHRONIC LYMPHOCYTIC LEUKEMIA: ICD-10-CM

## 2025-03-03 DIAGNOSIS — Z86.2 HISTORY OF IRON DEFICIENCY ANEMIA: ICD-10-CM

## 2025-03-03 DIAGNOSIS — C85.90 STAGE 4 LOW GRADE LYMPHOMA (HCC): Primary | ICD-10-CM

## 2025-03-03 NOTE — TELEPHONE ENCOUNTER
Patient called to request lab orders for upcoming visit with Dr Evans on 3/6. Pt is a COURTNEY from Dr Lopez.

## 2025-03-04 ENCOUNTER — APPOINTMENT (OUTPATIENT)
Dept: LAB | Facility: CLINIC | Age: 65
End: 2025-03-04
Payer: MEDICARE

## 2025-03-04 DIAGNOSIS — C91.10 CLL (CHRONIC LYMPHOCYTIC LEUKEMIA) (HCC): ICD-10-CM

## 2025-03-04 DIAGNOSIS — Z86.2 HISTORY OF IRON DEFICIENCY ANEMIA: ICD-10-CM

## 2025-03-04 PROCEDURE — 80053 COMPREHEN METABOLIC PANEL: CPT

## 2025-03-04 PROCEDURE — 85025 COMPLETE CBC W/AUTO DIFF WBC: CPT

## 2025-03-04 PROCEDURE — 85045 AUTOMATED RETICULOCYTE COUNT: CPT

## 2025-03-04 PROCEDURE — 82728 ASSAY OF FERRITIN: CPT

## 2025-03-04 PROCEDURE — 36415 COLL VENOUS BLD VENIPUNCTURE: CPT

## 2025-03-04 PROCEDURE — 83540 ASSAY OF IRON: CPT

## 2025-03-04 PROCEDURE — 83550 IRON BINDING TEST: CPT

## 2025-03-05 LAB
ALBUMIN SERPL BCG-MCNC: 4.3 G/DL (ref 3.5–5)
ALP SERPL-CCNC: 94 U/L (ref 34–104)
ALT SERPL W P-5'-P-CCNC: 11 U/L (ref 7–52)
ANION GAP SERPL CALCULATED.3IONS-SCNC: 7 MMOL/L (ref 4–13)
AST SERPL W P-5'-P-CCNC: 14 U/L (ref 13–39)
BASOPHILS # BLD AUTO: 0.13 THOUSANDS/ÂΜL (ref 0–0.1)
BASOPHILS NFR BLD AUTO: 1 % (ref 0–1)
BILIRUB SERPL-MCNC: 0.94 MG/DL (ref 0.2–1)
BUN SERPL-MCNC: 17 MG/DL (ref 5–25)
CALCIUM SERPL-MCNC: 9.9 MG/DL (ref 8.4–10.2)
CHLORIDE SERPL-SCNC: 105 MMOL/L (ref 96–108)
CO2 SERPL-SCNC: 29 MMOL/L (ref 21–32)
CREAT SERPL-MCNC: 0.8 MG/DL (ref 0.6–1.3)
EOSINOPHIL # BLD AUTO: 0.12 THOUSAND/ÂΜL (ref 0–0.61)
EOSINOPHIL NFR BLD AUTO: 1 % (ref 0–6)
ERYTHROCYTE [DISTWIDTH] IN BLOOD BY AUTOMATED COUNT: 23.1 % (ref 11.6–15.1)
FERRITIN SERPL-MCNC: 38 NG/ML (ref 11–307)
GFR SERPL CREATININE-BSD FRML MDRD: 79 ML/MIN/1.73SQ M
GLUCOSE P FAST SERPL-MCNC: 95 MG/DL (ref 65–99)
HCT VFR BLD AUTO: 41.5 % (ref 34.8–46.1)
HGB BLD-MCNC: 12.2 G/DL (ref 11.5–15.4)
IMM GRANULOCYTES # BLD AUTO: 0.03 THOUSAND/UL (ref 0–0.2)
IMM GRANULOCYTES NFR BLD AUTO: 0 % (ref 0–2)
IRON SATN MFR SERPL: 12 % (ref 15–50)
IRON SERPL-MCNC: 48 UG/DL (ref 50–212)
LYMPHOCYTES # BLD AUTO: 5.36 THOUSANDS/ÂΜL (ref 0.6–4.47)
LYMPHOCYTES NFR BLD AUTO: 42 % (ref 14–44)
MCH RBC QN AUTO: 24.1 PG (ref 26.8–34.3)
MCHC RBC AUTO-ENTMCNC: 29.4 G/DL (ref 31.4–37.4)
MCV RBC AUTO: 82 FL (ref 82–98)
MONOCYTES # BLD AUTO: 0.67 THOUSAND/ÂΜL (ref 0.17–1.22)
MONOCYTES NFR BLD AUTO: 5 % (ref 4–12)
NEUTROPHILS # BLD AUTO: 6.53 THOUSANDS/ÂΜL (ref 1.85–7.62)
NEUTS SEG NFR BLD AUTO: 51 % (ref 43–75)
NRBC BLD AUTO-RTO: 0 /100 WBCS
PLATELET # BLD AUTO: 216 THOUSANDS/UL (ref 149–390)
POTASSIUM SERPL-SCNC: 4.3 MMOL/L (ref 3.5–5.3)
PROT SERPL-MCNC: 7 G/DL (ref 6.4–8.4)
RBC # BLD AUTO: 5.06 MILLION/UL (ref 3.81–5.12)
RETICS # AUTO: NORMAL 10*3/UL (ref 14097–95744)
RETICS # CALC: 1.13 % (ref 0.37–1.87)
SODIUM SERPL-SCNC: 141 MMOL/L (ref 135–147)
TIBC SERPL-MCNC: 414.4 UG/DL (ref 250–450)
TRANSFERRIN SERPL-MCNC: 296 MG/DL (ref 203–362)
UIBC SERPL-MCNC: 366 UG/DL (ref 155–355)
WBC # BLD AUTO: 12.84 THOUSAND/UL (ref 4.31–10.16)

## 2025-03-05 NOTE — PROGRESS NOTES
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  Remains asymptomatic..  Will continue to monitor.  Orders:    CBC and differential; Future    LD,Blood; Future    Iron Panel (Includes Ferritin, Iron Sat%, Iron, and TIBC); Future    Urinalysis with microscopic; Future    Vitamin B12/Folate, Serum Panel; Future    Iron deficiency anemia due to chronic blood loss  Recurrent.  Did have some evidence of microscopic hematuria on last urinalysis in December.  Will repeat.  Also recommend to do a follow-up ultrasound renal for follow-up of her renal cyst.  Has had colonoscopy and EGD in 2021.  Does have hiatal hernia reflux esophagitis.  Also had a video capsule endoscopy which showed a small ulcer.  Recommend following up with GI again for consideration of repeat scope?.  Did receive IV iron.  Iron indices have improved though remain in the lower range.  Hemoglobin has significantly improved to 12 g now.  From 8 g/dL.  Trial of oral iron intermittently.  If remains stable can monitor off of IV iron.  If drops again will need IV iron.  Will also look for any other concurrent deficiencies indicating malabsorption in general.  Orders:    CBC and differential; Future    LD,Blood; Future    Iron Panel (Includes Ferritin, Iron Sat%, Iron, and TIBC); Future    Urinalysis with microscopic; Future    Vitamin B12/Folate, Serum Panel; Future    iFOBT/FIT; Future    Asymptomatic microscopic hematuria  Repeat urinalysis.  Follow-up ultrasound renal.  Orders:    CBC and differential; Future    LD,Blood; Future    Iron Panel (Includes Ferritin, Iron Sat%, Iron, and TIBC); Future     Urinalysis with microscopic; Future    Vitamin B12/Folate, Serum Panel; Future    Patient verbalized understanding of the above discussion and all instructions.    Return in about 3 months (around 6/6/2025).    History of Present Illness   Chief Complaint   Patient presents with    Follow-up   Patient is 64-year-old female with history of low-grade B-cell lymphoma-CLL's/SLL biopsy-proven of bone marrow and axillary lymph node initially diagnosed in January 2022.  Trisomy 12 IGHV mutated.  On surveillance.  Also noted to have significant iron deficiency anemia at her last visit in December 2024 and received IV iron therapy with Venofer last being on 1/20/2025.    EGD 1/10/2022:5 cm hiatal hernia   Mild erosive gastritis   Fundal gland polyps     Colonoscopy 1/10/2022:Left-sided diverticulosis   No polyps     Patient also had a VCE and was found to have a small ulcer.  This workup was done because of her iron deficiency anemia.  This then improved.  Till recently when she was found to have severe iron deficiency anemia and was given IV Venofer to which she has responded overall well.  She denies any obvious bleeding per rectum melena any noticeable hematuria.  She does have history of a renal cyst.      Oncology History   Cancer Staging   No matching staging information was found for the patient.  Oncology History   Stage 4 low grade lymphoma (HCC)   1/12/2022 Initial Diagnosis    October 2021 patient had a white count showing 13.7, hemoglobin 8.5, MCV 70, platelet count 257, normal differential.  Absolute lymphocytes 5.7.  Iron saturation 5%.  November 2021 flow cytometry showed MBL, nonspecific phenotype, clonal B cells 2.1.  January 12, 2022 bone marrow biopsy showed low-grade B-cell lymphoma, nonspecific phenotype, 30-35% of 65-70% cellular marrow.  NexGen sequencing showed no mutations.  Karyotype 47 XX trisomy 12.  Otherwise normal maturation.        Pertinent Medical History     03/06/25: Patient comes for  "follow-up.  She received her IV Venofer and overall responded well to it.  She denies any significant fatigue more than the usual, no night sweats no chills no fever no weight loss.  She denies any obvious melena hematochezia or hematuria.  She does have some reflux symptoms at times and takes omeprazole.  She does have a known hiatal hernia.  She denies any bothersome lymphadenopathy.     Review of Systems   Constitutional:  Negative for chills and fever.   HENT:  Negative for ear pain and sore throat.    Eyes:  Negative for pain and visual disturbance.   Respiratory:  Negative for cough and shortness of breath.    Cardiovascular:  Negative for chest pain and palpitations.   Gastrointestinal:  Negative for abdominal pain and vomiting.   Genitourinary:  Negative for dysuria and hematuria.   Musculoskeletal:  Negative for arthralgias and back pain.   Skin:  Negative for color change and rash.   Neurological:  Negative for seizures and syncope.   All other systems reviewed and are negative.    Medical History Reviewed by provider this encounter:     .      Objective   /76 (BP Location: Left arm, Patient Position: Sitting, Cuff Size: Adult)   Pulse 80   Temp (!) 97.2 °F (36.2 °C) (Temporal)   Resp 18   Ht 5' 3\" (1.6 m)   Wt 87.3 kg (192 lb 8 oz)   SpO2 99%   BMI 34.10 kg/m²     Pain Screening:  Pain Score: 0-No pain  ECOG   0  Physical Exam  Constitutional:       Appearance: Normal appearance. She is normal weight.   HENT:      Head: Normocephalic.      Nose: Nose normal.      Mouth/Throat:      Mouth: Mucous membranes are moist.   Eyes:      Extraocular Movements: Extraocular movements intact.      Pupils: Pupils are equal, round, and reactive to light.   Cardiovascular:      Rate and Rhythm: Normal rate.      Pulses: Normal pulses.   Pulmonary:      Effort: Pulmonary effort is normal.      Breath sounds: Normal breath sounds.   Abdominal:      General: Bowel sounds are normal.      Palpations: Abdomen is " soft.      Tenderness: There is no abdominal tenderness.   Musculoskeletal:         General: Normal range of motion.      Cervical back: Normal range of motion.   Lymphadenopathy:      Cervical: Cervical adenopathy (Small submandible) present.   Skin:     General: Skin is warm.   Neurological:      General: No focal deficit present.      Mental Status: She is alert and oriented to person, place, and time. Mental status is at baseline.         Labs: I have reviewed the following labs:  Lab Results   Component Value Date/Time    WBC 12.84 (H) 03/04/2025 09:02 AM    RBC 5.06 03/04/2025 09:02 AM    Hemoglobin 12.2 03/04/2025 09:02 AM    Hematocrit 41.5 03/04/2025 09:02 AM    MCV 82 03/04/2025 09:02 AM    MCH 24.1 (L) 03/04/2025 09:02 AM    RDW 23.1 (H) 03/04/2025 09:02 AM    Platelets 216 03/04/2025 09:02 AM    Segmented % 51 03/04/2025 09:02 AM    Lymphocytes % 42 03/04/2025 09:02 AM    Monocytes % 5 03/04/2025 09:02 AM    Eosinophils Relative 1 03/04/2025 09:02 AM    Basophils Relative 1 03/04/2025 09:02 AM    Immature Grans % 0 03/04/2025 09:02 AM    Absolute Neutrophils 6.53 03/04/2025 09:02 AM     Lab Results   Component Value Date/Time    Potassium 4.3 03/04/2025 09:02 AM    Chloride 105 03/04/2025 09:02 AM    CO2 29 03/04/2025 09:02 AM    BUN 17 03/04/2025 09:02 AM    Creatinine 0.80 03/04/2025 09:02 AM    Glucose, Fasting 95 03/04/2025 09:02 AM    Calcium 9.9 03/04/2025 09:02 AM    AST 14 03/04/2025 09:02 AM    ALT 11 03/04/2025 09:02 AM    Alkaline Phosphatase 94 03/04/2025 09:02 AM    Total Protein 7.0 03/04/2025 09:02 AM    Albumin 4.3 03/04/2025 09:02 AM    Total Bilirubin 0.94 03/04/2025 09:02 AM    eGFR 79 03/04/2025 09:02 AM     Lab Results   Component Value Date/Time    Iron 48 (L) 03/04/2025 09:02 AM    Iron Saturation 12 (L) 03/04/2025 09:02 AM    Ferritin 38 03/04/2025 09:02 AM    Vitamin B-12 323 12/20/2024 09:24 AM    Folate 10.7 12/20/2024 09:24 AM        Radiology Results Review: I have reviewed  radiology reports from multiple dates including: Mammogram DEXA, CT abdomen/pelvis, procedure reports, and Ultrasound(s).    Administrative Statements   I have spent a total time of 40 minutes in caring for this patient on the day of the visit/encounter including Diagnostic results, Prognosis, Risks and benefits of tx options, Instructions for management, Risk factor reductions, Impressions, Counseling / Coordination of care, Documenting in the medical record, Reviewing/placing orders in the medical record (including tests, medications, and/or procedures), Obtaining or reviewing history  , and Communicating with other healthcare professionals .

## 2025-03-06 ENCOUNTER — OFFICE VISIT (OUTPATIENT)
Dept: HEMATOLOGY ONCOLOGY | Facility: CLINIC | Age: 65
End: 2025-03-06
Payer: MEDICARE

## 2025-03-06 VITALS
TEMPERATURE: 97.2 F | HEIGHT: 63 IN | BODY MASS INDEX: 34.11 KG/M2 | HEART RATE: 80 BPM | SYSTOLIC BLOOD PRESSURE: 118 MMHG | OXYGEN SATURATION: 99 % | WEIGHT: 192.5 LBS | RESPIRATION RATE: 18 BRPM | DIASTOLIC BLOOD PRESSURE: 76 MMHG

## 2025-03-06 DIAGNOSIS — C85.90 STAGE 4 LOW GRADE LYMPHOMA (HCC): Primary | ICD-10-CM

## 2025-03-06 DIAGNOSIS — D50.0 IRON DEFICIENCY ANEMIA DUE TO CHRONIC BLOOD LOSS: ICD-10-CM

## 2025-03-06 DIAGNOSIS — R31.21 ASYMPTOMATIC MICROSCOPIC HEMATURIA: ICD-10-CM

## 2025-03-06 PROCEDURE — 99215 OFFICE O/P EST HI 40 MIN: CPT | Performed by: INTERNAL MEDICINE

## 2025-03-06 NOTE — ASSESSMENT & PLAN NOTE
Recurrent.  Did have some evidence of microscopic hematuria on last urinalysis in December.  Will repeat.  Also recommend to do a follow-up ultrasound renal for follow-up of her renal cyst.  Has had colonoscopy and EGD in December 2021.  Does have hiatal hernia reflux esophagitis.  Also had a video capsule endoscopy which showed a small ulcer.  Recommend following up with GI again for consideration of repeat scope?.  Did receive IV iron.  Iron indices have improved though remain in the lower range.  Hemoglobin has significantly improved to 12 g now.  From 8 g/dL.  Trial of oral iron intermittently.  If remains stable can monitor off of IV iron.  If drops again will need IV iron.  Will also look for any other concurrent deficiencies indicating malabsorption in general.  Orders:    CBC and differential; Future    LD,Blood; Future    Iron Panel (Includes Ferritin, Iron Sat%, Iron, and TIBC); Future    Urinalysis with microscopic; Future    Vitamin B12/Folate, Serum Panel; Future    iFOBT/FIT; Future

## 2025-03-06 NOTE — ASSESSMENT & PLAN NOTE
CLL/MBL/SLL with axillary lymph node and bone marrow involvement initially diagnosed January 2022.  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  Remains asymptomatic..  Will continue to monitor.  Orders:    CBC and differential; Future    LD,Blood; Future    Iron Panel (Includes Ferritin, Iron Sat%, Iron, and TIBC); Future    Urinalysis with microscopic; Future    Vitamin B12/Folate, Serum Panel; Future

## 2025-03-24 ENCOUNTER — HOSPITAL ENCOUNTER (OUTPATIENT)
Dept: ULTRASOUND IMAGING | Facility: HOSPITAL | Age: 65
Discharge: HOME | End: 2025-03-24
Payer: MEDICARE

## 2025-03-24 DIAGNOSIS — R31.9 HEMATURIA, UNSPECIFIED TYPE: ICD-10-CM

## 2025-03-24 PROCEDURE — 76775 US EXAM ABDO BACK WALL LIM: CPT

## 2025-03-26 ENCOUNTER — TELEPHONE (OUTPATIENT)
Dept: FAMILY MEDICINE CLINIC | Facility: CLINIC | Age: 65
End: 2025-03-26

## 2025-03-30 ENCOUNTER — RESULTS FOLLOW-UP (OUTPATIENT)
Dept: FAMILY MEDICINE CLINIC | Facility: CLINIC | Age: 65
End: 2025-03-30

## 2025-04-16 DIAGNOSIS — K21.00 GASTROESOPHAGEAL REFLUX DISEASE WITH ESOPHAGITIS WITHOUT HEMORRHAGE: ICD-10-CM

## 2025-04-16 RX ORDER — OMEPRAZOLE 40 MG/1
40 CAPSULE, DELAYED RELEASE ORAL DAILY
Qty: 90 CAPSULE | Refills: 1 | Status: SHIPPED | OUTPATIENT
Start: 2025-04-16

## 2025-05-30 ENCOUNTER — APPOINTMENT (OUTPATIENT)
Dept: LAB | Facility: CLINIC | Age: 65
End: 2025-05-30
Payer: MEDICARE

## 2025-05-30 DIAGNOSIS — C85.90 STAGE 4 LOW GRADE LYMPHOMA (HCC): ICD-10-CM

## 2025-05-30 DIAGNOSIS — R31.21 ASYMPTOMATIC MICROSCOPIC HEMATURIA: ICD-10-CM

## 2025-05-30 DIAGNOSIS — D50.0 IRON DEFICIENCY ANEMIA DUE TO CHRONIC BLOOD LOSS: ICD-10-CM

## 2025-05-30 LAB
BACTERIA UR QL AUTO: ABNORMAL /HPF
BASOPHILS # BLD AUTO: 0.08 THOUSANDS/ÂΜL (ref 0–0.1)
BASOPHILS NFR BLD AUTO: 1 % (ref 0–1)
BILIRUB UR QL STRIP: NEGATIVE
CLARITY UR: CLEAR
COLOR UR: YELLOW
EOSINOPHIL # BLD AUTO: 0.04 THOUSAND/ÂΜL (ref 0–0.61)
EOSINOPHIL NFR BLD AUTO: 0 % (ref 0–6)
ERYTHROCYTE [DISTWIDTH] IN BLOOD BY AUTOMATED COUNT: 14.9 % (ref 11.6–15.1)
FERRITIN SERPL-MCNC: 11 NG/ML (ref 30–307)
FOLATE SERPL-MCNC: 10.5 NG/ML
GLUCOSE UR STRIP-MCNC: NEGATIVE MG/DL
HCT VFR BLD AUTO: 39.7 % (ref 34.8–46.1)
HGB BLD-MCNC: 12.2 G/DL (ref 11.5–15.4)
HGB UR QL STRIP.AUTO: NEGATIVE
IMM GRANULOCYTES # BLD AUTO: 0.04 THOUSAND/UL (ref 0–0.2)
IMM GRANULOCYTES NFR BLD AUTO: 0 % (ref 0–2)
IRON SATN MFR SERPL: 10 % (ref 15–50)
IRON SERPL-MCNC: 49 UG/DL (ref 50–212)
KETONES UR STRIP-MCNC: NEGATIVE MG/DL
LDH SERPL-CCNC: 162 U/L (ref 140–271)
LEUKOCYTE ESTERASE UR QL STRIP: ABNORMAL
LYMPHOCYTES # BLD AUTO: 5.56 THOUSANDS/ÂΜL (ref 0.6–4.47)
LYMPHOCYTES NFR BLD AUTO: 42 % (ref 14–44)
MCH RBC QN AUTO: 25.7 PG (ref 26.8–34.3)
MCHC RBC AUTO-ENTMCNC: 30.7 G/DL (ref 31.4–37.4)
MCV RBC AUTO: 84 FL (ref 82–98)
MONOCYTES # BLD AUTO: 0.93 THOUSAND/ÂΜL (ref 0.17–1.22)
MONOCYTES NFR BLD AUTO: 7 % (ref 4–12)
MUCOUS THREADS UR QL AUTO: ABNORMAL
NEUTROPHILS # BLD AUTO: 6.52 THOUSANDS/ÂΜL (ref 1.85–7.62)
NEUTS SEG NFR BLD AUTO: 50 % (ref 43–75)
NITRITE UR QL STRIP: NEGATIVE
NON-SQ EPI CELLS URNS QL MICRO: ABNORMAL /HPF
NRBC BLD AUTO-RTO: 0 /100 WBCS
PH UR STRIP.AUTO: 6 [PH]
PLATELET # BLD AUTO: 241 THOUSANDS/UL (ref 149–390)
PMV BLD AUTO: 13.6 FL (ref 8.9–12.7)
PROT UR STRIP-MCNC: ABNORMAL MG/DL
RBC # BLD AUTO: 4.74 MILLION/UL (ref 3.81–5.12)
RBC #/AREA URNS AUTO: ABNORMAL /HPF
SP GR UR STRIP.AUTO: 1.02 (ref 1–1.03)
TIBC SERPL-MCNC: 478.8 UG/DL (ref 250–450)
TRANS CELLS #/AREA URNS HPF: PRESENT /[HPF]
TRANSFERRIN SERPL-MCNC: 342 MG/DL (ref 203–362)
UIBC SERPL-MCNC: 430 UG/DL (ref 155–355)
UROBILINOGEN UR STRIP-ACNC: <2 MG/DL
VIT B12 SERPL-MCNC: 382 PG/ML (ref 180–914)
WBC # BLD AUTO: 13.17 THOUSAND/UL (ref 4.31–10.16)
WBC #/AREA URNS AUTO: ABNORMAL /HPF

## 2025-05-30 PROCEDURE — 36415 COLL VENOUS BLD VENIPUNCTURE: CPT

## 2025-05-30 PROCEDURE — 82746 ASSAY OF FOLIC ACID SERUM: CPT

## 2025-05-30 PROCEDURE — 82607 VITAMIN B-12: CPT

## 2025-05-30 PROCEDURE — 82728 ASSAY OF FERRITIN: CPT

## 2025-05-30 PROCEDURE — 83540 ASSAY OF IRON: CPT

## 2025-05-30 PROCEDURE — 83550 IRON BINDING TEST: CPT

## 2025-05-30 PROCEDURE — 81001 URINALYSIS AUTO W/SCOPE: CPT

## 2025-05-30 PROCEDURE — 85025 COMPLETE CBC W/AUTO DIFF WBC: CPT

## 2025-05-30 PROCEDURE — 83615 LACTATE (LD) (LDH) ENZYME: CPT

## 2025-06-02 ENCOUNTER — RESULTS FOLLOW-UP (OUTPATIENT)
Dept: HEMATOLOGY ONCOLOGY | Facility: CLINIC | Age: 65
End: 2025-06-02

## 2025-06-09 ENCOUNTER — OFFICE VISIT (OUTPATIENT)
Dept: HEMATOLOGY ONCOLOGY | Facility: CLINIC | Age: 65
End: 2025-06-09
Payer: MEDICARE

## 2025-06-09 VITALS
WEIGHT: 190.5 LBS | DIASTOLIC BLOOD PRESSURE: 84 MMHG | BODY MASS INDEX: 33.75 KG/M2 | SYSTOLIC BLOOD PRESSURE: 118 MMHG | TEMPERATURE: 97.9 F | HEIGHT: 63 IN | RESPIRATION RATE: 17 BRPM

## 2025-06-09 DIAGNOSIS — C85.90 STAGE 4 LOW GRADE LYMPHOMA (HCC): ICD-10-CM

## 2025-06-09 DIAGNOSIS — D50.0 IRON DEFICIENCY ANEMIA DUE TO CHRONIC BLOOD LOSS: Primary | ICD-10-CM

## 2025-06-09 PROCEDURE — 99214 OFFICE O/P EST MOD 30 MIN: CPT | Performed by: INTERNAL MEDICINE

## 2025-06-09 RX ORDER — CHOLECALCIFEROL (VITAMIN D3) 10(400)/ML
1 DROPS ORAL DAILY
Qty: 30 TABLET | Refills: 0 | Status: SHIPPED | OUTPATIENT
Start: 2025-06-09

## 2025-06-09 RX ORDER — MAGNESIUM 200 MG
1 TABLET ORAL DAILY
Qty: 90 TABLET | Refills: 1 | Status: SHIPPED | OUTPATIENT
Start: 2025-06-09

## 2025-06-09 NOTE — PROGRESS NOTES
Name: Beverley Paul      : 1960      MRN: [id number]  Encounter Provider: Danielle Evans  Encounter Date: 2025   Encounter department: [facility] [place]  :  Assessment & Plan  Iron deficiency anemia due to chronic blood loss  Recurrent.  Patient has had EGD/colonoscopy/video endoscopy in 2021.  Did have hiatal hernia and reflux esophagitis and a small ulcer on video capsule endoscopy.  She denies any bleeding per rectum or dark stools per rectum.  Overall she is feeling okay.  She is not anemic however iron stores are again low.  But H&H has remained stable and well at 12.2.  At this point we will give her a trial of oral iron.  If she responds to oral iron and she can tolerate it well then she can take it intermittently.  She has not been able to do stool for occult blood.  Urinalysis did not show any further hematuria.  As a B12 is also on the lower end of normal spectrum may have some malabsorption issues.  Will give oral B12 as well and repeat indicis in 3 months.  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  Remains asymptomatic..  Will continue to monitor.  LDH normal       Patient verbalized understanding of the above discussion and all instructions.    Return in about 3 months (around 2025).    History of Present Illness   Chief Complaint   Patient presents with    Follow-up   Patient is 64-year-old female with history of  low-grade B-cell lymphoma-CLL's/SLL biopsy-proven of bone marrow and axillary lymph node initially diagnosed in January 2022.  Trisomy 12 IGHV mutated.  On surveillance.  Also noted to have significant iron deficiency anemia at her last visit in December 2024 and received IV iron therapy with Venofer last being on 1/20/2025.    EGD 1/10/2022:5 cm hiatal hernia   Mild erosive gastritis   Fundal gland polyps     Colonoscopy 1/10/2022:Left-sided diverticulosis   No polyps     Patient also had a VCE and was found to have a small ulcer.  This workup was done because of her iron deficiency anemia.  This then improved.  Till recently when she was found to have severe iron deficiency anemia and was given IV Venofer to which she has responded overall well.  She denies any obvious bleeding per rectum melena any noticeable hematuria.  She does have history of a renal cyst.      Oncology History   Cancer Staging   No matching staging information was found for the patient.  Oncology History   Stage 4 low grade lymphoma (HCC)   1/12/2022 Initial Diagnosis    October 2021 patient had a white count showing 13.7, hemoglobin 8.5, MCV 70, platelet count 257, normal differential.  Absolute lymphocytes 5.7.  Iron saturation 5%.  November 2021 flow cytometry showed MBL, nonspecific phenotype, clonal B cells 2.1.  January 12, 2022 bone marrow biopsy showed low-grade B-cell lymphoma, nonspecific phenotype, 30-35% of 65-70% cellular marrow.  NexGen sequencing showed no mutations.  Karyotype 47 XX trisomy 12.  Otherwise normal maturation.        Pertinent Medical History     06/09/25: Patient comes for follow-up.  She is not on any oral iron.  However she reports   Review of Systems   Constitutional:  Negative for chills and fever.   HENT:  Negative for ear pain and sore throat.    Eyes:  Negative for pain and visual disturbance.   Respiratory:  Negative for cough and shortness of breath.    Cardiovascular:  Negative for chest pain and  "palpitations.   Gastrointestinal:  Negative for abdominal pain and vomiting.   Genitourinary:  Negative for dysuria and hematuria.   Musculoskeletal:  Negative for arthralgias and back pain.   Skin:  Negative for color change and rash.   Neurological:  Negative for seizures and syncope.   All other systems reviewed and are negative.    Medical History Reviewed by provider this encounter:     .      Objective   /84 (BP Location: Left arm, Patient Position: Sitting, Cuff Size: Large)   Temp 97.9 °F (36.6 °C) (Temporal)   Resp 17   Ht 5' 3\" (1.6 m)   Wt 86.4 kg (190 lb 8 oz)   BMI 33.75 kg/m²     Pain Screening:  Pain Score: 0-No pain  ECOG   0  Physical Exam  Constitutional:       Appearance: Normal appearance. She is normal weight.   HENT:      Head: Normocephalic.      Nose: Nose normal.      Mouth/Throat:      Mouth: Mucous membranes are moist.     Eyes:      Extraocular Movements: Extraocular movements intact.      Pupils: Pupils are equal, round, and reactive to light.       Cardiovascular:      Rate and Rhythm: Normal rate.      Pulses: Normal pulses.   Pulmonary:      Effort: Pulmonary effort is normal.      Breath sounds: Normal breath sounds.   Abdominal:      General: Bowel sounds are normal.      Palpations: Abdomen is soft.      Tenderness: There is no abdominal tenderness.     Musculoskeletal:         General: Normal range of motion.      Cervical back: Normal range of motion.   Lymphadenopathy:      Cervical: No cervical adenopathy.     Skin:     General: Skin is warm.     Neurological:      General: No focal deficit present.      Mental Status: She is alert and oriented to person, place, and time. Mental status is at baseline.         Labs: I have reviewed the following labs:  Lab Results   Component Value Date/Time    WBC 13.17 (H) 05/30/2025 10:35 AM    RBC 4.74 05/30/2025 10:35 AM    Hemoglobin 12.2 05/30/2025 10:35 AM    Hematocrit 39.7 05/30/2025 10:35 AM    MCV 84 05/30/2025 10:35 AM    " MCH 25.7 (L) 05/30/2025 10:35 AM    RDW 14.9 05/30/2025 10:35 AM    Platelets 241 05/30/2025 10:35 AM    Segmented % 50 05/30/2025 10:35 AM    Lymphocytes % 42 05/30/2025 10:35 AM    Monocytes % 7 05/30/2025 10:35 AM    Eosinophils Relative 0 05/30/2025 10:35 AM    Basophils Relative 1 05/30/2025 10:35 AM    Immature Grans % 0 05/30/2025 10:35 AM    Absolute Neutrophils 6.52 05/30/2025 10:35 AM     Lab Results   Component Value Date/Time    Potassium 4.3 03/04/2025 09:02 AM    Chloride 105 03/04/2025 09:02 AM    CO2 29 03/04/2025 09:02 AM    BUN 17 03/04/2025 09:02 AM    Creatinine 0.80 03/04/2025 09:02 AM    Glucose, Fasting 95 03/04/2025 09:02 AM    Calcium 9.9 03/04/2025 09:02 AM    AST 14 03/04/2025 09:02 AM    ALT 11 03/04/2025 09:02 AM    Alkaline Phosphatase 94 03/04/2025 09:02 AM    Total Protein 7.0 03/04/2025 09:02 AM    Albumin 4.3 03/04/2025 09:02 AM    Total Bilirubin 0.94 03/04/2025 09:02 AM    eGFR 79 03/04/2025 09:02 AM     Lab Results   Component Value Date/Time    Iron 49 (L) 05/30/2025 10:35 AM    Iron Saturation 10 (L) 05/30/2025 10:35 AM    Ferritin 11 (L) 05/30/2025 10:35 AM    Vitamin B-12 382 05/30/2025 10:35 AM    Folate 10.5 05/30/2025 10:35 AM        Radiology Results Review: I have reviewed radiology reports from multiple dates including: Mammogram DEXA, CT abdomen/pelvis, procedure reports, and Ultrasound(s).    Administrative Statements   I have spent a total time of 30 minutes in caring for this patient on the day of the visit/encounter including Diagnostic results, Prognosis, Risks and benefits of tx options, Instructions for management, Risk factor reductions, Impressions, Counseling / Coordination of care, Documenting in the medical record, Reviewing/placing orders in the medical record (including tests, medications, and/or procedures), Obtaining or reviewing history  , and Communicating with other healthcare professionals .

## 2025-06-09 NOTE — ASSESSMENT & PLAN NOTE
Recurrent.  Patient has had EGD/colonoscopy/video endoscopy in December 2021.  Did have hiatal hernia and reflux esophagitis and a small ulcer on video capsule endoscopy.  She denies any bleeding per rectum or dark stools per rectum.  Overall she is feeling okay.  She is not anemic however iron stores are again low.  But H&H has remained stable and well at 12.2.  At this point we will give her a trial of oral iron.  If she responds to oral iron and she can tolerate it well then she can take it intermittently.  She has not been able to do stool for occult blood.  Urinalysis did not show any further hematuria.  As a B12 is also on the lower end of normal spectrum may have some malabsorption issues.  Will give oral B12 as well and repeat indicis in 3 months.  Orders:    Ferrous Sulfate Dried ER (Slow Iron) 160 (50 Fe) MG TBCR; Take 1 tablet by mouth in the morning    Cyanocobalamin (B-12) 1000 MCG SUBL; Place 1 tablet (1,000 mcg total) under the tongue in the morning    CBC and differential; Future    Comprehensive metabolic panel; Future    Iron Panel (Includes Ferritin, Iron Sat%, Iron, and TIBC); Future    Vitamin B12/Folate, Serum Panel; Future

## 2025-06-09 NOTE — ASSESSMENT & PLAN NOTE
CLL/MBL/SLL with axillary lymph node and bone marrow involvement initially diagnosed January 2022.  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  Remains asymptomatic..  Will continue to monitor.  LDH normal

## 2025-06-19 ENCOUNTER — OFFICE VISIT (OUTPATIENT)
Dept: URGENT CARE | Facility: CLINIC | Age: 65
End: 2025-06-19
Payer: MEDICARE

## 2025-06-19 VITALS
BODY MASS INDEX: 33.84 KG/M2 | HEIGHT: 63 IN | WEIGHT: 191 LBS | HEART RATE: 78 BPM | OXYGEN SATURATION: 100 % | DIASTOLIC BLOOD PRESSURE: 74 MMHG | RESPIRATION RATE: 18 BRPM | TEMPERATURE: 98.5 F | SYSTOLIC BLOOD PRESSURE: 144 MMHG

## 2025-06-19 DIAGNOSIS — H69.92 DISORDER OF LEFT EUSTACHIAN TUBE: Primary | ICD-10-CM

## 2025-06-19 DIAGNOSIS — H92.02 OTALGIA OF LEFT EAR: ICD-10-CM

## 2025-06-19 PROCEDURE — G0463 HOSPITAL OUTPT CLINIC VISIT: HCPCS | Performed by: PHYSICIAN ASSISTANT

## 2025-06-19 PROCEDURE — 99213 OFFICE O/P EST LOW 20 MIN: CPT | Performed by: PHYSICIAN ASSISTANT

## 2025-06-19 RX ORDER — PREDNISONE 10 MG/1
TABLET ORAL
Qty: 20 TABLET | Refills: 0 | Status: SHIPPED | OUTPATIENT
Start: 2025-06-19

## 2025-06-19 NOTE — PATIENT INSTRUCTIONS
"Patient Education      Prednisone as directed  E-Z cold lozenge every 6 hrs  Follow up with PCP in 3-5 days.  Proceed to  ER if symptoms worsen.       Eustachian tube problems   The Basics   Written by the doctors and editors at [facility]   What is the Eustachian tube? -- This tube connects the middle ear (the part of the ear behind the eardrum) to the back of the nose and throat (figure 1).  Normally, the Eustachian tube opens and closes. This helps keep the air pressure inside the middle ear the same as the air pressure outside the middle ear. If there is a problem with the tube opening, the air pressure inside the middle ear won't be the same as the air pressure outside it. This can cause ear pain, hearing loss, and other symptoms. \"Ear barotrauma\" is the medical term for when people have symptoms or damage in the middle ear because of air pressure differences.  In some people, the Eustachian tube does not close normally, but stays open all of the time. This can also cause symptoms like hearing the sound of your own voice and breathing.  Most Eustachian tube problems last only a short time and get better on their own. But they can sometimes lead to more serious conditions, such as:   A middle ear infection   A torn eardrum   Hearing loss  In children, long-term hearing loss from Eustachian tube problems can also lead to language or speech problems.  What causes Eustachian tube problems? -- Common causes are:   Illnesses or conditions that make the Eustachian tubes swollen or inflamed - These include colds, allergies, ear infections, or sinus infections. The sinuses are hollow areas in the bones of the face.   Sudden air pressure changes - These can happen when people fly in an airplane, scuba dive, or drive up to the mountains.   Growths that block the Eustachian tube   Being born with an abnormal Eustachian tube  What are the symptoms of a Eustachian tube problem? -- Common symptoms include:   Ear pain   Feeling " "pressure or fullness in the ear   Trouble hearing   Ringing in the ear   Feeling dizzy   Loud sounds of your own voice or your own breathing  Should I see a doctor or nurse? -- See your doctor or nurse if your symptoms are severe, get worse, or don't go away after a few days.  Will I need tests? -- Probably not. Your doctor or nurse should be able to tell if you have a Eustachian tube problem by learning about your symptoms and doing an exam.  If your symptoms are severe, last for a long time, or only affect 1 ear, your doctor or nurse might:   Have you see a special kind of doctor called an ear, nose, and throat (\"ENT\") doctor   Do tests to check your hearing   Do an imaging test - These create pictures of the inside of the body.  How are Eustachian tube problems treated? -- Treatment depends on what's causing the problem. Depending on your individual situation, your doctor might treat you with 1 or more of the following:   Nose sprays   Antihistamines - These medicines are usually used to treat allergies. They help stop itching, sneezing, and runny nose symptoms.   Decongestants - These medicines can help with stuffy nose symptoms.   Instructions to avoid dehydration - Drink a lot of fluids, especially before doing physical activity or being in the heat.   Surgery - Most people do not need surgery for Eustachian tube problems. But people might need surgery if their symptoms don't get better with medicines or they have severe or long-term symptoms.  Antibiotics are not needed to treat Eustachian tube problems. But they might be needed if a person has an ear infection.  All topics are updated as new evidence becomes available and our peer review process is complete.  This topic retrieved from [facility] on: May 19, 2024.  Topic 37497 Version 17.0  Release: 32.4.3 - C32.138  © 2024 UpToDate, Inc. and/or its affiliates. All rights reserved.  figure 1: Eustachian tube     The Eustachian tube is a tube that connects the " middle ear (the part of the ear behind the eardrum) to the back of the nose and throat.  Graphic 46035 Version 2.0  Consumer Information Use and Disclaimer   Disclaimer: This generalized information is a limited summary of diagnosis, treatment, and/or medication information. It is not meant to be comprehensive and should be used as a tool to help the user understand and/or assess potential diagnostic and treatment options. It does NOT include all information about conditions, treatments, medications, side effects, or risks that may apply to a specific patient. It is not intended to be medical advice or a substitute for the medical advice, diagnosis, or treatment of a health care provider based on the health care provider's examination and assessment of a patient's specific and unique circumstances. Patients must speak with a health care provider for complete information about their health, medical questions, and treatment options, including any risks or benefits regarding use of medications. This information does not endorse any treatments or medications as safe, effective, or approved for treating a specific patient. UpToDate, Inc. and its affiliates disclaim any warranty or liability relating to this information or the use thereof.The use of this information is governed by the Terms of Use, available at https://www.woltersiValidate.meuwer.com/en/know/clinical-effectiveness-terms. 2024© UpToDate, Inc. and its affiliates and/or licensors. All rights reserved.  Copyright   © 2024 UpToDate, Inc. and/or its affiliates. All rights reserved.

## 2025-06-19 NOTE — PROGRESS NOTES
[facility]        NAME: Beverley Paul is a 64 y.o. female  : 1960    MRN: [id number]  DATE: 2025  TIME: 1:45 PM    Assessment and Plan   Disorder of left eustachian tube [H69.92]  1. Disorder of left eustachian tube  predniSONE 10 mg tablet      2. Otalgia of left ear  predniSONE 10 mg tablet            Patient Instructions     Prednisone as directed  E-Z cold lozenge every 6 hrs    Follow up with PCP in 3-5 days.  Proceed to  ER if symptoms worsen.    Chief Complaint     Chief Complaint   Patient presents with    Earache     Pain started last night only left side and has tubes in the left.          History of Present Illness       Earache   There is pain in the left ear. This is a new problem. The current episode started yesterday. The problem occurs constantly. The problem has been unchanged. There has been no fever. The pain is at a severity of 8/10. Pertinent negatives include no abdominal pain, coughing, diarrhea, ear discharge, headaches, hearing loss, neck pain, rash, rhinorrhea, sore throat or vomiting. She has tried nothing for the symptoms. The treatment provided no relief. Her past medical history is significant for a tympanostomy tube.       Review of Systems   Review of Systems   Constitutional:  Negative for activity change, appetite change, chills and fever.   HENT:  Positive for ear pain. Negative for congestion, ear discharge, hearing loss, rhinorrhea and sore throat.    Respiratory:  Negative for cough.    Gastrointestinal:  Negative for abdominal pain, diarrhea and vomiting.   Musculoskeletal:  Negative for neck pain.   Skin:  Negative for rash.   Neurological:  Negative for headaches.         Current Medications     Current Medications[1]    Current Allergies     Allergies as of 2025 - Reviewed 2025   Allergen Reaction Noted    Benadryl [diphenhydramine] Anaphylaxis 10/08/2021    Erythromycin Hives 10/08/2021            The following portions of the  "patient's history were reviewed and updated as appropriate: allergies, current medications, past family history, past medical history, past social history, past surgical history and problem list.     Past Medical History[2]    Past Surgical History[3]    Family History[4]      Medications have been verified.        Objective   /74   Pulse 78   Temp 98.5 °F (36.9 °C)   Resp 18   Ht 5' 3\" (1.6 m)   Wt 86.6 kg (191 lb)   SpO2 100%   BMI 33.83 kg/m²        Physical Exam     Physical Exam  Vitals and nursing note reviewed.   Constitutional:       Appearance: Normal appearance. She is normal weight.   HENT:      Right Ear: Tympanic membrane, ear canal and external ear normal.      Left Ear: Tympanic membrane, ear canal and external ear normal. There is impacted cerumen (minimally).      Ears:        Comments: Left tympanic membrane with minimal cerumen.  TMs intact, bilateral.     Nose: Nose normal.      Mouth/Throat:      Mouth: Mucous membranes are moist.     Eyes:      General: No scleral icterus.     Extraocular Movements: Extraocular movements intact.      Conjunctiva/sclera: Conjunctivae normal.      Pupils: Pupils are equal, round, and reactive to light.       Cardiovascular:      Rate and Rhythm: Normal rate and regular rhythm.      Pulses: Normal pulses.   Pulmonary:      Effort: Pulmonary effort is normal. No respiratory distress.     Musculoskeletal:         General: Normal range of motion.      Cervical back: Normal range of motion and neck supple.     Skin:     General: Skin is warm.      Capillary Refill: Capillary refill takes less than 2 seconds.     Neurological:      General: No focal deficit present.      Mental Status: She is alert and oriented to person, place, and time.      Coordination: Coordination normal.      Gait: Gait normal.     Psychiatric:         Mood and Affect: Mood normal.         Behavior: Behavior normal.         Thought Content: Thought content normal.         Judgment: " Judgment normal.                        [1]   Current Outpatient Medications:     Ascorbic Acid (vitamin C) 1000 MG tablet, Take 1 tablet by mouth in the morning., Disp: , Rfl:     Cyanocobalamin (B-12) 1000 MCG SUBL, Place 1 tablet (1,000 mcg total) under the tongue in the morning, Disp: 90 tablet, Rfl: 1    Diclofenac Sodium (VOLTAREN) 1 %, Apply 2 g topically 4 (four) times a day, Disp: 100 g, Rfl: 3    Ferrous Sulfate Dried ER (Slow Iron) 160 (50 Fe) MG TBCR, Take 1 tablet by mouth in the morning, Disp: 30 tablet, Rfl: 0    Multiple Vitamin (multivitamin stress formula), Take 1 tablet by mouth in the morning., Disp: , Rfl:     Omega-3 Fatty Acids (fish oil) 1,000 mg, Take 1,000 mg by mouth in the morning., Disp: , Rfl:     omeprazole (PriLOSEC) 40 MG capsule, TAKE 1 CAPSULE (40 MG TOTAL) BY MOUTH DAILY., Disp: 90 capsule, Rfl: 1    predniSONE 10 mg tablet, Take 4 pills PO once in the morning for first 2 days.  3 Pills PO for next 2 days, 2 pills PO for next 2 days, 1 pill PO for next 2 days., Disp: 20 tablet, Rfl: 0    VITAMIN D PO, Take by mouth, Disp: , Rfl:   [2]   Past Medical History:  Diagnosis Date    Anxiety     Depression     Lymphoma (HCC) 02/2022    Right trigger finger    [3]   Past Surgical History:  Procedure Laterality Date    ANKLE HARDWARE REMOVAL      CHOLECYSTECTOMY      HYSTERECTOMY      IR BIOPSY BONE MARROW  1/12/2022    US GUIDED BREAST BIOPSY RIGHT COMPLETE Right 4/5/2022    US GUIDED BREAST LYMPH NODE BIOPSY LEFT Left 9/8/2023   [4]   Family History  Problem Relation Name Age of Onset    Hypertension Mother      Breast cancer Maternal Grandmother Kristina gallagher 60        unknown age    Diabetes Brother      Colon cancer Neg Hx      Ovarian cancer Neg Hx      Uterine cancer Neg Hx      Cervical cancer Neg Hx

## 2025-07-03 ENCOUNTER — OFFICE VISIT (OUTPATIENT)
Dept: FAMILY MEDICINE CLINIC | Facility: CLINIC | Age: 65
End: 2025-07-03
Payer: MEDICARE

## 2025-07-03 VITALS
BODY MASS INDEX: 34.2 KG/M2 | HEIGHT: 63 IN | OXYGEN SATURATION: 96 % | HEART RATE: 80 BPM | TEMPERATURE: 96.8 F | DIASTOLIC BLOOD PRESSURE: 78 MMHG | WEIGHT: 193 LBS | SYSTOLIC BLOOD PRESSURE: 132 MMHG

## 2025-07-03 DIAGNOSIS — E78.5 DYSLIPIDEMIA: ICD-10-CM

## 2025-07-03 DIAGNOSIS — Z00.00 MEDICARE ANNUAL WELLNESS VISIT, SUBSEQUENT: Primary | ICD-10-CM

## 2025-07-03 DIAGNOSIS — Z12.31 VISIT FOR SCREENING MAMMOGRAM: ICD-10-CM

## 2025-07-03 DIAGNOSIS — H92.02 OTALGIA, LEFT EAR: ICD-10-CM

## 2025-07-03 PROBLEM — E66.01 OBESITY, MORBID (HCC): Status: RESOLVED | Noted: 2024-03-13 | Resolved: 2025-07-03

## 2025-07-03 PROCEDURE — G0439 PPPS, SUBSEQ VISIT: HCPCS

## 2025-07-03 PROCEDURE — 99213 OFFICE O/P EST LOW 20 MIN: CPT

## 2025-07-03 PROCEDURE — G2211 COMPLEX E/M VISIT ADD ON: HCPCS

## 2025-07-03 RX ORDER — METHYLPREDNISOLONE 4 MG/1
TABLET ORAL
Qty: 21 EACH | Refills: 0 | Status: SHIPPED | OUTPATIENT
Start: 2025-07-03

## 2025-07-03 NOTE — PATIENT INSTRUCTIONS
Medicare Preventive Visit Patient Instructions  Thank you for completing your Welcome to Medicare Visit or Medicare Annual Wellness Visit today. Your next wellness visit will be due in one year (7/4/2026).  The screening/preventive services that you may require over the next 5-10 years are detailed below. Some tests may not apply to you based off risk factors and/or age. Screening tests ordered at today's visit but not completed yet may show as past due. Also, please note that scanned in results may not display below.  Preventive Screenings:  Service Recommendations Previous Testing/Comments   Colorectal Cancer Screening  * Colonoscopy    * Fecal Occult Blood Test (FOBT)/Fecal Immunochemical Test (FIT)  * Fecal DNA/Cologuard Test  * Flexible Sigmoidoscopy Age: 45-75 years old   Colonoscopy: every 10 years (may be performed more frequently if at higher risk)  OR  FOBT/FIT: every 1 year  OR  Cologuard: every 3 years  OR  Sigmoidoscopy: every 5 years  Screening may be recommended earlier than age 45 if at higher risk for colorectal cancer. Also, an individualized decision between you and your healthcare provider will decide whether screening between the ages of 76-85 would be appropriate. Colonoscopy: 01/10/2022  FOBT/FIT: Not on file  Cologuard: Not on file  Sigmoidoscopy: Not on file    Screening Current     Breast Cancer Screening Age: 40+ years old  Frequency: every 1-2 years  Not required if history of left and right mastectomy Mammogram: 06/28/2024    Screening Current   Cervical Cancer Screening Between the ages of 21-29, pap smear recommended once every 3 years.   Between the ages of 30-65, can perform pap smear with HPV co-testing every 5 years.   Recommendations may differ for women with a history of total hysterectomy, cervical cancer, or abnormal pap smears in past. Pap Smear: Not on file        Hepatitis C Screening Once for adults born between 1945 and 1965  More frequently in patients at high risk for  Hepatitis C Hep C Antibody: Not on file    Screening Current   Diabetes Screening 1-2 times per year if you're at risk for diabetes or have pre-diabetes Fasting glucose: 95 mg/dL (3/4/2025)  A1C: 5.3 % (2/7/2022)  Screening Current   Cholesterol Screening Once every 5 years if you don't have a lipid disorder. May order more often based on risk factors. Lipid panel: 03/13/2024    History Lipid Disorder  Screening Current     Other Preventive Screenings Covered by Medicare:  Abdominal Aortic Aneurysm (AAA) Screening: covered once if your at risk. You're considered to be at risk if you have a family history of AAA.  Lung Cancer Screening: covers low dose CT scan once per year if you meet all of the following conditions: (1) Age 55-77; (2) No signs or symptoms of lung cancer; (3) Current smoker or have quit smoking within the last 15 years; (4) You have a tobacco smoking history of at least 20 pack years (packs per day multiplied by number of years you smoked); (5) You get a written order from a healthcare provider.  Glaucoma Screening: covered annually if you're considered high risk: (1) You have diabetes OR (2) Family history of glaucoma OR (3)  aged 50 and older OR (4)  American aged 65 and older  Osteoporosis Screening: covered every 2 years if you meet one of the following conditions: (1) You're estrogen deficient and at risk for osteoporosis based off medical history and other findings; (2) Have a vertebral abnormality; (3) On glucocorticoid therapy for more than 3 months; (4) Have primary hyperparathyroidism; (5) On osteoporosis medications and need to assess response to drug therapy.   Last bone density test (DXA Scan): 07/22/2024.  HIV Screening: covered annually if you're between the age of 15-65. Also covered annually if you are younger than 15 and older than 65 with risk factors for HIV infection. For pregnant patients, it is covered up to 3 times per  pregnancy.    Immunizations:  Immunization Recommendations   Influenza Vaccine Annual influenza vaccination during flu season is recommended for all persons aged >= 6 months who do not have contraindications   Pneumococcal Vaccine   * Pneumococcal conjugate vaccine = PCV13 (Prevnar 13), PCV15 (Vaxneuvance), PCV20 (Prevnar 20)  * Pneumococcal polysaccharide vaccine = PPSV23 (Pneumovax) Adults 19-65 yo with certain risk factors or if 65+ yo  If never received any pneumonia vaccine: recommend Prevnar 20 (PCV20)  Give PCV20 if previously received 1 dose of PCV13 or PPSV23   Hepatitis B Vaccine 3 dose series if at intermediate or high risk (ex: diabetes, end stage renal disease, liver disease)   Respiratory syncytial virus (RSV) Vaccine - COVERED BY MEDICARE PART D  * RSVPreF3 (Arexvy) CDC recommends that adults 60 years of age and older may receive a single dose of RSV vaccine using shared clinical decision-making (SCDM)   Tetanus (Td) Vaccine - COST NOT COVERED BY MEDICARE PART B Following completion of primary series, a booster dose should be given every 10 years to maintain immunity against tetanus. Td may also be given as tetanus wound prophylaxis.   Tdap Vaccine - COST NOT COVERED BY MEDICARE PART B Recommended at least once for all adults. For pregnant patients, recommended with each pregnancy.   Shingles Vaccine (Shingrix) - COST NOT COVERED BY MEDICARE PART B  2 shot series recommended in those 19 years and older who have or will have weakened immune systems or those 50 years and older     Health Maintenance Due:      Topic Date Due   • HIV Screening  Never done   • Breast Cancer Screening: Mammogram  06/28/2025   • Colorectal Cancer Screening  01/08/2032   • Hepatitis C Screening  Completed     Immunizations Due:      Topic Date Due   • COVID-19 Vaccine (3 - Pfizer risk series) 04/08/2022   • Pneumococcal Vaccine: 50+ Years (2 of 2 - PCV) 11/05/2022   • Influenza Vaccine (1) 09/01/2025     Advance Directives    What are advance directives?  Advance directives are legal documents that state your wishes and plans for medical care. These plans are made ahead of time in case you lose your ability to make decisions for yourself. Advance directives can apply to any medical decision, such as the treatments you want, and if you want to donate organs.   What are the types of advance directives?  There are many types of advance directives, and each state has rules about how to use them. You may choose a combination of any of the following:  Living will:  This is a written record of the treatment you want. You can also choose which treatments you do not want, which to limit, and which to stop at a certain time. This includes surgery, medicine, IV fluid, and tube feedings.   Durable power of  for healthcare (DPAHC):  This is a written record that states who you want to make healthcare choices for you when you are unable to make them for yourself. This person, called a proxy, is usually a family member or a friend. You may choose more than 1 proxy.  Do not resuscitate (DNR) order:  A DNR order is used in case your heart stops beating or you stop breathing. It is a request not to have certain forms of treatment, such as CPR. A DNR order may be included in other types of advance directives.  Medical directive:  This covers the care that you want if you are in a coma, near death, or unable to make decisions for yourself. You can list the treatments you want for each condition. Treatment may include pain medicine, surgery, blood transfusions, dialysis, IV or tube feedings, and a ventilator (breathing machine).  Values history:  This document has questions about your views, beliefs, and how you feel and think about life. This information can help others choose the care that you would choose.  Why are advance directives important?  An advance directive helps you control your care. Although spoken wishes may be used, it is better to  have your wishes written down. Spoken wishes can be misunderstood, or not followed. Treatments may be given even if you do not want them. An advance directive may make it easier for your family to make difficult choices about your care.   Weight Management   Why it is important to manage your weight:  Being overweight increases your risk of health conditions such as heart disease, high blood pressure, type 2 diabetes, and certain types of cancer. It can also increase your risk for osteoarthritis, sleep apnea, and other respiratory problems. Aim for a slow, steady weight loss. Even a small amount of weight loss can lower your risk of health problems.  How to lose weight safely:  A safe and healthy way to lose weight is to eat fewer calories and get regular exercise. You can lose up about 1 pound a week by decreasing the number of calories you eat by 500 calories each day.   Healthy meal plan for weight management:  A healthy meal plan includes a variety of foods, contains fewer calories, and helps you stay healthy. A healthy meal plan includes the following:  Eat whole-grain foods more often.  A healthy meal plan should contain fiber. Fiber is the part of grains, fruits, and vegetables that is not broken down by your body. Whole-grain foods are healthy and provide extra fiber in your diet. Some examples of whole-grain foods are whole-wheat breads and pastas, oatmeal, brown rice, and bulgur.  Eat a variety of vegetables every day.  Include dark, leafy greens such as spinach, kale, radha greens, and mustard greens. Eat yellow and orange vegetables such as carrots, sweet potatoes, and winter squash.   Eat a variety of fruits every day.  Choose fresh or canned fruit (canned in its own juice or light syrup) instead of juice. Fruit juice has very little or no fiber.  Eat low-fat dairy foods.  Drink fat-free (skim) milk or 1% milk. Eat fat-free yogurt and low-fat cottage cheese. Try low-fat cheeses such as mozzarella and  other reduced-fat cheeses.  Choose meat and other protein foods that are low in fat.  Choose beans or other legumes such as split peas or lentils. Choose fish, skinless poultry (chicken or turkey), or lean cuts of red meat (beef or pork). Before you cook meat or poultry, cut off any visible fat.   Use less fat and oil.  Try baking foods instead of frying them. Add less fat, such as margarine, sour cream, regular salad dressing and mayonnaise to foods. Eat fewer high-fat foods. Some examples of high-fat foods include french fries, doughnuts, ice cream, and cakes.  Eat fewer sweets.  Limit foods and drinks that are high in sugar. This includes candy, cookies, regular soda, and sweetened drinks.  Exercise:  Exercise at least 30 minutes per day on most days of the week. Some examples of exercise include walking, biking, dancing, and swimming. You can also fit in more physical activity by taking the stairs instead of the elevator or parking farther away from stores. Ask your healthcare provider about the best exercise plan for you.    © Copyright [employer] 2018 Information is for End User's use only and may not be sold, redistributed or otherwise used for commercial purposes. All illustrations and images included in CareNotes® are the copyrighted property of [employer]., [employer] or [employer]

## 2025-07-03 NOTE — PROGRESS NOTES
"Name: Beverley Paul      : 1960      MRN: [id number]  Encounter Provider: Yordan Matthews PA-C  Encounter Date: 7/3/2025   Encounter department: [facility]  :  Assessment & Plan  Medicare annual wellness visit, subsequent         Otalgia, left ear  Chronic waxing and waning left ear discomfort, significant Hx of tube being placed  per pt for \"ear drainage\"  Pain located \"behind\" the left ear chronically, but exacerbated and then went to  given steroids  X 2 weeks of worsening  Described as ripping pain at worst, currently more of a \"clogging\" also endorses recent ear drainage as well  does not have a Hx of trauma or injury related to current sx.  Pertinent negatives at this time include no fevers .   Otoscopic examination of the left ear reveals moderate cerumen impaction as well as placement of ear tube which has occluded full visualization of the tympanic membrane, but the inner ear does appear erythematous and irritated  Based on incomplete response of prior steroid course as well as examination today, will cover for otitis media with Augmentin course advise probiotic or yogurt while taking  Will also reinitiate symptomatic relief with steroid low-dose methylprednisone taper considering recent longer steroid taper  Will also refer to ENT based on ear tube placement and chronicity of waxing waning pain for further evaluation if not improvement with today's plan  Orders:    amoxicillin-clavulanate (AUGMENTIN) 875-125 mg per tablet; Take 1 tablet by mouth every 12 (twelve) hours for 7 days Take with food.    methylPREDNISolone 4 MG tablet therapy pack; Use as directed on package    Ambulatory Referral to Otolaryngology; Future    Dyslipidemia    Orders:    Lipid panel; Future    Visit for screening mammogram    Orders:    Mammo screening bilateral w 3d and cad; Future       Preventive health issues were discussed with patient, and age appropriate screening tests were ordered " "as noted in patient's After Visit Summary. Personalized health advice and appropriate referrals for health education or preventive services given if needed, as noted in patient's After Visit Summary.    History of Present Illness     Beverley Paul is a 64 y.o. female  presenting for otalgia follow-up from urgent care and is also due for annual med well.  She states that she has had chronic ear discomfort, the steroid helped initially, but she continues with exacerbation of ear pain during today's appointment.    vaccines, care gaps, screenings, routine labs, relevant lab work and imaging, reviewed at this visit.  Advised shingles and Tdap vaccination at pharmacy.    **Note: Portions of the record may have been created with voice recognition software.  Occasional wrong word or \"sound alike\" substitutions may have occurred due to the inherent limitations of voice recognition software.  Please read the chart carefully and recognize, using context, where substitutions have occurred. Please contact for further clarification, when necessary.        Patient Care Team:  Delio Salvador MD as PCP - General (Family Medicine)  Delio Salvador MD as PCP - PCP-[facility] ([facility])  MD Francoise Huddleston PA-C (Gastroenterology)  Danielle Evans (Hematology and Oncology)    Review of Systems   Constitutional:  Negative for chills and fever.   HENT:  Positive for ear discharge and ear pain. Negative for sore throat.    Eyes:  Negative for pain and visual disturbance.   Respiratory:  Negative for cough and shortness of breath.    Cardiovascular:  Negative for chest pain and palpitations.   Gastrointestinal:  Negative for abdominal pain and vomiting.   Genitourinary:  Negative for dysuria and hematuria.   Musculoskeletal:  Negative for arthralgias and back pain.   Skin:  Negative for color change and rash.   Neurological:  Negative for seizures and syncope.   All other systems reviewed and are negative.    Medical History Reviewed by " provider this encounter:       Annual Wellness Visit Questionnaire       Health Risk Assessment:   Patient rates overall health as good. Patient feels that their physical health rating is slightly better. Patient is satisfied with their life. Eyesight was rated as same. Hearing was rated as same. Patient feels that their emotional and mental health rating is slightly better. Patients states they are never, rarely angry. Patient states they are never, rarely unusually tired/fatigued. Pain experienced in the last 7 days has been none. Patient states that she has experienced no weight loss or gain in last 6 months.     Depression Screening:   PHQ-9 Score: 2      Fall Risk Screening:   In the past year, patient has experienced: no history of falling in past year      Urinary Incontinence Screening:   Patient has not leaked urine accidently in the last six months.     Home Safety:  Patient does not have trouble with stairs inside or outside of their home. Patient has working smoke alarms and has working carbon monoxide detector. Home safety hazards include: none.     Nutrition:   Current diet is Limited junk food and Regular.     Medications:   Patient is currently taking over-the-counter supplements. OTC medications include: see medication list. Patient is able to manage medications.     Activities of Daily Living (ADLs)/Instrumental Activities of Daily Living (IADLs):   Walk and transfer into and out of bed and chair?: Yes  Dress and groom yourself?: Yes    Bathe or shower yourself?: Yes    Feed yourself? Yes  Do your laundry/housekeeping?: Yes  Manage your money, pay your bills and track your expenses?: Yes  Make your own meals?: Yes    Do your own shopping?: Yes    Previous Hospitalizations:   Any hospitalizations or ED visits within the last 12 months?: No      Preventive Screenings      Cardiovascular Screening:    General: History Lipid Disorder and Screening Current      Diabetes Screening:     General: Screening  "Current      Colorectal Cancer Screening:     General: Screening Current      Breast Cancer Screening:     General: Screening Current      Osteoporosis Screening:      Due for: DXA Axial      Abdominal Aortic Aneurysm (AAA) Screening:        General: Screening Not Indicated      Lung Cancer Screening:     General: Screening Not Indicated      Hepatitis C Screening:    General: Screening Current    Immunizations:  - Immunizations due: Prevnar 20 and Zoster (Shingrix)    Screening, Brief Intervention, and Referral to Treatment (SBIRT)     Screening  Typical number of drinks in a day: 0  Typical number of drinks in a week: 0  Interpretation: Low risk drinking behavior.    Single Item Drug Screening:  How often have you used an illegal drug (including marijuana) or a prescription medication for non-medical reasons in the past year? never    Single Item Drug Screen Score: 0  Interpretation: Negative screen for possible drug use disorder    Social Drivers of Health     Food Insecurity: Patient Declined (3/13/2024)    Nursing - Inadequate Food Risk Classification     Worried About Running Out of Food in the Last Year: Patient declined     Ran Out of Food in the Last Year: Patient declined   Transportation Needs: No Transportation Needs (7/2/2025)    PRAPARE - Transportation     Lack of Transportation (Medical): No     Lack of Transportation (Non-Medical): No   Housing Stability: Unknown (7/2/2025)    Housing Stability Vital Sign     Unable to Pay for Housing in the Last Year: No     Homeless in the Last Year: No   Utilities: Not At Risk (7/2/2025)    [facility] Utilities     Threatened with loss of utilities: No     No results found.    Objective   Temp (!) 96.8 °F (36 °C)   Ht 5' 3\" (1.6 m)   Wt 87.5 kg (193 lb)   BMI 34.19 kg/m²     Physical Exam  Vitals and nursing note reviewed.   Constitutional:       General: She is not in acute distress.     Appearance: She is well-developed.   HENT:      Head: Normocephalic and " atraumatic.      Right Ear: Tympanic membrane normal. There is no impacted cerumen. Tympanic membrane is not erythematous.      Left Ear: There is impacted cerumen. Tympanic membrane is erythematous.      Nose: Nose normal.      Mouth/Throat:      Pharynx: Oropharynx is clear. No posterior oropharyngeal erythema.     Eyes:      Conjunctiva/sclera: Conjunctivae normal.      Pupils: Pupils are equal, round, and reactive to light.       Cardiovascular:      Rate and Rhythm: Normal rate and regular rhythm.      Heart sounds: No murmur heard.  Pulmonary:      Effort: Pulmonary effort is normal. No respiratory distress.      Breath sounds: Normal breath sounds. No wheezing.   Abdominal:      General: Abdomen is flat.     Musculoskeletal:         General: No swelling.      Cervical back: Neck supple.     Skin:     General: Skin is warm and dry.     Neurological:      Mental Status: She is alert and oriented to person, place, and time.     Psychiatric:         Mood and Affect: Mood normal.